# Patient Record
Sex: FEMALE | Race: WHITE | Employment: OTHER | URBAN - METROPOLITAN AREA
[De-identification: names, ages, dates, MRNs, and addresses within clinical notes are randomized per-mention and may not be internally consistent; named-entity substitution may affect disease eponyms.]

---

## 2017-01-06 ENCOUNTER — APPOINTMENT (OUTPATIENT)
Dept: CT IMAGING | Facility: CLINIC | Age: 67
DRG: 907 | End: 2017-01-06
Attending: INTERNAL MEDICINE
Payer: MEDICARE

## 2017-01-06 ENCOUNTER — ANESTHESIA (OUTPATIENT)
Dept: SURGERY | Facility: CLINIC | Age: 67
DRG: 907 | End: 2017-01-06
Payer: MEDICARE

## 2017-01-06 ENCOUNTER — ANESTHESIA EVENT (OUTPATIENT)
Dept: SURGERY | Facility: CLINIC | Age: 67
DRG: 907 | End: 2017-01-06
Payer: MEDICARE

## 2017-01-06 PROBLEM — K63.1 PERFORATED SIGMOID COLON (H): Status: ACTIVE | Noted: 2017-01-06

## 2017-01-06 PROCEDURE — 25000125 ZZHC RX 250: Performed by: ANESTHESIOLOGY

## 2017-01-06 PROCEDURE — 25000125 ZZHC RX 250: Performed by: NURSE ANESTHETIST, CERTIFIED REGISTERED

## 2017-01-06 PROCEDURE — 25800025 ZZH RX 258: Performed by: ANESTHESIOLOGY

## 2017-01-06 PROCEDURE — 74177 CT ABD & PELVIS W/CONTRAST: CPT

## 2017-01-06 RX ORDER — FENTANYL CITRATE 50 UG/ML
INJECTION, SOLUTION INTRAMUSCULAR; INTRAVENOUS PRN
Status: DISCONTINUED | OUTPATIENT
Start: 2017-01-06 | End: 2017-01-06

## 2017-01-06 RX ORDER — GLYCOPYRROLATE 0.2 MG/ML
INJECTION, SOLUTION INTRAMUSCULAR; INTRAVENOUS PRN
Status: DISCONTINUED | OUTPATIENT
Start: 2017-01-06 | End: 2017-01-06

## 2017-01-06 RX ORDER — PROPOFOL 10 MG/ML
INJECTION, EMULSION INTRAVENOUS PRN
Status: DISCONTINUED | OUTPATIENT
Start: 2017-01-06 | End: 2017-01-06

## 2017-01-06 RX ORDER — DEXAMETHASONE SODIUM PHOSPHATE 4 MG/ML
INJECTION, SOLUTION INTRA-ARTICULAR; INTRALESIONAL; INTRAMUSCULAR; INTRAVENOUS; SOFT TISSUE PRN
Status: DISCONTINUED | OUTPATIENT
Start: 2017-01-06 | End: 2017-01-06

## 2017-01-06 RX ORDER — NEOSTIGMINE METHYLSULFATE 1 MG/ML
VIAL (ML) INJECTION PRN
Status: DISCONTINUED | OUTPATIENT
Start: 2017-01-06 | End: 2017-01-06

## 2017-01-06 RX ORDER — PROPOFOL 10 MG/ML
INJECTION, EMULSION INTRAVENOUS CONTINUOUS PRN
Status: DISCONTINUED | OUTPATIENT
Start: 2017-01-06 | End: 2017-01-06

## 2017-01-06 RX ORDER — ONDANSETRON 2 MG/ML
INJECTION INTRAMUSCULAR; INTRAVENOUS PRN
Status: DISCONTINUED | OUTPATIENT
Start: 2017-01-06 | End: 2017-01-06

## 2017-01-06 RX ADMIN — SODIUM CHLORIDE, POTASSIUM CHLORIDE, SODIUM LACTATE AND CALCIUM CHLORIDE: 600; 310; 30; 20 INJECTION, SOLUTION INTRAVENOUS at 15:25

## 2017-01-06 RX ADMIN — GLYCOPYRROLATE 0.1 MG: 0.2 INJECTION, SOLUTION INTRAMUSCULAR; INTRAVENOUS at 13:25

## 2017-01-06 RX ADMIN — ROCURONIUM BROMIDE 10 MG: 10 INJECTION INTRAVENOUS at 16:01

## 2017-01-06 RX ADMIN — SODIUM CHLORIDE, POTASSIUM CHLORIDE, SODIUM LACTATE AND CALCIUM CHLORIDE: 600; 310; 30; 20 INJECTION, SOLUTION INTRAVENOUS at 13:20

## 2017-01-06 RX ADMIN — PHENYLEPHRINE HYDROCHLORIDE 100 MCG: 10 INJECTION, SOLUTION INTRAMUSCULAR; INTRAVENOUS; SUBCUTANEOUS at 16:38

## 2017-01-06 RX ADMIN — FENTANYL CITRATE 100 MCG: 50 INJECTION, SOLUTION INTRAMUSCULAR; INTRAVENOUS at 13:55

## 2017-01-06 RX ADMIN — MIDAZOLAM HYDROCHLORIDE 2 MG: 1 INJECTION, SOLUTION INTRAMUSCULAR; INTRAVENOUS at 13:20

## 2017-01-06 RX ADMIN — Medication 30 MG: at 13:25

## 2017-01-06 RX ADMIN — FENTANYL CITRATE 100 MCG: 50 INJECTION, SOLUTION INTRAMUSCULAR; INTRAVENOUS at 13:25

## 2017-01-06 RX ADMIN — ROCURONIUM BROMIDE 50 MG: 10 INJECTION INTRAVENOUS at 13:25

## 2017-01-06 RX ADMIN — PROPOFOL 50 MCG/KG/MIN: 10 INJECTION, EMULSION INTRAVENOUS at 13:38

## 2017-01-06 RX ADMIN — ONDANSETRON 4 MG: 2 INJECTION INTRAMUSCULAR; INTRAVENOUS at 16:00

## 2017-01-06 RX ADMIN — PHENYLEPHRINE HYDROCHLORIDE 100 MCG: 10 INJECTION, SOLUTION INTRAMUSCULAR; INTRAVENOUS; SUBCUTANEOUS at 13:34

## 2017-01-06 RX ADMIN — HYDROMORPHONE HYDROCHLORIDE 0.5 MG: 1 INJECTION, SOLUTION INTRAMUSCULAR; INTRAVENOUS; SUBCUTANEOUS at 16:02

## 2017-01-06 RX ADMIN — FENTANYL CITRATE 50 MCG: 50 INJECTION, SOLUTION INTRAMUSCULAR; INTRAVENOUS at 14:01

## 2017-01-06 RX ADMIN — SODIUM CHLORIDE, POTASSIUM CHLORIDE, SODIUM LACTATE AND CALCIUM CHLORIDE: 600; 310; 30; 20 INJECTION, SOLUTION INTRAVENOUS at 14:20

## 2017-01-06 RX ADMIN — GLYCOPYRROLATE 0.3 MG: 0.2 INJECTION, SOLUTION INTRAMUSCULAR; INTRAVENOUS at 16:30

## 2017-01-06 RX ADMIN — ROCURONIUM BROMIDE 15 MG: 10 INJECTION INTRAVENOUS at 15:21

## 2017-01-06 RX ADMIN — PROPOFOL 160 MG: 10 INJECTION, EMULSION INTRAVENOUS at 13:25

## 2017-01-06 RX ADMIN — HYDROMORPHONE HYDROCHLORIDE 0.5 MG: 1 INJECTION, SOLUTION INTRAMUSCULAR; INTRAVENOUS; SUBCUTANEOUS at 14:49

## 2017-01-06 RX ADMIN — HYDROMORPHONE HYDROCHLORIDE 0.5 MG: 1 INJECTION, SOLUTION INTRAMUSCULAR; INTRAVENOUS; SUBCUTANEOUS at 14:12

## 2017-01-06 RX ADMIN — ROCURONIUM BROMIDE 15 MG: 10 INJECTION INTRAVENOUS at 14:49

## 2017-01-06 RX ADMIN — DEXAMETHASONE SODIUM PHOSPHATE 4 MG: 4 INJECTION, SOLUTION INTRAMUSCULAR; INTRAVENOUS at 13:25

## 2017-01-06 RX ADMIN — Medication 3 MG: at 16:30

## 2017-01-06 NOTE — ANESTHESIA CARE TRANSFER NOTE
Patient: Aaliyah Hayes    LAPAROSCOPIC ASSISTED SIGMOID COLECTOMY (N/A Abdomen)  Additional InformationProcedure(s):  LAPAROSCOPIC ASSISTED SIGMOID COLECTOMY - Wound Class: II-Clean Contaminated    Diagnosis: perfed bowel  Diagnosis Additional Information: No value filed.    Anesthesia Type:   General, RSI, ETT     Note:  Airway :Face Mask  Patient transferred to:PACU  Comments: To PACU, adequate gas exchange, report to RN.      Vitals: (Last set prior to Anesthesia Care Transfer)              Electronically Signed By: PANKAJ Birmingham CRNA  January 6, 2017  4:55 PM

## 2017-01-06 NOTE — ANESTHESIA POSTPROCEDURE EVALUATION
Patient: Aaliyah Hayes    LAPAROSCOPIC ASSISTED SIGMOID COLECTOMY (N/A Abdomen)  Additional InformationProcedure(s):  LAPAROSCOPIC ASSISTED SIGMOID COLECTOMY - Wound Class: II-Clean Contaminated    Diagnosis:perfed bowel  Diagnosis Additional Information: Pre-operative diagnosis:  perfed bowel   Post-operative diagnosis  Perforated distal sigmoid colon   Procedure:  Procedure(s):  LAPAROSCOPIC ASSISTED SIGMOID COLECTOMY - Wound Class: II-Clean Contaminated             Anesthesia Type:  General, RSI, ETT    Note:  Anesthesia Post Evaluation    Patient location during evaluation: PACU  Patient participation: Able to fully participate in evaluation  Level of consciousness: awake  Pain management: adequate  Airway patency: patent  Cardiovascular status: acceptable  Respiratory status: acceptable  Hydration status: euvolemic  PONV: controlled     Anesthetic complications: None          Last vitals:  Filed Vitals:    01/06/17 1700 01/06/17 1705 01/06/17 1710   BP: 98/66 103/57 97/57   Pulse:      Temp:      Resp: 14 27 15   SpO2: 95% 95% 98%       Electronically Signed By: Felipe Santoro MD  January 6, 2017  5:15 PM

## 2017-01-06 NOTE — ANESTHESIA PREPROCEDURE EVALUATION
Anesthesia Evaluation     . Pt has had prior anesthetic. Type: General    No history of anesthetic complications     ROS/MED HX    ENT/Pulmonary:  - neg pulmonary ROS     Neurologic:  - neg neurologic ROS     Cardiovascular:     (+) hypertension----. : . . . :. .       METS/Exercise Tolerance:     Hematologic: Comments: Lab Test        11/11/16     02/05/16     11/24/15                       1039          1143          1110          WBC          10.3         7.0          14.0*         HGB          13.5         13.4         13.4          MCV          99           99           97            PLT          255          210          225            Lab Test        11/11/16 03/04/16 02/05/16                       1039          1324          1143          NA           136          138          136           POTASSIUM    4.2          4.1          3.5           CHLORIDE     99           101          100           CO2          27           30           26            BUN          21           15           18            CR           1.02         0.72         0.92          ANIONGAP     10           7            10            DARIUS          9.2          9.5          9.0           GLC          89           82           110*                    Musculoskeletal:  - neg musculoskeletal ROS       GI/Hepatic:     (+) Other GI/Hepatic ruptured sigmoid during endoscopy      Renal/Genitourinary:  - ROS Renal section negative       Endo:  - neg endo ROS       Psychiatric:  - neg psychiatric ROS       Infectious Disease:  - neg infectious disease ROS       Malignancy:         Other:    - neg other ROS           Physical Exam  Normal systems: cardiovascular, pulmonary and dental    Airway   Mallampati: II  TM distance: >3 FB  Neck ROM: full    Dental     Cardiovascular       Pulmonary                     Anesthesia Plan      History & Physical Review  History and physical reviewed and following examination; no interval change.    ASA  Status:  2 emergent.    NPO Status:  Waived due to emergency    Plan for General, RSI and ETT with Intravenous induction. Maintenance will be Balanced.    PONV prophylaxis:  Ondansetron (or other 5HT-3) and Dexamethasone or Solumedrol       Postoperative Care  Postoperative pain management:  IV analgesics and Oral pain medications.      Consents  Anesthetic plan, risks, benefits and alternatives discussed with:  Patient..                          .

## 2017-01-16 ENCOUNTER — TELEPHONE (OUTPATIENT)
Dept: PEDIATRICS | Facility: CLINIC | Age: 67
End: 2017-01-16

## 2017-01-16 NOTE — TELEPHONE ENCOUNTER
Patient was hospitalized for a perforated colon due to colonoscopy.  Patient is concerned she may have a bladder infection.  Has noted low-grade fever, and urinary discomfort.  Denies temp >101  Denies flank pain or abdominal pain.  Denies nausea or vomiting.  Advised that appointment tomorrow is fine.  If patient develops flank pain, fever >101, nausea or vomiting, will call back for an appointment today or will be seen in Urgent care.  No further questions.  Will call back if any other questions or concerns..  ZACHARY Alvares RN

## 2017-01-17 ENCOUNTER — OFFICE VISIT (OUTPATIENT)
Dept: PEDIATRICS | Facility: CLINIC | Age: 67
End: 2017-01-17
Payer: MEDICARE

## 2017-01-17 VITALS
HEIGHT: 62 IN | SYSTOLIC BLOOD PRESSURE: 98 MMHG | TEMPERATURE: 98.8 F | DIASTOLIC BLOOD PRESSURE: 52 MMHG | HEART RATE: 66 BPM | BODY MASS INDEX: 25.07 KG/M2 | OXYGEN SATURATION: 99 % | WEIGHT: 136.25 LBS

## 2017-01-17 DIAGNOSIS — M25.552 HIP PAIN, LEFT: ICD-10-CM

## 2017-01-17 DIAGNOSIS — R31.9 HEMATURIA: ICD-10-CM

## 2017-01-17 DIAGNOSIS — K63.1 PERFORATED SIGMOID COLON (H): ICD-10-CM

## 2017-01-17 DIAGNOSIS — N30.01 ACUTE CYSTITIS WITH HEMATURIA: Primary | ICD-10-CM

## 2017-01-17 DIAGNOSIS — R30.0 DYSURIA: ICD-10-CM

## 2017-01-17 LAB
ALBUMIN UR-MCNC: NEGATIVE MG/DL
APPEARANCE UR: ABNORMAL
BACTERIA #/AREA URNS HPF: ABNORMAL /HPF
BILIRUB UR QL STRIP: NEGATIVE
COLOR UR AUTO: YELLOW
GLUCOSE UR STRIP-MCNC: NEGATIVE MG/DL
HGB UR QL STRIP: ABNORMAL
KETONES UR STRIP-MCNC: NEGATIVE MG/DL
LEUKOCYTE ESTERASE UR QL STRIP: ABNORMAL
NITRATE UR QL: NEGATIVE
NON-SQ EPI CELLS #/AREA URNS LPF: ABNORMAL /LPF
PH UR STRIP: 5.5 PH (ref 5–7)
RBC #/AREA URNS AUTO: ABNORMAL /HPF (ref 0–2)
SP GR UR STRIP: 1.01 (ref 1–1.03)
URN SPEC COLLECT METH UR: ABNORMAL
UROBILINOGEN UR STRIP-ACNC: 0.2 EU/DL (ref 0.2–1)
WBC #/AREA URNS AUTO: ABNORMAL /HPF (ref 0–2)

## 2017-01-17 PROCEDURE — 81001 URINALYSIS AUTO W/SCOPE: CPT | Performed by: INTERNAL MEDICINE

## 2017-01-17 PROCEDURE — 87086 URINE CULTURE/COLONY COUNT: CPT | Performed by: INTERNAL MEDICINE

## 2017-01-17 PROCEDURE — 99214 OFFICE O/P EST MOD 30 MIN: CPT | Performed by: INTERNAL MEDICINE

## 2017-01-17 RX ORDER — CIPROFLOXACIN 500 MG/1
500 TABLET, FILM COATED ORAL 2 TIMES DAILY
Qty: 14 TABLET | Refills: 0 | Status: SHIPPED | OUTPATIENT
Start: 2017-01-17 | End: 2017-06-09

## 2017-01-17 RX ORDER — TRAMADOL HYDROCHLORIDE 50 MG/1
50 TABLET ORAL EVERY 8 HOURS PRN
Qty: 28 TABLET | Refills: 5 | Status: SHIPPED | OUTPATIENT
Start: 2017-01-17 | End: 2017-06-09

## 2017-01-17 NOTE — MR AVS SNAPSHOT
After Visit Summary   1/17/2017    Aaliyah Hayes    MRN: [id number]           Patient Information     Date Of Birth          1950        Visit Information        Provider Department      1/17/2017 10:20 AM Jessie High MD [facility] [place]        Today's Diagnoses     Acute cystitis with hematuria    -  1     Dysuria         Perforated sigmoid colon (H)         Hip pain, left         Hematuria            Follow-ups after your visit        Future tests that were ordered for you today     Open Future Orders        Priority Expected Expires Ordered    *UA reflex to Microscopic and Culture ([facility], [place] and [facility] (except [place] and [place]) Routine  2/17/2017 1/17/2017            Who to contact     If you have questions or need follow up information about today's clinic visit or your schedule please contact [facility] directly at [phone].  Normal or non-critical lab and imaging results will be communicated to you by [employer], letter or phone within 4 business days after the clinic has received the results. If you do not hear from us within 7 days, please contact the clinic through [employer] or phone. If you have a critical or abnormal lab result, we will notify you by phone as soon as possible.  Submit refill requests through [employer] or call your pharmacy and they will forward the refill request to us. Please allow 3 business days for your refill to be completed.          Additional Information About Your Visit        MyChart Information     [employer] gives you secure access to your electronic health record. If you see a primary care provider, you can also send messages to your care team and make appointments. If you have questions, please call your primary care clinic.  If you do not have a primary care provider, please call [phone] and they will assist you.        Care EveryWhere ID     This is your Care EveryWhere ID. This could be used by  "other organizations to access your [facility] medical records  [id number]        Your Vitals Were     Pulse Temperature Height BMI (Body Mass Index) Pulse Oximetry       66 98.8  F (37.1  C) (Tympanic) 5' 2\" (1.575 m) 24.91 kg/m2 99%        Blood Pressure from Last 3 Encounters:   01/17/17 98/52   01/09/17 120/68   11/11/16 128/80    Weight from Last 3 Encounters:   01/17/17 136 lb 4 oz (61.803 kg)   01/06/17 134 lb (60.782 kg)   11/11/16 138 lb 8 oz (62.823 kg)              We Performed the Following     *UA reflex to Microscopic and Culture ([facility] (except [place] and [place])     Urine Culture Aerobic Bacterial     Urine Microscopic          Today's Medication Changes          These changes are accurate as of: 1/17/17 10:57 AM.  If you have any questions, ask your nurse or doctor.               Start taking these medicines.        Dose/Directions    ciprofloxacin 500 MG tablet   Commonly known as:  CIPRO   Used for:  Acute cystitis with hematuria   Started by:  Jessie High MD        Dose:  500 mg   Take 1 tablet (500 mg) by mouth 2 times daily   Quantity:  14 tablet   Refills:  0            Where to get your medicines      These medications were sent to [facility] [address]  [address] [address]     Phone:  [phone]    - ciprofloxacin 500 MG tablet      Some of these will need a paper prescription and others can be bought over the counter.  Ask your nurse if you have questions.     Bring a paper prescription for each of these medications    - traMADol 50 MG tablet             Primary Care Provider Office Phone # Fax #    Jessie High -[phone]       [facility] [address] [place] [address]        Thank you!     Thank you for choosing [facility]  for your care. Our goal is always to provide you with excellent care. Hearing back from our patients is " one way we can continue to improve our services. Please take a few minutes to complete the written survey that you may receive in the mail after your visit with us. Thank you!             Your Updated Medication List - Protect others around you: Learn how to safely use, store and throw away your medicines at www.disposemymeds.org.          This list is accurate as of: 1/17/17 10:57 AM.  Always use your most recent med list.                   Brand Name Dispense Instructions for use    aspirin 81 MG tablet      Take 81 mg by mouth daily       atenolol-chlorthalidone 100-25 MG per tablet    TENORETIC 100    90 tablet    Take 1 tablet by mouth daily       ciprofloxacin 500 MG tablet    CIPRO    14 tablet    Take 1 tablet (500 mg) by mouth 2 times daily       ibuprofen 200 MG tablet    ADVIL/MOTRIN    120 tablet    Take 3 tablets (600 mg) by mouth every 6 hours as needed for pain       lisinopril 10 MG tablet    PRINIVIL/ZESTRIL    90 tablet    Take 1 tablet (10 mg) by mouth daily       magnesium 250 MG tablet     30 tablet    Take 2 tablets by mouth At Bedtime       Multi-vitamin Tabs tablet     100 tablet    Take 1 tablet by mouth every morning       Potassium Gluconate 2 MEQ Tabs     120 tablet    Take 2 tablets by mouth every morning       simvastatin 20 MG tablet    ZOCOR     Take 20 mg by mouth At Bedtime       traMADol 50 MG tablet    ULTRAM    28 tablet    Take 1 tablet (50 mg) by mouth every 8 hours as needed       TYLENOL 500 MG tablet   Generic drug:  acetaminophen      Take 1-2 tablets (500-1,000 mg) by mouth every 6 hours as needed for mild pain       vitamin B complex with vitamin C Tabs tablet      Take 1 tablet by mouth daily

## 2017-01-17 NOTE — NURSING NOTE
"Chief Complaint   Patient presents with     UTI     Surgical Followup       Initial BP 98/52 mmHg  Pulse 66  Temp(Src) 98.8  F (37.1  C) (Tympanic)  Ht 5' 2\" (1.575 m)  Wt 136 lb 4 oz (61.803 kg)  BMI 24.91 kg/m2  SpO2 99% Estimated body mass index is 24.91 kg/(m^2) as calculated from the following:    Height as of this encounter: 5' 2\" (1.575 m).    Weight as of this encounter: 136 lb 4 oz (61.803 kg).  BP completed using cuff size: regular  Francinedamien Kirk CMA      "

## 2017-01-17 NOTE — PROGRESS NOTES
"  SUBJECTIVE:                                                    Aaliyah Hayes is a 66 year old female who presents to clinic today for the following health issues:      URINARY TRACT SYMPTOMS      Duration: after surgery pt feels like she is still having infection feeling    Description  dysuria, frequency and dark stools    Intensity:  mild, moderate    Accompanying signs and symptoms:  Fever/chills: YES  Flank pain YES  Nausea and vomiting: YES, nausea  Vaginal symptoms: none  Abdominal/Pelvic Pain: YES    History  History of frequent UTI's: no   History of kidney stones: no   Sexually Active: no   Possibility of pregnancy: No    Precipitating or alleviating factors: None    Therapies tried and outcome: ibuprofen and Tylenol   Outcome: some relief     Notes fatigue, body/muscle aches.  She feels that she is not getting better like she thinks she should.  Notes that abdomen is \"tumbly\".  No fevers.  BM's are normal, no blood in stools.  Had to cancel hip replacement due to bowel perforation and sigmoid resection.  Wondering about getting a script for more tramadol.      Has some burning with urination and after urination.  Having hip pain, and maybe some low back pain.        Problem list and histories reviewed & adjusted, as indicated.  Additional history: as documented    Problem list, Medication list, Allergies, and Medical/Social/Surgical histories reviewed in [facility] and updated as appropriate.    ROS:  Constitutional, HEENT, cardiovascular, pulmonary, gi and gu systems are negative, except as otherwise noted.    OBJECTIVE:                                                    BP 98/52 mmHg  Pulse 66  Temp(Src) 98.8  F (37.1  C) (Tympanic)  Ht 5' 2\" (1.575 m)  Wt 136 lb 4 oz (61.803 kg)  BMI 24.91 kg/m2  SpO2 99%  Body mass index is 24.91 kg/(m^2).  GENERAL: healthy, alert and no distress  RESP: lungs clear to auscultation - no rales, rhonchi or wheezes  CV: regular rate and rhythm, normal S1 S2, no S3 " or S4, no murmur, click or rub, no peripheral edema and peripheral pulses strong  ABDOMEN: soft, no hepatosplenomegaly, no masses and bowel sounds normal.  Incisions c/d/i, no erythema.  Expected tenderness around incisions.   MS: no gross musculoskeletal defects noted, no edema  Back:  No CVA tenderness    Diagnostic Test Results:  Results for orders placed or performed in visit on 01/17/17 (from the past 24 hour(s))   *UA reflex to Microscopic and Culture ([facility] (except [place] and [place])   Result Value Ref Range    Color Urine Yellow     Appearance Urine Slightly Cloudy     Glucose Urine Negative NEG mg/dL    Bilirubin Urine Negative NEG    Ketones Urine Negative NEG mg/dL    Specific Gravity Urine 1.015 1.003 - 1.035    Blood Urine Large (A) NEG    pH Urine 5.5 5.0 - 7.0 pH    Protein Albumin Urine Negative NEG mg/dL    Urobilinogen Urine 0.2 0.2 - 1.0 EU/dL    Nitrite Urine Negative NEG    Leukocyte Esterase Urine Large (A) NEG    Source Midstream Urine    Urine Microscopic   Result Value Ref Range    WBC Urine 10-25 (A) 0 - 2 /HPF    RBC Urine  (A) 0 - 2 /HPF    Squamous Epithelial /LPF Urine Few FEW /LPF    Bacteria Urine Few (A) NEG /HPF        ASSESSMENT/PLAN:                                                    (N30.01) Acute cystitis with hematuria  (primary encounter diagnosis)  -will treat for 7 days due to having a catheter while hospitalized  -call if fevers, back pain, no improvement in 48 hrs  -recheck urine in 2 weeks to be sure hematuria has resolved  Plan: ciprofloxacin (CIPRO) 500 MG tablet, *UA reflex        to Microscopic and Culture ([facility] (except [place] and         [place])         (K63.1) Perforated sigmoid colon (H)  -doing well post op  -mobility is good, normal bm's  -call if blood in stools, black stools or any new symptoms    (M25.552) Hip pain, left  -ok to use tramadol for pain   Plan: traMADol  (ULTRAM) 50 MG tablet          (R31.9) Hematuria  -recheck urine in 2 weeks to be sure hematuria has resolved    (R30.0) Dysuria  Plan: *UA reflex [facility] Microscopic and Culture         [facility]except         [place] and [place]), Urine Microscopic,         Urine Culture Aerobic Bacterial         FUTURE APPOINTMENTS:       - Follow-up visit in 2 weeks for ua only        - Follow-up for annual visit or as needed    Jessie High MD  [facility]

## 2017-01-18 ENCOUNTER — TRANSFERRED RECORDS (OUTPATIENT)
Dept: HEALTH INFORMATION MANAGEMENT | Facility: CLINIC | Age: 67
End: 2017-01-18

## 2017-01-18 LAB
BACTERIA SPEC CULT: NORMAL
MICRO REPORT STATUS: NORMAL
SPECIMEN SOURCE: NORMAL

## 2017-01-20 ENCOUNTER — TELEPHONE (OUTPATIENT)
Dept: PEDIATRICS | Facility: CLINIC | Age: 67
End: 2017-01-20

## 2017-01-20 NOTE — TELEPHONE ENCOUNTER
Pt calls and leaves a message on the triage line.  She was given cipro.  She thinks she may be having a reaction and is requesting a call back.  Called the pt back.      She has facial swelling, tingling on her whole upper lip.  Her throat is slightly closed.  She had a little bit of a racing heart.  She said it is not life threatening.  She has body and muscle aches.  The last dose was last night at 8 p.m.  She generally does not feel good with it.  She did take some benadryl, 1 yesterday afternoon and 1 this morning about 2:30.  She said it did not seem to help.      Advised not to take cipro again.      Her face is still swollen, her throat still feels a little closed, mucous congestion, her nasal passages are a little swollen.      She is not really having any bladder symptoms.  Within 2 pills she felt much better with the bladder.      She is going to go to her [facility] house.      Pt can be reached at [phone].    Spoke to Dr. High about above.  She advised to stop the cipro.  Put the cipro on allergy list.  If worse, go to the ER.  She can take benadryl per directions also.  Called the pt to advise.

## 2017-01-23 ENCOUNTER — TRANSFERRED RECORDS (OUTPATIENT)
Dept: HEALTH INFORMATION MANAGEMENT | Facility: CLINIC | Age: 67
End: 2017-01-23

## 2017-02-10 DIAGNOSIS — N30.01 ACUTE CYSTITIS WITH HEMATURIA: ICD-10-CM

## 2017-02-10 LAB
ALBUMIN UR-MCNC: ABNORMAL MG/DL
APPEARANCE UR: CLEAR
BILIRUB UR QL STRIP: NEGATIVE
COLOR UR AUTO: YELLOW
GLUCOSE UR STRIP-MCNC: NEGATIVE MG/DL
HGB UR QL STRIP: NEGATIVE
KETONES UR STRIP-MCNC: NEGATIVE MG/DL
LEUKOCYTE ESTERASE UR QL STRIP: NEGATIVE
NITRATE UR QL: NEGATIVE
PH UR STRIP: 6.5 PH (ref 5–7)
RBC #/AREA URNS AUTO: NORMAL /HPF (ref 0–2)
SP GR UR STRIP: 1.02 (ref 1–1.03)
URN SPEC COLLECT METH UR: ABNORMAL
UROBILINOGEN UR STRIP-ACNC: 0.2 EU/DL (ref 0.2–1)
WBC #/AREA URNS AUTO: NORMAL /HPF (ref 0–2)

## 2017-02-10 PROCEDURE — 81001 URINALYSIS AUTO W/SCOPE: CPT | Performed by: INTERNAL MEDICINE

## 2017-03-02 DIAGNOSIS — I10 BENIGN ESSENTIAL HYPERTENSION: ICD-10-CM

## 2017-03-02 RX ORDER — LISINOPRIL 10 MG/1
10 TABLET ORAL DAILY
Qty: 90 TABLET | Refills: 3 | Status: SHIPPED | OUTPATIENT
Start: 2017-03-02 | End: 2017-09-25

## 2017-03-02 NOTE — TELEPHONE ENCOUNTER
LISINOPRIL 10MG      Last Written Prescription Date: 3/4/2016  Last Fill Quantity: 90, # refills: 3  Last Office Visit with G, [facility] or [facility] prescribing provider: 1/17/2017       Potassium   Date Value Ref Range Status   01/07/2017 3.4 3.4 - 5.3 mmol/L Final     Creatinine   Date Value Ref Range Status   01/07/2017 0.72 0.52 - 1.04 mg/dL Final     BP Readings from Last 3 Encounters:   01/17/17 98/52   01/09/17 120/68   11/11/16 128/80

## 2017-06-06 ASSESSMENT — PATIENT HEALTH QUESTIONNAIRE - PHQ9
SUM OF ALL RESPONSES TO PHQ QUESTIONS 1-9: 12
SUM OF ALL RESPONSES TO PHQ QUESTIONS 1-9: 12
10. IF YOU CHECKED OFF ANY PROBLEMS, HOW DIFFICULT HAVE THESE PROBLEMS MADE IT FOR YOU TO DO YOUR WORK, TAKE CARE OF THINGS AT HOME, OR GET ALONG WITH OTHER PEOPLE: SOMEWHAT DIFFICULT

## 2017-06-07 ASSESSMENT — PATIENT HEALTH QUESTIONNAIRE - PHQ9: SUM OF ALL RESPONSES TO PHQ QUESTIONS 1-9: 12

## 2017-06-07 NOTE — PROGRESS NOTES
SUBJECTIVE:                                                            Aaliyah Hayes is a 67 year old female who presents for Preventive Visit.  Are you in the first 12 months of your Medicare coverage?  Yes,  Visual Acuity:  Right Eye: 10/12   Left Eye: 10/12  Both Eyes: 10/12    Physical   Annual:     Getting at least 3 servings of Calcium per day::  Yes    Bi-annual eye exam::  NO    Dental care twice a year::  Yes    Sleep apnea or symptoms of sleep apnea::  Daytime drowsiness    Diet::  Regular (no restrictions) and Other    Frequency of exercise::  None    Taking medications regularly::  Yes    Additional concerns today::  YES (discuss chronic pain management, sleep apnea, need referral forx ray guided cortisone injection)      1) was referred in march for steroid injection, would like to do another.    2) worried about ibuprofen, wondering if celebrex would be helpful.  Is taking tramadol at night for pain.  Having hip replacement in December.      3) she wonders if she has sleep apnea.  Is drowsy during the day.  Has had some nightmares.  She does snore.  Goes to bed at 9:30 pm- 5 am.  Doesn't sleep well due to pain.  Does not wake feeling rested.  Sister has sleep apnea.  No hypersomnolence during the day.    4) has herpetic ritesh on finger that comes and goes a few times a year.  Uses abbreva      COGNITIVE SCREEN  1) Repeat 3 items (Banana, Sunrise, Chair)    2) Clock draw: NORMAL  3) 3 item recall: Recalls 3 objects  Results: 3 items recalled: COGNITIVE IMPAIRMENT LESS LIKELY    Mini-CogTM Copyright SHARLA Lizarraga. Licensed by the author for use in [facility]; reprinted with permission (buck@.[facility]). All rights reserved.        Reviewed and updated as needed this visit by clinical staff  Tobacco  Allergies  Meds  Med Hx  Surg Hx  Fam Hx  Soc Hx        Reviewed and updated as needed this visit by Provider        Social History   Substance Use Topics     Smoking status: Never Smoker      Smokeless tobacco: Never Used     Alcohol use 0.0 oz/week     0 Standard drinks or equivalent per week      Comment: 1 glass of wine per night       The patient does not drink >3 drinks per day nor >7 drinks per week.        Hyperlipidemia Follow-Up      Rate your low fat/cholesterol diet?: good    Taking statin?  Yes, no muscle aches from statin    Other lipid medications/supplements?:  none     Hypertension Follow-up      Outpatient blood pressures are being checked at home.  Results are 120/70s.    Low Salt Diet: no added salt       Today's PHQ-2 Score:   PHQ-2 ( 1999 Pfizer) 6/6/2017   Q1: Little interest or pleasure in doing things Nearly every day   Q2: Feeling down, depressed or hopeless Not at all   PHQ-2 Score 3       Do you feel safe in your environment - YES    Do you have a Health Care Directive?: No: Advance care planning was reviewed with patient; patient declined at this time.    Current providers sharing in care for this patient include:   Patient Care Team:  Jessie High MD as PCP - General (Pediatrics)      Hearing impairment: Yes, Feel that people are mumbling or not speaking clearly.    Need to ask people to speak up or repeat themselves.    Ability to successfully perform activities of daily living: Yes, no assistance needed     Fall risk:       Home safety:  none identified      The following health maintenance items are reviewed in Epic and correct as of today:  Health Maintenance   Topic Date Due     HEPATITIS C SCREENING  01/28/1970     DEXA Q3 YR  01/28/1982     FALL RISK ASSESSMENT  01/28/2017     PNEUMOCOCCAL (1 of 2 - PCV13) 01/28/2017     PAP Q3 YR  02/14/2017     LIPID MONITORING Q1 YEAR  03/04/2017     INFLUENZA VACCINE (SYSTEM ASSIGNED)  09/01/2017     CMP Q1 YR  11/11/2017     KRISTAN QUESTIONNAIRE 1 YEAR  11/11/2017     MAMMO SCREEN Q2 YR (SYSTEM ASSIGNED)  03/04/2018     PHQ-9 Q1YR  06/09/2018     ADVANCE DIRECTIVE PLANNING Q5 YRS  02/14/2019     TETANUS IMMUNIZATION  "(SYSTEM ASSIGNED)  06/14/2023     COLON CANCER SCREEN (SYSTEM ASSIGNED)  01/06/2027         Pneumonia Vaccine:Adults age 65+ who have not received previous Pneumovax (PPSV23) or PCV13 as an adult: Should first be given PCV13 AND then should be given PPSV23 6-12 months after PCV13  Mammogram Screening: Patient over age 50, mutual decision to screen reflected in health maintenance.     ROS:  Constitutional, HEENT, cardiovascular, pulmonary, gi and gu systems are negative, except as otherwise noted.    Problem list, Medication list, Allergies, and Medical/Social/Surgical histories reviewed in [facility] and updated as appropriate.  OBJECTIVE:                                                            /62 (BP Location: Right arm, Patient Position: Chair, Cuff Size: Adult Regular)  Pulse 69  Temp 98.8  F (37.1  C) (Tympanic)  Ht 5' 2\" (1.575 m)  Wt 132 lb 2 oz (59.9 kg)  SpO2 99%  BMI 24.17 kg/m2 Estimated body mass index is 24.17 kg/(m^2) as calculated from the following:    Height as of this encounter: 5' 2\" (1.575 m).    Weight as of this encounter: 132 lb 2 oz (59.9 kg).  EXAM:   GENERAL: healthy, alert and no distress  EYES: Eyes grossly normal to inspection, PERRL and conjunctivae and sclerae normal  HENT: ear canals and TM's normal, nose and mouth without ulcers or lesions  NECK: no adenopathy, no asymmetry, masses, or scars and thyroid normal to palpation  RESP: lungs clear to auscultation - no rales, rhonchi or wheezes  BREAST: normal without masses, tenderness or nipple discharge and no palpable axillary masses or adenopathy  CV: regular rate and rhythm, normal S1 S2, no S3 or S4, no murmur, click or rub, no peripheral edema and peripheral pulses strong  ABDOMEN: soft, nontender, no hepatosplenomegaly, no masses and bowel sounds normal   (female): normal female external genitalia, normal urethral meatus, vaginal mucosa pink, moist, well rugated, and normal cervix/adnexa/uterus without masses or " discharge  MS: no gross musculoskeletal defects noted, no edema  SKIN: no suspicious lesions or rashes  NEURO: Normal strength and tone, mentation intact and speech normal  PSYCH: mentation appears normal, affect normal/bright    ASSESSMENT / PLAN:                                                            (Z01.419) Encounter for gynecological examination without abnormal finding  (primary encounter diagnosis)  -pap today  -due for dexa  -prevnar today, otherwise utd  -colon utd  Plan: Pap imaged thin layer screen with HPV -         recommended age 30 - 65 years (select HPV order        below), HPV High Risk Types DNA Cervical           (I10) Benign essential hypertension  -bp at goal on meds  -due for labs   Plan: atenolol-chlorthalidone (TENORETIC 100) 100-25         MG per tablet, Comprehensive metabolic panel            (E78.5) Hyperlipidemia with target LDL less than 130  -due for labs, tolerating meds  Plan: Lipid Profile with reflex to direct LDL,         Comprehensive metabolic panel         (K21.9) Gastroesophageal reflux disease without esophagitis  Plan: omeprazole (PRILOSEC) 20 MG CR capsule            (M25.552) Hip pain, left  -will try switching from ibuprofen to celebrex to decrease the number of pills she needs to take  -also developing more reflux, GI started her on prilosec, celebrex likely better choice than ibuprofen   Plan: traMADol (ULTRAM) 50 MG tablet, celecoxib         (CELEBREX) 200 MG capsule          (B00.89) Herpetic ritesh  -discussed topical treatment vs pills  -pt would like to try pills, script sent, reviewed how to take meds  Plan: valACYclovir (VALTREX) 1000 mg tablet         (Z11.59) Need for hepatitis C screening test  Plan: **Hepatitis C Screen Reflex to RNA FUTURE         anytime          (Z13.820) Screening for osteoporosis    (Z23) Need for pneumococcal vaccination  Plan: Pneumococcal vaccine 13 valent PCV13 IM         (Prevnar) [60636], ADMIN: Vaccine, Initial          "(88927)           (Z78.0) Asymptomatic menopausal state   Plan: DX Hip/Pelvis/Spine        End of Life Planning:  Patient currently has an advanced directive: No.  I have verified the patient's ablity to prepare an advanced directive/make health care decisions.  Literature was provided to assist patient in preparing an advanced directive.    COUNSELING:  Reviewed preventive health counseling, as reflected in patient instructions       Regular exercise       Healthy diet/nutrition       Osteoporosis Prevention/Bone Health        Estimated body mass index is 24.17 kg/(m^2) as calculated from the following:    Height as of this encounter: 5' 2\" (1.575 m).    Weight as of this encounter: 132 lb 2 oz (59.9 kg).     reports that she has never smoked. She has never used smokeless tobacco.      Appropriate preventive services were discussed with this patient, including applicable screening as appropriate for cardiovascular disease, diabetes, osteopenia/osteoporosis, and glaucoma.  As appropriate for age/gender, discussed screening for colorectal cancer, prostate cancer, breast cancer, and cervical cancer. Checklist reviewing preventive services available has been given to the patient.    Reviewed patients plan of care and provided an AVS. The Intermediate Care Plan ( asthma action plan, low back pain action plan, and migraine action plan) for Aaliyah meets the Care Plan requirement. This Care Plan has been established and reviewed with the Patient.    Counseling Resources:  ATP IV Guidelines  Pooled Cohorts Equation Calculator  Breast Cancer Risk Calculator  FRAX Risk Assessment  ICSI Preventive Guidelines  Dietary Guidelines for Americans, 2010  USDA's MyPlate  ASA Prophylaxis  Lung CA Screening    Jessie High MD  [facility] [place]  Answers for HPI/ROS submitted by the patient on 6/6/2017   PHQ-2 Score: 3  If you checked off any problems, how difficult have these problems made it for you to do your work, " take care of things at home, or get along with other people?: Somewhat difficult  PHQ9 TOTAL SCORE: 12

## 2017-06-09 ENCOUNTER — OFFICE VISIT (OUTPATIENT)
Dept: PEDIATRICS | Facility: CLINIC | Age: 67
End: 2017-06-09
Payer: MEDICARE

## 2017-06-09 VITALS
HEART RATE: 69 BPM | BODY MASS INDEX: 24.31 KG/M2 | OXYGEN SATURATION: 99 % | DIASTOLIC BLOOD PRESSURE: 62 MMHG | SYSTOLIC BLOOD PRESSURE: 122 MMHG | HEIGHT: 62 IN | WEIGHT: 132.13 LBS | TEMPERATURE: 98.8 F

## 2017-06-09 DIAGNOSIS — B00.89 HERPETIC WHITLOW: ICD-10-CM

## 2017-06-09 DIAGNOSIS — E78.5 HYPERLIPIDEMIA WITH TARGET LDL LESS THAN 130: ICD-10-CM

## 2017-06-09 DIAGNOSIS — R53.83 OTHER FATIGUE: ICD-10-CM

## 2017-06-09 DIAGNOSIS — I10 BENIGN ESSENTIAL HYPERTENSION: ICD-10-CM

## 2017-06-09 DIAGNOSIS — Z13.820 SCREENING FOR OSTEOPOROSIS: ICD-10-CM

## 2017-06-09 DIAGNOSIS — Z23 NEED FOR PNEUMOCOCCAL VACCINATION: ICD-10-CM

## 2017-06-09 DIAGNOSIS — Z11.59 NEED FOR HEPATITIS C SCREENING TEST: ICD-10-CM

## 2017-06-09 DIAGNOSIS — Z78.0 ASYMPTOMATIC MENOPAUSAL STATE: ICD-10-CM

## 2017-06-09 DIAGNOSIS — Z01.419 ENCOUNTER FOR GYNECOLOGICAL EXAMINATION WITHOUT ABNORMAL FINDING: Primary | ICD-10-CM

## 2017-06-09 DIAGNOSIS — M25.552 HIP PAIN, LEFT: ICD-10-CM

## 2017-06-09 DIAGNOSIS — K21.9 GASTROESOPHAGEAL REFLUX DISEASE WITHOUT ESOPHAGITIS: ICD-10-CM

## 2017-06-09 LAB
ALBUMIN SERPL-MCNC: 4.2 G/DL (ref 3.4–5)
ALP SERPL-CCNC: 71 U/L (ref 40–150)
ALT SERPL W P-5'-P-CCNC: 17 U/L (ref 0–50)
ANION GAP SERPL CALCULATED.3IONS-SCNC: 8 MMOL/L (ref 3–14)
AST SERPL W P-5'-P-CCNC: 20 U/L (ref 0–45)
BILIRUB SERPL-MCNC: 0.5 MG/DL (ref 0.2–1.3)
BUN SERPL-MCNC: 22 MG/DL (ref 7–30)
CALCIUM SERPL-MCNC: 9.6 MG/DL (ref 8.5–10.1)
CHLORIDE SERPL-SCNC: 97 MMOL/L (ref 94–109)
CHOLEST SERPL-MCNC: 196 MG/DL
CO2 SERPL-SCNC: 28 MMOL/L (ref 20–32)
CREAT SERPL-MCNC: 0.82 MG/DL (ref 0.52–1.04)
GFR SERPL CREATININE-BSD FRML MDRD: 70 ML/MIN/1.7M2
GLUCOSE SERPL-MCNC: 94 MG/DL (ref 70–99)
HCV AB SERPL QL IA: NORMAL
HDLC SERPL-MCNC: 59 MG/DL
LDLC SERPL CALC-MCNC: 85 MG/DL
NONHDLC SERPL-MCNC: 137 MG/DL
POTASSIUM SERPL-SCNC: 4.6 MMOL/L (ref 3.4–5.3)
PROT SERPL-MCNC: 7.4 G/DL (ref 6.8–8.8)
SODIUM SERPL-SCNC: 133 MMOL/L (ref 133–144)
TRIGL SERPL-MCNC: 260 MG/DL

## 2017-06-09 PROCEDURE — 80061 LIPID PANEL: CPT | Performed by: INTERNAL MEDICINE

## 2017-06-09 PROCEDURE — 90670 PCV13 VACCINE IM: CPT | Performed by: INTERNAL MEDICINE

## 2017-06-09 PROCEDURE — G0009 ADMIN PNEUMOCOCCAL VACCINE: HCPCS | Performed by: INTERNAL MEDICINE

## 2017-06-09 PROCEDURE — G0472 HEP C SCREEN HIGH RISK/OTHER: HCPCS | Performed by: INTERNAL MEDICINE

## 2017-06-09 PROCEDURE — 36415 COLL VENOUS BLD VENIPUNCTURE: CPT | Performed by: INTERNAL MEDICINE

## 2017-06-09 PROCEDURE — 86803 HEPATITIS C AB TEST: CPT | Performed by: INTERNAL MEDICINE

## 2017-06-09 PROCEDURE — 87624 HPV HI-RISK TYP POOLED RSLT: CPT | Performed by: INTERNAL MEDICINE

## 2017-06-09 PROCEDURE — G0145 SCR C/V CYTO,THINLAYER,RESCR: HCPCS | Performed by: INTERNAL MEDICINE

## 2017-06-09 PROCEDURE — G0476 HPV COMBO ASSAY CA SCREEN: HCPCS | Performed by: INTERNAL MEDICINE

## 2017-06-09 PROCEDURE — G0402 INITIAL PREVENTIVE EXAM: HCPCS | Performed by: INTERNAL MEDICINE

## 2017-06-09 PROCEDURE — 99213 OFFICE O/P EST LOW 20 MIN: CPT | Mod: 25 | Performed by: INTERNAL MEDICINE

## 2017-06-09 PROCEDURE — 80053 COMPREHEN METABOLIC PANEL: CPT | Performed by: INTERNAL MEDICINE

## 2017-06-09 RX ORDER — CHOLECALCIFEROL (VITAMIN D3) 125 MCG
CAPSULE ORAL DAILY
COMMUNITY
End: 2017-09-25

## 2017-06-09 RX ORDER — TRAMADOL HYDROCHLORIDE 50 MG/1
50 TABLET ORAL EVERY 8 HOURS PRN
Qty: 28 TABLET | Refills: 5 | Status: SHIPPED | OUTPATIENT
Start: 2017-06-09 | End: 2017-08-31

## 2017-06-09 RX ORDER — ATENOLOL AND CHLORTHALIDONE TABLET 100; 25 MG/1; MG/1
1 TABLET ORAL DAILY
Qty: 90 TABLET | Refills: 3 | Status: SHIPPED | OUTPATIENT
Start: 2017-06-09 | End: 2017-09-25

## 2017-06-09 RX ORDER — VALACYCLOVIR HYDROCHLORIDE 1 G/1
2000 TABLET, FILM COATED ORAL 2 TIMES DAILY
Qty: 4 TABLET | Refills: 11 | Status: SHIPPED | OUTPATIENT
Start: 2017-06-09 | End: 2017-09-25

## 2017-06-09 RX ORDER — CELECOXIB 200 MG/1
200 CAPSULE ORAL 2 TIMES DAILY PRN
Qty: 60 CAPSULE | Refills: 1 | Status: SHIPPED | OUTPATIENT
Start: 2017-06-09 | End: 2017-07-06

## 2017-06-09 NOTE — MR AVS SNAPSHOT
After Visit Summary   6/9/2017    Aaliyah Hayes    MRN: [id number]           Patient Information     Date Of Birth          1950        Visit Information        Provider Department      6/9/2017 8:20 AM Jessie High MD [facility]        Today's Diagnoses     Encounter for gynecological examination without abnormal finding    -  1    Benign essential hypertension        Hyperlipidemia with target LDL less than 130        Gastroesophageal reflux disease without esophagitis        Hip pain, left        Herpetic ritesh        Need for hepatitis C screening test        Screening for osteoporosis        Need for pneumococcal vaccination        Asymptomatic menopausal state           Care Instructions      Preventive Health Recommendations    Female Ages 65 +    Yearly exam:     See your health care provider every year in order to  o Review health changes.   o Discuss preventive care.    o Review your medicines if your doctor has prescribed any.      You no longer need a yearly Pap test unless you've had an abnormal Pap test in the past 10 years. If you have vaginal symptoms, such as bleeding or discharge, be sure to talk with your provider about a Pap test.      Every 1 to 2 years, have a mammogram.  If you are over 69, talk with your health care provider about whether or not you want to continue having screening mammograms.      Every 10 years, have a colonoscopy. Or, have a yearly FIT test (stool test). These exams will check for colon cancer.       Have a cholesterol test every 5 years, or more often if your doctor advises it.       Have a diabetes test (fasting glucose) every three years. If you are at risk for diabetes, you should have this test more often.       At age 65, have a bone density scan (DEXA) to check for osteoporosis (brittle bone disease).    Shots:    Get a flu shot each year.    Get a tetanus shot every 10 years.    Talk to your doctor about your  pneumonia vaccines. There are now two you should receive - Pneumovax (PPSV 23) and Prevnar (PCV 13).    Talk to your doctor about the shingles vaccine.    Talk to your doctor about the hepatitis B vaccine.    Nutrition:     Eat at least 5 servings of fruits and vegetables each day.      Eat whole-grain bread, whole-wheat pasta and brown rice instead of white grains and rice.      Talk to your provider about Calcium and Vitamin D.     Lifestyle    Exercise at least 150 minutes a week (30 minutes a day, 5 days a week). This will help you control your weight and prevent disease.      Limit alcohol to one drink per day.      No smoking.       Wear sunscreen to prevent skin cancer.       See your dentist twice a year for an exam and cleaning.      See your eye doctor every 1 to 2 years to screen for conditions such as glaucoma, macular degeneration and cataracts.          Follow-ups after your visit        Future tests that were ordered for you today     Open Future Orders        Priority Expected Expires Ordered    DX Hip/Pelvis/Spine Routine  6/9/2018 6/9/2017            Who to contact     If you have questions or need follow up information about today's clinic visit or your schedule please contact [facility] directly at [phone].  Normal or non-critical lab and imaging results will be communicated to you by MyChart, letter or phone within 4 business days after the clinic has received the results. If you do not hear from us within 7 days, please contact the clinic through [facility] or phone. If you have a critical or abnormal lab result, we will notify you by phone as soon as possible.  Submit refill requests through [employer] or call your pharmacy and they will forward the refill request to us. Please allow 3 business days for your refill to be completed.          Additional Information About Your Visit        [facility] Information     [employer] gives you secure access to your electronic health record. If you  "see a primary care provider, you can also send messages to your care team and make appointments. If you have questions, please call your primary care clinic.  If you do not have a primary care provider, please call [phone] and they will assist you.        Care EveryWhere ID     This is your Care EveryWhere ID. This could be used by other organizations to access your [place] medical records  [id number]        Your Vitals Were     Pulse Temperature Height Pulse Oximetry BMI (Body Mass Index)       69 98.8  F (37.1  C) (Tympanic) 5' 2\" (1.575 m) 99% 24.17 kg/m2        Blood Pressure from Last 3 Encounters:   06/07/17 122/62   01/17/17 98/52   01/09/17 120/68    Weight from Last 3 Encounters:   06/07/17 132 lb 2 oz (59.9 kg)   01/17/17 136 lb 4 oz (61.8 kg)   01/06/17 134 lb (60.8 kg)              We Performed the Following     ADMIN: Vaccine, Initial (40506)     Comprehensive metabolic panel     HPV High Risk Types DNA Cervical     Lipid Profile with reflex to direct LDL     Pap imaged thin layer screen with HPV - recommended age 30 - 65 years (select HPV order below)     Pneumococcal vaccine 13 valent PCV13 IM (Prevnar) [66584]          Today's Medication Changes          These changes are accurate as of: 6/9/17  8:59 AM.  If you have any questions, ask your nurse or doctor.               Start taking these medicines.        Dose/Directions    celecoxib 200 MG capsule   Commonly known as:  celeBREX   Used for:  Hip pain, left   Started by:  Jessie High MD        Dose:  200 mg   Take 1 capsule (200 mg) by mouth 2 times daily as needed for moderate pain   Quantity:  60 capsule   Refills:  1       valACYclovir 1000 mg tablet   Commonly known as:  VALTREX   Used for:  Herpetic ritesh   Started by:  Jessie High MD        Dose:  2000 mg   Take 2 tablets (2,000 mg) by mouth 2 times daily   Quantity:  4 tablet   Refills:  11            Where to get your medicines      These medications " were sent to [id number] - [place] - [address]  [address], [place] [address]     Phone:  [phone]     atenolol-chlorthalidone 100-25 MG per tablet    celecoxib 200 MG capsule    valACYclovir 1000 mg tablet         Some of these will need a paper prescription and others can be bought over the counter.  Ask your nurse if you have questions.     Bring a paper prescription for each of these medications     traMADol 50 MG tablet                Primary Care Provider Office Phone # Fax #    Jessie High [phone]       [facility] [address] [place] [address]        Thank you!     Thank you for choosing [facility]  for your care. Our goal is always to provide you with excellent care. Hearing back from our patients is one way we can continue to improve our services. Please take a few minutes to complete the written survey that you may receive in the mail after your visit with us. Thank you!             Your Updated Medication List - Protect others around you: Learn how to safely use, store and throw away your medicines at www.disposemymeds.org.          This list is accurate as of: 6/9/17  8:59 AM.  Always use your most recent med list.                   Brand Name Dispense Instructions for use    atenolol-chlorthalidone 100-25 MG per tablet    TENORETIC 100    90 tablet    Take 1 tablet by mouth daily       celecoxib 200 MG capsule    celeBREX    60 capsule    Take 1 capsule (200 mg) by mouth 2 times daily as needed for moderate pain       ibuprofen 200 MG tablet    ADVIL/MOTRIN    120 tablet    Take 3 tablets (600 mg) by mouth every 6 hours as needed for pain       lisinopril 10 MG tablet    PRINIVIL/ZESTRIL    90 tablet    Take 1 tablet (10 mg) by mouth daily       magnesium 250 MG tablet     30 tablet    Take 2 tablets by mouth At Bedtime       Multi-vitamin Tabs tablet     100 tablet    Take 1 tablet by mouth  every morning       OMEGA 3 500 500 MG Caps      Take 500 mg by mouth daily       omeprazole 20 MG CR capsule    priLOSEC    90 capsule    Take 1 capsule (20 mg) by mouth daily       Potassium Gluconate 2 MEQ Tabs     120 tablet    Take 2 tablets by mouth every morning       PROBIOTIC ACIDOPHILUS Caps      VSL 60       simvastatin 20 MG tablet    ZOCOR     Take 20 mg by mouth At Bedtime       traMADol 50 MG tablet    ULTRAM    28 tablet    Take 1 tablet (50 mg) by mouth every 8 hours as needed       TYLENOL 500 MG tablet   Generic drug:  acetaminophen      Take 1-2 tablets (500-1,000 mg) by mouth every 6 hours as needed for mild pain       valACYclovir 1000 mg tablet    VALTREX    4 tablet    Take 2 tablets (2,000 mg) by mouth 2 times daily       vitamin B complex with vitamin C Tabs tablet      Take 1 tablet by mouth daily

## 2017-06-09 NOTE — NURSING NOTE
"Chief Complaint   Patient presents with     Medicare Visit     welcome to medicare physical       Initial /62 (BP Location: Right arm, Patient Position: Chair, Cuff Size: Adult Regular)  Pulse 69  Temp 98.8  F (37.1  C) (Tympanic)  Ht 5' 2\" (1.575 m)  Wt 132 lb 2 oz (59.9 kg)  SpO2 99%  BMI 24.17 kg/m2 Estimated body mass index is 24.17 kg/(m^2) as calculated from the following:    Height as of this encounter: 5' 2\" (1.575 m).    Weight as of this encounter: 132 lb 2 oz (59.9 kg).  Medication Reconciliation: complete   Francine Kirk CMA  Prior to injection verified patient identity using patient's name and date of birth.    "

## 2017-06-12 ENCOUNTER — MYC MEDICAL ADVICE (OUTPATIENT)
Dept: PEDIATRICS | Facility: CLINIC | Age: 67
End: 2017-06-12

## 2017-06-13 ENCOUNTER — TELEPHONE (OUTPATIENT)
Dept: NURSING | Facility: CLINIC | Age: 67
End: 2017-06-13

## 2017-06-13 DIAGNOSIS — M19.90 OSTEOARTHRITIS, UNSPECIFIED OSTEOARTHRITIS TYPE, UNSPECIFIED SITE: Primary | ICD-10-CM

## 2017-06-13 NOTE — TELEPHONE ENCOUNTER
Received a PA request or Rx change from pt's pharmacy for the following medication:  Valacyclovir 1 gm tablet, insurance will cover only 1 tablet daily, please advise if we should change script or do PA?  celebrex 200 mg capsules is not covered, alternative med: meloxicam, naproxen, diclofenac potassium, nabumetone, please advise.    Insurance [phone] ID# [id number]  Francine Kirk CMA

## 2017-06-13 NOTE — TELEPHONE ENCOUNTER
Its not a daily med.  It is for outbreaks, 2 pills 2x daily for one day only.  They should cover this at this dose as it is standard treatment dosing for cold sores.      Please let her know that celebrex is not covered and see if she wants to try meloxicam.  It is still fewer doses than the ibuprofen she is taking.     Jessie High MD

## 2017-06-14 LAB
COPATH REPORT: ABNORMAL
PAP: ABNORMAL

## 2017-06-15 LAB
FINAL DIAGNOSIS: NORMAL
HPV HR 12 DNA CVX QL NAA+PROBE: NEGATIVE
HPV16 DNA SPEC QL NAA+PROBE: NEGATIVE
HPV18 DNA SPEC QL NAA+PROBE: NEGATIVE
SPECIMEN DESCRIPTION: NORMAL

## 2017-06-15 NOTE — TELEPHONE ENCOUNTER
Called [facility] [phone] ID# [id number], PA not needed for valacyclovir 1 gm tablet, it was a refill too soon. Pt picked up Rx today.  Called pt and LMTCB. Please ask pt if she is ok with trying meloxicam.  Francine Kirk, CMA

## 2017-06-16 PROBLEM — M19.90 OSTEOARTHRITIS, UNSPECIFIED OSTEOARTHRITIS TYPE, UNSPECIFIED SITE: Status: ACTIVE | Noted: 2017-06-16

## 2017-06-16 RX ORDER — MELOXICAM 15 MG/1
15 TABLET ORAL DAILY
Qty: 30 TABLET | Refills: 1 | Status: SHIPPED | OUTPATIENT
Start: 2017-06-16 | End: 2017-07-06

## 2017-06-21 ENCOUNTER — RESULT FOLLOW UP (OUTPATIENT)
Dept: PEDIATRICS | Facility: CLINIC | Age: 67
End: 2017-06-21

## 2017-06-21 DIAGNOSIS — R87.611 ATYPICAL SQUAMOUS CELLS CANNOT EXCLUDE HIGH GRADE SQUAMOUS INTRAEPITHELIAL LESION ON CYTOLOGIC SMEAR OF CERVIX (ASC-H): ICD-10-CM

## 2017-06-21 NOTE — PROGRESS NOTES
06/09/17 ASC-H, Neg HPV, Endometrial cells present. Plan for colp and possible EMB by 9/9/17 08/18/17 [employer]= SELENE 1. EMB= Benign. Plan 1 yr co-test  07/12/18: NIL pap, Neg HR HPV result. Plan 1 year cotest per provider.Pt was advised.(sk)  08/27/19: NIL Pap, Neg HR HPV result. Plan cotest in 3 years per provider. Results and recommendations released to the pt via [employer]. (sk)

## 2017-06-27 ENCOUNTER — TELEPHONE (OUTPATIENT)
Dept: OBGYN | Facility: CLINIC | Age: 67
End: 2017-06-27

## 2017-06-27 DIAGNOSIS — R87.618 UNEXPLAINED ENDOMETRIAL CELLS ON CERVICAL PAP SMEAR: Primary | ICD-10-CM

## 2017-06-28 NOTE — TELEPHONE ENCOUNTER
Spoke with patient.  Ultrasound order placed and scheduled.  Patient also scheduled for EMB/Flora.  Shanta Potts RN

## 2017-06-30 ENCOUNTER — TELEPHONE (OUTPATIENT)
Dept: OBGYN | Facility: CLINIC | Age: 67
End: 2017-06-30

## 2017-06-30 NOTE — TELEPHONE ENCOUNTER
Patient request Laura WALTER to call her ASAP , when she returns to office at [phone]. Patient had to reschedule her colposcopy due to she will be out of the office.     Thank you,    Milagro Loyola

## 2017-07-05 ENCOUNTER — OFFICE VISIT (OUTPATIENT)
Dept: URGENT CARE | Facility: URGENT CARE | Age: 67
End: 2017-07-05
Payer: MEDICARE

## 2017-07-05 VITALS
HEART RATE: 77 BPM | TEMPERATURE: 98.2 F | SYSTOLIC BLOOD PRESSURE: 120 MMHG | OXYGEN SATURATION: 99 % | WEIGHT: 131 LBS | DIASTOLIC BLOOD PRESSURE: 80 MMHG | BODY MASS INDEX: 23.96 KG/M2 | RESPIRATION RATE: 16 BRPM

## 2017-07-05 DIAGNOSIS — L03.211 FACIAL CELLULITIS: Primary | ICD-10-CM

## 2017-07-05 LAB
BASOPHILS # BLD AUTO: 0 10E9/L (ref 0–0.2)
BASOPHILS NFR BLD AUTO: 0.3 %
DEPRECATED S PYO AG THROAT QL EIA: NORMAL
DIFFERENTIAL METHOD BLD: ABNORMAL
EOSINOPHIL # BLD AUTO: 0.1 10E9/L (ref 0–0.7)
EOSINOPHIL NFR BLD AUTO: 1.2 %
ERYTHROCYTE [DISTWIDTH] IN BLOOD BY AUTOMATED COUNT: 11.1 % (ref 10–15)
HCT VFR BLD AUTO: 32.2 % (ref 35–47)
HGB BLD-MCNC: 11.3 G/DL (ref 11.7–15.7)
LYMPHOCYTES # BLD AUTO: 1 10E9/L (ref 0.8–5.3)
LYMPHOCYTES NFR BLD AUTO: 13.3 %
MCH RBC QN AUTO: 34.6 PG (ref 26.5–33)
MCHC RBC AUTO-ENTMCNC: 35.1 G/DL (ref 31.5–36.5)
MCV RBC AUTO: 99 FL (ref 78–100)
MICRO REPORT STATUS: NORMAL
MONOCYTES # BLD AUTO: 0.9 10E9/L (ref 0–1.3)
MONOCYTES NFR BLD AUTO: 11.8 %
NEUTROPHILS # BLD AUTO: 5.4 10E9/L (ref 1.6–8.3)
NEUTROPHILS NFR BLD AUTO: 73.4 %
PLATELET # BLD AUTO: 288 10E9/L (ref 150–450)
RBC # BLD AUTO: 3.27 10E12/L (ref 3.8–5.2)
SPECIMEN SOURCE: NORMAL
WBC # BLD AUTO: 7.3 10E9/L (ref 4–11)

## 2017-07-05 PROCEDURE — 36415 COLL VENOUS BLD VENIPUNCTURE: CPT | Performed by: PHYSICIAN ASSISTANT

## 2017-07-05 PROCEDURE — 87081 CULTURE SCREEN ONLY: CPT | Performed by: PHYSICIAN ASSISTANT

## 2017-07-05 PROCEDURE — 99214 OFFICE O/P EST MOD 30 MIN: CPT | Mod: 25 | Performed by: PHYSICIAN ASSISTANT

## 2017-07-05 PROCEDURE — 87880 STREP A ASSAY W/OPTIC: CPT | Performed by: PHYSICIAN ASSISTANT

## 2017-07-05 PROCEDURE — 85025 COMPLETE CBC W/AUTO DIFF WBC: CPT | Performed by: PHYSICIAN ASSISTANT

## 2017-07-05 PROCEDURE — 87040 BLOOD CULTURE FOR BACTERIA: CPT | Performed by: PHYSICIAN ASSISTANT

## 2017-07-05 PROCEDURE — 96372 THER/PROPH/DIAG INJ SC/IM: CPT | Performed by: PHYSICIAN ASSISTANT

## 2017-07-05 RX ORDER — CEFTRIAXONE 1 G/1
1000 INJECTION, POWDER, FOR SOLUTION INTRAMUSCULAR; INTRAVENOUS ONCE
Qty: 10 ML | Refills: 0 | OUTPATIENT
Start: 2017-07-05 | End: 2017-07-05

## 2017-07-05 RX ORDER — CLINDAMYCIN HCL 300 MG
300 CAPSULE ORAL 4 TIMES DAILY
Qty: 40 CAPSULE | Refills: 0 | Status: SHIPPED | OUTPATIENT
Start: 2017-07-05 | End: 2017-08-18

## 2017-07-05 NOTE — PROGRESS NOTES
"      SUBJECTIVE:    Aaliyah Hayes is a 67 y.o. Woman, who presents to  today with concerns about possible early facial cellulitis.  Patient states she woke with sensation of pain, redness and swelling of right upper lip. Throughout the day today, she notes sxs have spread to include left upper lip.  Patient has a past hx of severe facial cellulitis that she states, \"Started just like this but then I developed sores in my nose and a sore on my face before it got really bad.\" She is hoping to get started on abx tx now, \"before it gets worse.\" Admits to possible waxing and waning low-grade fever sxs (hasn't measured temp). Admits to mild malaise (but has been able to work and do all ADL's).  Admits to mild waxing and waning HA.     Pt is a dental hygienist by way of occupation. She did have x-ray evaluation to make sure no dental abscess today (with negative findings by her report).  She had a routine dental cleaning 2-3 weeks ago. Denies any other dental procedures for several years.        Past hx of Sepsis due to facial cellulitis 11/22/15. Review of [facility] discharge note states she was given Ancef and Vancomycin in ED. After admission, she was treated with IV Clindamycin. ID added Rocephin. Ultimately she was discharged home on oral Clindamycin and oral Keflex.     She has not had any serious skin infections since treatment in 2015.     ROS:     HEENT: Denies any nasal congestion, nasal sores, sinus pain or visual changes.  Denies any an-orbital pain, redness or swelling. Denies any mouth, throat or tongue swelling.   RESP: Denies any cough, wheezing or SOB   GI: Denies any N/V/D. No abdominal pain. Normal BM's  SKIN: Erythema and swelling upper lip as per above. No heat. No vesicular lesions or other hx of open skin wounds on face, lip, or mouth. No rash.   NEURO:  Positive mild, waxing and waning generalized HA (at worst 2/10 on pain scale) for past several days. Denies any severe headaches, neck " stiffness, photophobia, rash, mental status changes or lethargy.   RHEUM: Denies any red, warm, swollen joints. Denies any myalgias       Past Medical History:   Diagnosis Date     Atypical squamous cells cannot exclude high grade squamous intraepithelial lesion on cytologic smear of cervix (ASC-H) 06/09/2017 06/09/17 ASC-H, Neg HPV, Endometrial cells present     Facial cellulitis 11/22/2015     HTN (hypertension)      Perforated sigmoid colon (H) 1/6/2017       Current Outpatient Prescriptions:      meloxicam (MOBIC) 15 MG tablet, Take 1 tablet (15 mg) by mouth daily, Disp: 30 tablet, Rfl: 1     Omega-3 Fatty Acids (OMEGA 3 500) 500 MG CAPS, Take 500 mg by mouth daily, Disp: , Rfl:      Lactobacillus (PROBIOTIC ACIDOPHILUS) CAPS, VSL 60, Disp: , Rfl:      atenolol-chlorthalidone (TENORETIC 100) 100-25 MG per tablet, Take 1 tablet by mouth daily, Disp: 90 tablet, Rfl: 3     traMADol (ULTRAM) 50 MG tablet, Take 1 tablet (50 mg) by mouth every 8 hours as needed, Disp: 28 tablet, Rfl: 5     celecoxib (CELEBREX) 200 MG capsule, Take 1 capsule (200 mg) by mouth 2 times daily as needed for moderate pain, Disp: 60 capsule, Rfl: 1     valACYclovir (VALTREX) 1000 mg tablet, Take 2 tablets (2,000 mg) by mouth 2 times daily, Disp: 4 tablet, Rfl: 11     omeprazole (PRILOSEC) 20 MG CR capsule, Take 1 capsule (20 mg) by mouth daily, Disp: 90 capsule, Rfl: 1     lisinopril (PRINIVIL/ZESTRIL) 10 MG tablet, Take 1 tablet (10 mg) by mouth daily, Disp: 90 tablet, Rfl: 3     simvastatin (ZOCOR) 20 MG tablet, Take 20 mg by mouth At Bedtime, Disp: , Rfl:      acetaminophen (TYLENOL) 500 MG tablet, Take 1-2 tablets (500-1,000 mg) by mouth every 6 hours as needed for mild pain, Disp: , Rfl:      vitamin  B complex with vitamin C (VITAMIN  B COMPLEX) TABS, Take 1 tablet by mouth daily, Disp: , Rfl: 0     ibuprofen (ADVIL,MOTRIN) 200 MG tablet, Take 3 tablets (600 mg) by mouth every 6 hours as needed for pain, Disp: 120 tablet, Rfl:       magnesium 250 MG tablet, Take 2 tablets by mouth At Bedtime, Disp: 30 tablet, Rfl:      multivitamin, therapeutic with minerals (MULTI-VITAMIN) TABS, Take 1 tablet by mouth every morning , Disp: 100 tablet, Rfl: 3     Potassium Gluconate 2 MEQ TABS, Take 2 tablets by mouth every morning , Disp: 120 tablet, Rfl:     Allergies   Allergen Reactions     Ciprofloxacin      Facial swelling, tingling on her upper lip, throat slightly closed, racing heart     Sulfa Drugs      Eye reaction to sulfa-based eye drop     Component      Latest Ref Rng & Units 7/5/2017   Specimen Description       Throat   Rapid Strep A Screen       NEGATIVE: No Group A streptococcal antigen detected by immunoassay, await . . .   Micro Report Status       FINAL 07/05/2017     Component      Latest Ref Rng & Units 7/5/2017   WBC      4.0 - 11.0 10e9/L 7.3   RBC Count      3.8 - 5.2 10e12/L 3.27 (L)   Hemoglobin      11.7 - 15.7 g/dL 11.3 (L)   Hematocrit      35.0 - 47.0 % 32.2 (L)   MCV      78 - 100 fl 99   MCH      26.5 - 33.0 pg 34.6 (H)   MCHC      31.5 - 36.5 g/dL 35.1   RDW      10.0 - 15.0 % 11.1   Platelet Count      150 - 450 10e9/L 288   Diff Method       Automated Method   % Neutrophils      % 73.4   % Lymphocytes      % 13.3   % Monocytes      % 11.8   % Eosinophils      % 1.2   % Basophils      % 0.3   Absolute Neutrophil      1.6 - 8.3 10e9/L 5.4   Absolute Lymphocytes      0.8 - 5.3 10e9/L 1.0   Absolute Monocytes      0.0 - 1.3 10e9/L 0.9   Absolute Eosinophils      0.0 - 0.7 10e9/L 0.1   Absolute Basophils      0.0 - 0.2 10e9/L 0.0     Blood Culture: Pending.  Of note, after patient left clinic,  informed me he had no anaerobic tube (all out in lab) so only aerobic blood culture pending.       OBJECTIVE:  /80 (BP Location: Right arm, Cuff Size: Adult Regular)  Pulse 77  Temp 98.2  F (36.8  C) (Oral)  Resp 16  Wt 131 lb (59.4 kg)  SpO2 99%  BMI 23.96 kg/m2      General appearance: alert and no apparent  distress  SKIN: Positive mild, poorly demarcated, erythema just above upper lip (above [place] border). Positive uniform swelling of upper lip. No heat. This erythema and swelling  does NOT extend to nares, nasolabial fold, cheeks, lower lip, chin or any other portion of face. No lesions on face. color is pink and without rash.  HEENT:   Conjunctiva not injected.  Sclera clear.  Left TM is normal: no effusions, no erythema, and normal landmarks.  Right TM is normal: no effusions, no erythema, and normal landmarks.  Nasal mucosa is normal.  Specifically no intra nasal lesions.   Oropharyngeal exam is normal: no lesions, erythema, adenopathy or exudate. No intra-oral swelling or erythema. No lesions, erythema, pain or swelling on buccal mucosa or floor of mouth.   Neck is supple.  Aaliyah has FROM neck without any pain today. No adenopathy. Specifically no lateral neck swelling.   CARDIAC:NORMAL - regular rate and rhythm without murmur.  RESP: Normal - CTA without rales, rhonchi, or wheezing.  NEURO: Alert and oriented.  Normal speech and mentation.  CN II/XII grossly intact.  Gait within normal limits.        ASSESSMENT/PLAN:    (L03.211) Facial cellulitis  (primary encounter diagnosis)  Comment: Early facial cellulitis in woman with pmhx of of severe facial cellulitis as per above.   Plan: CBC with platelets differential, cefTRIAXone         (ROCEPHIN) 1 GM vial, Blood culture, Rapid         strep screen, Beta strep group A culture,         clindamycin (CLEOCIN) 300 MG capsule,         CEFTRIAXONE NA INJ /250MG, INJECTION         INTRAMUSCULAR OR SUB-Q    1.  Blood cultures obtained prior to Rocephin   2. Rocephin (1 gram IM) given here   3. Started on PO Clindamycin   4. MA assisted in getting follow-up apt tomorrow morning with PCP for re-evaluation.   5.  Patient directed to go directly to ER if any increased redness, swelling, pain, increase in mild HA, fever, neck stiffness, unusual drowsiness or confusion.   "  6.  In addition to the above, \"red flag\" signs and sxs are reviewed with pt both verbally and by way of printed educational material for home review.  Pt verbalizes understanding of and agrees to the above plan.           "

## 2017-07-05 NOTE — PATIENT INSTRUCTIONS
Facial Cellulitis  Cellulitis is an infection of the deep layers of skin. A break in the skin, such as a cut or scratch, can let bacteria under the skin. It may also occur from an infected oil gland (pimple) or hair follicle. If the bacteria get to deep layers of the skin, it can be serious. If not treated, cellulitis can get into the bloodstream and lymph nodes. The infection can then spread throughout the body. This causes serious illness.  Cellulitis causes the affected skin to become red, swollen, warm, and sore. The reddened areas have a visible border. You may have a fever, chills, and pain.  Cellulitis is treated with antibiotics taken for 7 to 10 days. Symptoms should get better 1 to 2 days after treatment is started. Make sure to take all the antibiotics for the full number of days until they are gone. Keep taking the medication even if your symptoms go away.  Home care  Follow these tips:    Take all of the antibiotic medicine exactly as directed until it is gone. Don t miss any doses, especially during the first 7 days. Don t stop taking it when your symptoms get better.    Use a cool compress (face cloth soaked in cool water) on your face to help reduce swelling and pain.    You may use acetaminophen or ibuprofen to reduce pain. Don t use these if you have chronic liver or kidney disease, or ever had a stomach ulcer or gastrointestinal bleeding. Talk with your healthcare provider first.  Follow-up care  Follow up with your healthcare provider, or as advised. If your infection does not go away on the first antibiotic, your healthcare provider will prescribe a different one.  When to seek medical advice  Call your healthcare provider right away if any of these occur:    Fever higher of 100.4  F (38.0  C) or higher after 2 days on antibiotics    Red areas that spread    Swelling or pain that gets worse    Fluid leaking from the skin (pus)    An eyelid that swells shut or leaks fluid (pus)    Headache or  neck pain that gets worse    Unusual drowsiness or confusion    Convulsions (seizure)    Change in eyesight     Date Last Reviewed: 9/1/2016 2000-2017 [employer]. [address] [place] [address], [place] [address]. All rights reserved. This information is not intended as a substitute for professional medical care. Always follow your healthcare professional's instructions.

## 2017-07-05 NOTE — NURSING NOTE
"Aaliyah Hayes is a 67 year old female.      Chief Complaint   Patient presents with     Urgent Care     Derm Problem     pt is here for a possible infection on her face - it started this am and is getting worse - in 2015 she has a hx of having facial erysipelas - it is asting somewhat like that but there's no sore ??       Initial /80 (BP Location: Right arm, Cuff Size: Adult Regular)  Pulse 77  Temp 98.2  F (36.8  C) (Oral)  Resp 16  Wt 59.4 kg (131 lb)  SpO2 99%  BMI 23.96 kg/m2 Estimated body mass index is 23.96 kg/(m^2) as calculated from the following:    Height as of 6/7/17: 1.575 m (5' 2\").    Weight as of this encounter: 59.4 kg (131 lb).  Medication Reconciliation: complete      Questioned patient about current smoking habits.  Pt. has never smoked.      Valentina Reyna CMA      "

## 2017-07-05 NOTE — MR AVS SNAPSHOT
After Visit Summary   7/5/2017    Aaliyah Hayes    MRN: [id number]           Patient Information     Date Of Birth          1950        Visit Information        Provider Department      7/5/2017 4:45 PM Richi Livingston PA-C [facility]        Today's Diagnoses     Facial cellulitis    -  1      Care Instructions      Facial Cellulitis  Cellulitis is an infection of the deep layers of skin. A break in the skin, such as a cut or scratch, can let bacteria under the skin. It may also occur from an infected oil gland (pimple) or hair follicle. If the bacteria get to deep layers of the skin, it can be serious. If not treated, cellulitis can get into the bloodstream and lymph nodes. The infection can then spread throughout the body. This causes serious illness.  Cellulitis causes the affected skin to become red, swollen, warm, and sore. The reddened areas have a visible border. You may have a fever, chills, and pain.  Cellulitis is treated with antibiotics taken for 7 to 10 days. Symptoms should get better 1 to 2 days after treatment is started. Make sure to take all the antibiotics for the full number of days until they are gone. Keep taking the medication even if your symptoms go away.  Home care  Follow these tips:    Take all of the antibiotic medicine exactly as directed until it is gone. Don t miss any doses, especially during the first 7 days. Don t stop taking it when your symptoms get better.    Use a cool compress (face cloth soaked in cool water) on your face to help reduce swelling and pain.    You may use acetaminophen or ibuprofen to reduce pain. Don t use these if you have chronic liver or kidney disease, or ever had a stomach ulcer or gastrointestinal bleeding. Talk with your healthcare provider first.  Follow-up care  Follow up with your healthcare provider, or as advised. If your infection does not go away on the first antibiotic, your healthcare  provider will prescribe a different one.  When to seek medical advice  Call your healthcare provider right away if any of these occur:    Fever higher of 100.4  F (38.0  C) or higher after 2 days on antibiotics    Red areas that spread    Swelling or pain that gets worse    Fluid leaking from the skin (pus)    An eyelid that swells shut or leaks fluid (pus)    Headache or neck pain that gets worse    Unusual drowsiness or confusion    Convulsions (seizure)    Change in eyesight     Date Last Reviewed: 9/1/2016 2000-2017 [employer]. [address] [place] [address]. All rights reserved. This information is not intended as a substitute for professional medical care. Always follow your healthcare professional's instructions.                Follow-ups after your visit        Your next 10 appointments already scheduled     Jul 06, 2017  9:30 AM CDT   Office Visit with Jeniffer Glasgow MD   [facility]    [address] [place] [address]  [address]  [facility] [address] [phone]           Bring a current list of meds and any records pertaining to this visit.  For Physicals, please bring immunization records and any forms needing to be filled out.  Please arrive 10 minutes early to complete paperwork.            Jul 14, 2017  9:45 AM CDT   Ul[facility]ound with OXUS1   [facility]    [address] [id number] [phone]            Aug 18, 2017  2:00 PM CDT   Office Visit with Lien Daniel MD   [facility]    [address] [place] [address]  [address]  [facility] [address] [phone]           Bring a current list of meds and any records pertaining to this visit.  For Physicals, please bring immunization records and any forms needing to be filled out.  Please arrive 10 minutes early to complete paperwork.              Who to  contact     If you have questions or need follow up information about today's clinic visit or your schedule please contact [facility] directly at [phone].  Normal or non-critical lab and imaging results will be communicated to you by [email or web address], letter or phone within 4 business days after the clinic has received the results. If you do not hear from us within 7 days, please contact the clinic through [email or web address] or phone. If you have a critical or abnormal lab result, we will notify you by phone as soon as possible.  Submit refill requests through Photofy or call your pharmacy and they will forward the refill request to us. Please allow 3 business days for your refill to be completed.          Additional Information About Your Visit        Photofy Information     [employer] gives you secure access to your electronic health record. If you see a primary care provider, you can also send messages to your care team and make appointments. If you have questions, please call your primary care clinic.  If you do not have a primary care provider, please call [phone] and they will assist you.        Care EveryWhere ID     This is your Care EveryWhere ID. This could be used by other organizations to access your [place] medical records  [id number]        Your Vitals Were     Pulse Temperature Respirations Pulse Oximetry BMI (Body Mass Index)       77 98.2  F (36.8  C) (Oral) 16 99% 23.96 kg/m2        Blood Pressure from Last 3 Encounters:   07/05/17 120/80   06/07/17 122/62   01/17/17 98/52    Weight from Last 3 Encounters:   07/05/17 131 lb (59.4 kg)   06/07/17 132 lb 2 oz (59.9 kg)   01/17/17 136 lb 4 oz (61.8 kg)              We Performed the Following     Beta strep group A culture     Blood culture     CBC with platelets differential     Rapid strep screen          Today's Medication Changes          These changes are accurate as of: 7/5/17  6:37 PM.  If you have any questions, ask your nurse or doctor.                Start taking these medicines.        Dose/Directions    cefTRIAXone 1 GM vial   Commonly known as:  ROCEPHIN   Used for:  Facial cellulitis   Started by:  Richi Livingston PA-C        Dose:  1000 mg   Inject 1 g (1,000 mg) into the muscle once for 1 dose   Quantity:  10 mL   Refills:  0       clindamycin 300 MG capsule   Commonly known as:  CLEOCIN   Used for:  Facial cellulitis   Started by:  Richi Livingston PA-C        Dose:  300 mg   Take 1 capsule (300 mg) by mouth 4 times daily   Quantity:  40 capsule   Refills:  0            Where to get your medicines      These medications were sent to [id number]  [place], [place] - [address]  [address] [facility] [address]     Phone:  [phone]     clindamycin 300 MG capsule         Some of these will need a paper prescription and others can be bought over the counter.  Ask your nurse if you have questions.     You don't need a prescription for these medications     cefTRIAXone 1 GM vial                Primary Care Provider Office Phone # Fax #    Jessie High [phone]       [facility] [address] [place] [address] [address] [place] [address]        Equal Access to Services     [facility]: Hadii darrius wadeo Sojosi, waaxda luqadaha, qaybta kaalmada adeegyada, shaq traore. So [facility] [phone].    ATENCIÓN: Si habla español, tiene a coates disposición servicios gratuitos de asistencia lingüística. Llame al [phone].    We comply with applicable federal civil rights laws and [place] laws. We do not discriminate on the basis of race, color, national origin, age, disability sex, sexual orientation or gender identity.            Thank you!     Thank you for choosing [facility]  for your care. Our goal is always to provide you with excellent care. Hearing back from our patients is one way we can continue to improve our  services. Please take a few minutes to complete the written survey that you may receive in the mail after your visit with us. Thank you!             Your Updated Medication List - Protect others around you: Learn how to safely use, store and throw away your medicines at www.disposemymeds.org.          This list is accurate as of: 7/5/17  6:37 PM.  Always use your most recent med list.                   Brand Name Dispense Instructions for use Diagnosis    atenolol-chlorthalidone 100-25 MG per tablet    TENORETIC 100    90 tablet    Take 1 tablet by mouth daily    Benign essential hypertension       cefTRIAXone 1 GM vial    ROCEPHIN    10 mL    Inject 1 g (1,000 mg) into the muscle once for 1 dose    Facial cellulitis       celecoxib 200 MG capsule    celeBREX    60 capsule    Take 1 capsule (200 mg) by mouth 2 times daily as needed for moderate pain    Hip pain, left       clindamycin 300 MG capsule    CLEOCIN    40 capsule    Take 1 capsule (300 mg) by mouth 4 times daily    Facial cellulitis       ibuprofen 200 MG tablet    ADVIL/MOTRIN    120 tablet    Take 3 tablets (600 mg) by mouth every 6 hours as needed for pain        lisinopril 10 MG tablet    PRINIVIL/ZESTRIL    90 tablet    Take 1 tablet (10 mg) by mouth daily    Benign essential hypertension       magnesium 250 MG tablet     30 tablet    Take 2 tablets by mouth At Bedtime        meloxicam 15 MG tablet    MOBIC    30 tablet    Take 1 tablet (15 mg) by mouth daily    Osteoarthritis, unspecified osteoarthritis type, unspecified site       Multi-vitamin Tabs tablet     100 tablet    Take 1 tablet by mouth every morning        OMEGA 3 500 500 MG Caps      Take 500 mg by mouth daily        omeprazole 20 MG CR capsule    priLOSEC    90 capsule    Take 1 capsule (20 mg) by mouth daily    Gastroesophageal reflux disease without esophagitis       Potassium Gluconate 2 MEQ Tabs     120 tablet    Take 2 tablets by mouth every morning        PROBIOTIC ACIDOPHILUS  Caps      VSL 60        simvastatin 20 MG tablet    ZOCOR     Take 20 mg by mouth At Bedtime        traMADol 50 MG tablet    ULTRAM    28 tablet    Take 1 tablet (50 mg) by mouth every 8 hours as needed    Hip pain, left       TYLENOL 500 MG tablet   Generic drug:  acetaminophen      Take 1-2 tablets (500-1,000 mg) by mouth every 6 hours as needed for mild pain        valACYclovir 1000 mg tablet    VALTREX    4 tablet    Take 2 tablets (2,000 mg) by mouth 2 times daily    Herpetic ritesh       vitamin B complex with vitamin C Tabs tablet      Take 1 tablet by mouth daily

## 2017-07-06 ENCOUNTER — OFFICE VISIT (OUTPATIENT)
Dept: PEDIATRICS | Facility: CLINIC | Age: 67
End: 2017-07-06
Payer: MEDICARE

## 2017-07-06 VITALS
BODY MASS INDEX: 23.96 KG/M2 | WEIGHT: 131 LBS | HEART RATE: 64 BPM | TEMPERATURE: 98 F | SYSTOLIC BLOOD PRESSURE: 110 MMHG | DIASTOLIC BLOOD PRESSURE: 70 MMHG

## 2017-07-06 DIAGNOSIS — L03.211 CELLULITIS OF FACE: Primary | ICD-10-CM

## 2017-07-06 PROCEDURE — 99213 OFFICE O/P EST LOW 20 MIN: CPT | Mod: GE | Performed by: STUDENT IN AN ORGANIZED HEALTH CARE EDUCATION/TRAINING PROGRAM

## 2017-07-06 NOTE — MR AVS SNAPSHOT
After Visit Summary   7/6/2017    Aaliyah Hayes    MRN: [id number]           Patient Information     Date Of Birth          1950        Visit Information        Provider Department      7/6/2017 9:30 AM Jeniffer Glasgow MD [facility] [place]    1. Continue prescribed clindamycin for a total of 7 days - I do not think you need a 10 day course as you improved so rapidly.  2. Clindamycin can cause diarrhea. If you develop several episodes of diarrhea, stop taking the clindamycin and call your primary care doctor. To help prevent this, you can take probiotics daily for 1-2 weeks even after you complete the clindamycin.  3. If your facial swelling worsens, you develop a fever (greater than 100.4F) go to the Emergency Room.      Facial Cellulitis  Cellulitis is an infection of the deep layers of skin. A break in the skin, such as a cut or scratch, can let bacteria under the skin. It may also occur from an infected oil gland (pimple) or hair follicle. If the bacteria get to deep layers of the skin, it can be serious. If not treated, cellulitis can get into the bloodstream and lymph nodes. The infection can then spread throughout the body. This causes serious illness.  Cellulitis causes the affected skin to become red, swollen, warm, and sore. The reddened areas have a visible border. You may have a fever, chills, and pain.  Cellulitis is treated with antibiotics taken for 7 to 10 days. Symptoms should get better 1 to 2 days after treatment is started. Make sure to take all the antibiotics for the full number of days until they are gone. Keep taking the medication even if your symptoms go away.  Home care  Follow these tips:    Take all of the antibiotic medicine exactly as directed until it is gone. Don t miss any doses, especially during the first 7 days. Don t stop taking it when your symptoms get better.    Use a cool compress (face cloth soaked in cool water) on your  face to help reduce swelling and pain.    You may use acetaminophen or ibuprofen to reduce pain. Don t use these if you have chronic liver or kidney disease, or ever had a stomach ulcer or gastrointestinal bleeding. Talk with your healthcare provider first.  Follow-up care  Follow up with your healthcare provider, or as advised. If your infection does not go away on the first antibiotic, your healthcare provider will prescribe a different one.  When to seek medical advice  Call your healthcare provider right away if any of these occur:    Fever higher of 100.4  F (38.0  C) or higher after 2 days on antibiotics    Red areas that spread    Swelling or pain that gets worse    Fluid leaking from the skin (pus)    An eyelid that swells shut or leaks fluid (pus)    Headache or neck pain that gets worse    Unusual drowsiness or confusion    Convulsions (seizure)    Change in eyesight     Date Last Reviewed: 9/1/2016 2000-2017 [employer]. [address] [place] [address]. All rights reserved. This information is not intended as a substitute for professional medical care. Always follow your healthcare professional's instructions.                Follow-ups after your visit        Your next 10 appointments already scheduled     Jul 14, 2017  9:45 AM CDT   Ultrasound with OXUS1   [facility]    [address] [address] [phone]            Aug 18, 2017  2:00 PM CDT   Office Visit with Lien Daniel MD   [facility]    [address]  [facility] [id number] [phone]           Bring a current list of meds and any records pertaining to this visit.  For Physicals, please bring immunization records and any forms needing to be filled out.  Please arrive 10 minutes early to complete paperwork.              Who to contact     If you have questions  or need follow up information about today's clinic visit or your schedule please contact [facility] directly at [phone].  Normal or non-critical lab and imaging results will be communicated to you by [employer], letter or phone within 4 business days after the clinic has received the results. If you do not hear from us within 7 days, please contact the clinic through [employer] or phone. If you have a critical or abnormal lab result, we will notify you by phone as soon as possible.  Submit refill requests through [employer] or call your pharmacy and they will forward the refill request to us. Please allow 3 business days for your refill to be completed.          Additional Information About Your Visit        [employer] Information     [employer] gives you secure access to your electronic health record. If you see a primary care provider, you can also send messages to your care team and make appointments. If you have questions, please call your primary care clinic.  If you do not have a primary care provider, please call [phone] and they will assist you.        Care EveryWhere ID     This is your Care EveryWhere ID. This could be used by other organizations to access your [facility] medical records  [id number]        Your Vitals Were     Pulse Temperature BMI (Body Mass Index)             64 98  F (36.7  C) (Oral) 23.96 kg/m2          Blood Pressure from Last 3 Encounters:   07/06/17 110/70   07/05/17 120/80   06/07/17 122/62    Weight from Last 3 Encounters:   07/06/17 131 lb (59.4 kg)   07/05/17 131 lb (59.4 kg)   06/07/17 132 lb 2 oz (59.9 kg)              Today, you had the following     No orders found for display         Today's Medication Changes          These changes are accurate as of: 7/6/17 10:28 AM.  If you have any questions, ask your nurse or doctor.               Stop taking these medicines if you haven't already. Please contact your care team if you have questions.     celecoxib 200 MG capsule    Commonly known as:  celeBREX   Stopped by:  Jeniffer Glasgow MD           meloxicam 15 MG tablet   Commonly known as:  MOBIC   Stopped by:  Jeniffer Glasgow MD                    Primary Care Provider Office Phone # Fax #    Jessie High [phone]       [facility] [address] [place] [address]        Equal Access to Services     [facility] AH: Hadii aad ku hadasho Soomaali, waaxda luqadaha, qaybta kaalmada adeegyada, waxay idiin hayaan adeeg kharash la'aan ah. So [facility] [phone].    ATENCIÓN: Si melinda ibarra, tiene a coates disposición servicios gratuitos de asistencia lingüística. [facility] [phone].    We comply with applicable federal civil rights laws and [place] laws. We do not discriminate on the basis of race, color, national origin, age, disability sex, sexual orientation or gender identity.            Thank you!     Thank you for choosing [facility]  for your care. Our goal is always to provide you with excellent care. Hearing back from our patients is one way we can continue to improve our services. Please take a few minutes to complete the written survey that you may receive in the mail after your visit with us. Thank you!             Your Updated Medication List - Protect others around you: Learn how to safely use, store and throw away your medicines at www.disposemymeds.org.          This list is accurate as of: 7/6/17 10:28 AM.  Always use your most recent med list.                   Brand Name Dispense Instructions for use Diagnosis    atenolol-chlorthalidone 100-25 MG per tablet    TENORETIC 100    90 tablet    Take 1 tablet by mouth daily    Benign essential hypertension       clindamycin 300 MG capsule    CLEOCIN    40 capsule    Take 1 capsule (300 mg) by mouth 4 times daily    Facial cellulitis       ibuprofen 200 MG tablet    ADVIL/MOTRIN    120 tablet    Take 3 tablets (600 mg) by mouth every 6 hours as needed for pain         lisinopril 10 MG tablet    PRINIVIL/ZESTRIL    90 tablet    Take 1 tablet (10 mg) by mouth daily    Benign essential hypertension       magnesium 250 MG tablet     30 tablet    Take 2 tablets by mouth At Bedtime        Multi-vitamin Tabs tablet     100 tablet    Take 1 tablet by mouth every morning        OMEGA 3 500 500 MG Caps      Take 500 mg by mouth daily        omeprazole 20 MG CR capsule    priLOSEC    90 capsule    Take 1 capsule (20 mg) by mouth daily    Gastroesophageal reflux disease without esophagitis       Potassium Gluconate 2 MEQ Tabs     120 tablet    Take 2 tablets by mouth every morning        PROBIOTIC ACIDOPHILUS Caps      VSL 60        simvastatin 20 MG tablet    ZOCOR     Take 20 mg by mouth At Bedtime        traMADol 50 MG tablet    ULTRAM    28 tablet    Take 1 tablet (50 mg) by mouth every 8 hours as needed    Hip pain, left       TYLENOL 500 MG tablet   Generic drug:  acetaminophen      Take 1-2 tablets (500-1,000 mg) by mouth every 6 hours as needed for mild pain        valACYclovir 1000 mg tablet    VALTREX    4 tablet    Take 2 tablets (2,000 mg) by mouth 2 times daily    Herpetic ritesh       vitamin B complex with vitamin C Tabs tablet      Take 1 tablet by mouth daily

## 2017-07-06 NOTE — PATIENT INSTRUCTIONS
1. Continue prescribed clindamycin for a total of 7 days - I do not think you need a 10 day course as you improved so rapidly.  2. Clindamycin can cause diarrhea. If you develop several episodes of diarrhea, stop taking the clindamycin and call your primary care doctor. To help prevent this, you can take probiotics daily for 1-2 weeks even after you complete the clindamycin.  3. If your facial swelling worsens, you develop a fever (greater than 100.4F) go to the Emergency Room.      Facial Cellulitis  Cellulitis is an infection of the deep layers of skin. A break in the skin, such as a cut or scratch, can let bacteria under the skin. It may also occur from an infected oil gland (pimple) or hair follicle. If the bacteria get to deep layers of the skin, it can be serious. If not treated, cellulitis can get into the bloodstream and lymph nodes. The infection can then spread throughout the body. This causes serious illness.  Cellulitis causes the affected skin to become red, swollen, warm, and sore. The reddened areas have a visible border. You may have a fever, chills, and pain.  Cellulitis is treated with antibiotics taken for 7 to 10 days. Symptoms should get better 1 to 2 days after treatment is started. Make sure to take all the antibiotics for the full number of days until they are gone. Keep taking the medication even if your symptoms go away.  Home care  Follow these tips:    Take all of the antibiotic medicine exactly as directed until it is gone. Don t miss any doses, especially during the first 7 days. Don t stop taking it when your symptoms get better.    Use a cool compress (face cloth soaked in cool water) on your face to help reduce swelling and pain.    You may use acetaminophen or ibuprofen to reduce pain. Don t use these if you have chronic liver or kidney disease, or ever had a stomach ulcer or gastrointestinal bleeding. Talk with your healthcare provider first.  Follow-up care  Follow up with your  healthcare provider, or as advised. If your infection does not go away on the first antibiotic, your healthcare provider will prescribe a different one.  When to seek medical advice  Call your healthcare provider right away if any of these occur:    Fever higher of 100.4  F (38.0  C) or higher after 2 days on antibiotics    Red areas that spread    Swelling or pain that gets worse    Fluid leaking from the skin (pus)    An eyelid that swells shut or leaks fluid (pus)    Headache or neck pain that gets worse    Unusual drowsiness or confusion    Convulsions (seizure)    Change in eyesight     Date Last Reviewed: 9/1/2016 2000-2017 [employer]. [address] [place] [address], [place] [address]. All rights reserved. This information is not intended as a substitute for professional medical care. Always follow your healthcare professional's instructions.

## 2017-07-06 NOTE — PROGRESS NOTES
"  SUBJECTIVE:                                                    Aaliyah Hayes is a 67 year old female who presents to clinic today for the following health issues:      ED/UC Followup:    Facility:  [facility]  Date of visit: 7/5/17  Reason for visit: facial cellulitis  Current Status: feeling better     Patient is a 66 yo female with PMH of HTN and OA who presents to clinic today for UC follow up. 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 Patient reports some tingling as well but no pain, no skin lesions, no warmth; she has otherwise been well with no fevers, chills, or URI symptoms. Thought it was dental source so did an XR in the office which was negative for any dental process. Dentist prescribed amoxicillin 1000 mg of which she took 1 dose. Throughout the day, her symptoms progressed; by the end of the day, swelling had spread from right upper lip to left lip and left side of face. She states that her sxs felt \"just like when I had cellulitis last time.\" Patient has a history of sepsis due to facial cellulitis in Nov 2015; she was admitted from 11/22 to 11/26, treated with IV abx, and ultimately d/c home on PO clindamycin and cephalexin. This was thought to be likely strep erysipelas. At that time, she had also had fevers, chills, leukocytosis, facial swelling with erythema, and sores in her nares.     Patient went to UC for evaluation yesterday 7/5.  Noted erythema and swelling of upper lip. CBC showed mild anemia but no leukocytosis. Blood culture (aerobic bottle drawn only) negative for 12 hours. Rapid strep A throat swab negative, culture pending. She was given IM ceftriaxone 1 gram in UC and sent home on PO Clindamycin 300 mg QID for 10 days for presumed early facial cellulitis.    Today, patient feels much improved. Swelling has decreased. Tingling sensation is gone. Continues " to deny fevers or chills. No nausea, vomiting, vision changes, headache, dental pain. Has taken 2 doses of the clindamycin with no GI upset or diarrhea. Does take probiotic BID normally and continues to take this now.    Problem list and histories reviewed & adjusted, as indicated.  Additional history: as documented    Patient Active Problem List   Diagnosis     Benign essential hypertension     Hyperlipidemia with target LDL less than 130     Insomnia     Allergic rhinitis due to other allergen     Advanced directives, counseling/discussion     Atypical squamous cells cannot exclude high grade squamous intraepithelial lesion on cytologic smear of cervix (ASC-H)     Perforated sigmoid colon (H)     Osteoarthritis, unspecified osteoarthritis type, unspecified site     Past Surgical History:   Procedure Laterality Date     APPENDECTOMY  2012     COLECTOMY LEFT N/A 1/6/2017    Procedure: COLECTOMY LEFT;  Surgeon: David Gaitan MD;  Location:  OR     COLONOSCOPY N/A 1/6/2017    Procedure: COLONOSCOPY;  Surgeon: Regis Mckoy MD;  Location:  GI     LAPAROSCOPIC ASSISTED SIGMOID COLECTOMY N/A 1/6/2017    Procedure: LAPAROSCOPIC ASSISTED SIGMOID COLECTOMY;  Surgeon: David Gaitan MD;  Location:  OR     ORTHOPEDIC SURGERY       SURGICAL HISTORY OF -   09/2010    Rotator cuff repair-right shoulder     SURGICAL HISTORY OF -   12/2011    Right hip replacement     SURGICAL HISTORY OF -   2005    ACL and MCL left knee       Social History   Substance Use Topics     Smoking status: Never Smoker     Smokeless tobacco: Never Used     Alcohol use 0.0 oz/week     0 Standard drinks or equivalent per week      Comment: 1 glass of wine per night     Family History   Problem Relation Age of Onset     DIABETES Mother 84     Type 2     Hypertension Mother      Alcohol/Drug Father      C.A.D. Paternal Grandmother      Colon Cancer No family hx of          Current Outpatient Prescriptions   Medication Sig Dispense  Refill     clindamycin (CLEOCIN) 300 MG capsule Take 1 capsule (300 mg) by mouth 4 times daily 40 capsule 0     Omega-3 Fatty Acids (OMEGA 3 500) 500 MG CAPS Take 500 mg by mouth daily       Lactobacillus (PROBIOTIC ACIDOPHILUS) CAPS VSL 60       atenolol-chlorthalidone (TENORETIC 100) 100-25 MG per tablet Take 1 tablet by mouth daily 90 tablet 3     traMADol (ULTRAM) 50 MG tablet Take 1 tablet (50 mg) by mouth every 8 hours as needed 28 tablet 5     valACYclovir (VALTREX) 1000 mg tablet Take 2 tablets (2,000 mg) by mouth 2 times daily 4 tablet 11     omeprazole (PRILOSEC) 20 MG CR capsule Take 1 capsule (20 mg) by mouth daily 90 capsule 1     lisinopril (PRINIVIL/ZESTRIL) 10 MG tablet Take 1 tablet (10 mg) by mouth daily 90 tablet 3     simvastatin (ZOCOR) 20 MG tablet Take 20 mg by mouth At Bedtime       acetaminophen (TYLENOL) 500 MG tablet Take 1-2 tablets (500-1,000 mg) by mouth every 6 hours as needed for mild pain       vitamin  B complex with vitamin C (VITAMIN  B COMPLEX) TABS Take 1 tablet by mouth daily  0     ibuprofen (ADVIL,MOTRIN) 200 MG tablet Take 3 tablets (600 mg) by mouth every 6 hours as needed for pain 120 tablet      magnesium 250 MG tablet Take 2 tablets by mouth At Bedtime 30 tablet      multivitamin, therapeutic with minerals (MULTI-VITAMIN) TABS Take 1 tablet by mouth every morning  100 tablet 3     Potassium Gluconate 2 MEQ TABS Take 2 tablets by mouth every morning  120 tablet      Allergies   Allergen Reactions     Ciprofloxacin      Facial swelling, tingling on her upper lip, throat slightly closed, racing heart     Sulfa Drugs      Eye reaction to sulfa-based eye drop     BP Readings from Last 3 Encounters:   07/06/17 110/70   07/05/17 120/80   06/07/17 122/62    Wt Readings from Last 3 Encounters:   07/06/17 131 lb (59.4 kg)   07/05/17 131 lb (59.4 kg)   06/07/17 132 lb 2 oz (59.9 kg)            Reviewed and updated as needed this visit by Provider: none         ROS:  C: NEGATIVE for  fever, chills  I: NEGATIVE for worrisome rashes, moles or lesions  E: NEGATIVE for vision changes   E/M: POSITIVE for lip swelling  R: NEGATIVE for cough or SOB, difficulty swallowing or speaking  CV: NEGATIVE for chest pain or peripheral edema  GI: NEGATIVE for nausea, abdominal pain, or change in bowel habits  : NEGATIVE for dysuria  M: NEGATIVE for significant arthralgias or myalgia  N: NEGATIVE for weakness, dizziness or paresthesias  P: NEGATIVE for changes in mood or affect    OBJECTIVE:     /70 (Cuff Size: Adult Regular)  Pulse 64  Temp 98  F (36.7  C) (Oral)  Wt 131 lb (59.4 kg)  BMI 23.96 kg/m2  Body mass index is 23.96 kg/(m^2).  GENERAL: healthy, alert and no distress  EYES: Eyes grossly normal to inspection, PERRL and conjunctivae and sclerae normal  HENT: ears grossly normal, nose and mouth without ulcers or lesions. Lips appear symmetrical. No erythema, no swelling, no warmth, no tenderness to palpation. Oral mucosa normal. Teeth normal.   NECK: no adenopathy, no asymmetry  RESP: lungs clear to auscultation - no rales, rhonchi or wheezes  CV: regular rate and rhythm, normal S1 S2, no murmur, click or rub, no peripheral edema  ABDOMEN: soft, nontender, bowel sounds normal  MS: no gross musculoskeletal defects noted, no edema  SKIN: no suspicious lesions or rashes  NEURO: Normal strength and tone, mentation intact and speech normal  PSYCH: mentation appears normal, affect normal/bright    Diagnostic Test Results:  none     ASSESSMENT/PLAN:     Hypertension; controlled   Associated with the following complications:    None   Plan:  No changes in the patient's current treatment plan    1. Cellulitis of face  On clindamycin from UC. Symptoms significantly improved according to patient s/p IM ceftriaxone and PO clinda. No red flag symptoms. Does not appear toxic. So far tolerating abx well.  - Continue clindamycin 300mg QID for total 7 days for cellulitis  - Continue to take probiotics   - Come  back to clinic or go to the ED if having fevers, worsening erythema, swelling, or pain, skin lesions, etc. Patient voiced understanding.  - Will call patient if BCx return positive, NGTD    FUTURE APPOINTMENTS:       - Follow-up for annual visit or as needed    Stacie Glasgow MD  PGY - 2  Internal Medicine/Pediatrics  [facility]    ===========  STAFF NOTE:  Patient discussed with resident physician today.  We discussed carney portions of the visit and I participated in the evaluation and management of the patient today.     Kevin Samuel MD

## 2017-07-07 LAB
BACTERIA SPEC CULT: NORMAL
MICRO REPORT STATUS: NORMAL
SPECIMEN SOURCE: NORMAL

## 2017-07-11 LAB
BACTERIA SPEC CULT: NO GROWTH
MICRO REPORT STATUS: NORMAL
SPECIMEN SOURCE: NORMAL

## 2017-07-12 ENCOUNTER — TELEPHONE (OUTPATIENT)
Dept: PEDIATRICS | Facility: CLINIC | Age: 67
End: 2017-07-12

## 2017-07-12 DIAGNOSIS — I10 BENIGN ESSENTIAL HYPERTENSION: Primary | ICD-10-CM

## 2017-07-12 RX ORDER — CHLORTHALIDONE 25 MG/1
25 TABLET ORAL DAILY
Qty: 90 TABLET | Refills: 3 | Status: SHIPPED | OUTPATIENT
Start: 2017-07-12 | End: 2017-09-25

## 2017-07-12 RX ORDER — ATENOLOL 100 MG/1
100 TABLET ORAL DAILY
Qty: 90 TABLET | Refills: 3 | Status: SHIPPED | OUTPATIENT
Start: 2017-07-12 | End: 2018-06-26

## 2017-07-12 NOTE — TELEPHONE ENCOUNTER
Received a fax from the pharmacy that Tenoretic is on back order.  They are requesting two separate RXs for Atenolol and Chlorthalidone.  Discussed with Dr. High, and approval given to order two drugs separately.  Order surinder Alvares RN

## 2017-08-07 ENCOUNTER — TRANSFERRED RECORDS (OUTPATIENT)
Dept: HEALTH INFORMATION MANAGEMENT | Facility: CLINIC | Age: 67
End: 2017-08-07

## 2017-08-18 ENCOUNTER — OFFICE VISIT (OUTPATIENT)
Dept: OBGYN | Facility: CLINIC | Age: 67
End: 2017-08-18
Payer: MEDICARE

## 2017-08-18 VITALS
SYSTOLIC BLOOD PRESSURE: 124 MMHG | DIASTOLIC BLOOD PRESSURE: 66 MMHG | WEIGHT: 127 LBS | BODY MASS INDEX: 23.23 KG/M2 | HEART RATE: 76 BPM

## 2017-08-18 DIAGNOSIS — R87.619 ENDOMETRIAL CELLS ON CERVICAL PAP SMEAR INCONSISTENT WITH LAST MENSTRUAL PERIOD: ICD-10-CM

## 2017-08-18 DIAGNOSIS — R87.611 ATYPICAL SQUAMOUS CELLS CANNOT EXCLUDE HIGH GRADE SQUAMOUS INTRAEPITHELIAL LESION ON CYTOLOGIC SMEAR OF CERVIX (ASC-H): Primary | ICD-10-CM

## 2017-08-18 PROCEDURE — 58110 BX DONE W/COLPOSCOPY ADD-ON: CPT | Performed by: OBSTETRICS & GYNECOLOGY

## 2017-08-18 PROCEDURE — 57454 BX/CURETT OF CERVIX W/SCOPE: CPT | Performed by: OBSTETRICS & GYNECOLOGY

## 2017-08-18 PROCEDURE — 88305 TISSUE EXAM BY PATHOLOGIST: CPT | Performed by: OBSTETRICS & GYNECOLOGY

## 2017-08-18 NOTE — PATIENT INSTRUCTIONS
Post-colposcopy instructions:    1. You may have light bleeding for several days following the colposcopy and biopsy(ies).   Please notify the clinic if you have any heavy bleeding like a period, due to the colposcopy.    2.  You may have a solution applied to the biopsy sites to help minimize bleeding.   YOu may note a discharge that may be brownish to black in color, and my be stringy or clumping in consistency.    This is normal and will clear over a period of 1-2 weeks.    3.  We recommend to avoid intercourse and tampons for 2 weeks following the procedure.    4.   Showers are recommended for he first week, rather than tub baths.    5.  Any abdominal cramping should decrease as the day goes on.    Please notify the clinic if you have any painful cramping or prolonged cramping that does not respond to pain relievers, such a tylenol or ibuprofen.       6. Biopsy results should be available within 2 weeks via [employer], or by phone/letter.

## 2017-08-18 NOTE — MR AVS SNAPSHOT
After Visit Summary   8/18/2017    Aaliyah Hayes    MRN: [id number]           Patient Information     Date Of Birth          1950        Visit Information        Provider Department      8/18/2017 2:00 PM Lien Daniel MD [facility]        Today's Diagnoses     Atypical squamous cells cannot exclude high grade squamous intraepithelial lesion on cytologic smear of cervix (ASC-H)    -  1    Endometrial cells on cervical Pap smear inconsistent with last menstrual period          Care Instructions    Post-colposcopy instructions:    1. You may have light bleeding for several days following the colposcopy and biopsy(ies).   Please notify the clinic if you have any heavy bleeding like a period, due to the colposcopy.    2.  You may have a solution applied to the biopsy sites to help minimize bleeding.   YOu may note a discharge that may be brownish to black in color, and my be stringy or clumping in consistency.    This is normal and will clear over a period of 1-2 weeks.    3.  We recommend to avoid intercourse and tampons for 2 weeks following the procedure.    4.   Showers are recommended for he first week, rather than tub baths.    5.  Any abdominal cramping should decrease as the day goes on.    Please notify the clinic if you have any painful cramping or prolonged cramping that does not respond to pain relievers, such a tylenol or ibuprofen.       6. Biopsy results should be available within 2 weeks via mychart, or by phone/letter.            Follow-ups after your visit        Who to contact     If you have questions or need follow up information about today's clinic visit or your schedule please contact [facility] directly at [phone].  Normal or non-critical lab and imaging results will be communicated to you by MyChart, letter or phone within 4 business days after the clinic has received the results. If you do not hear from us within 7 days, please  contact the clinic through [employer] or phone. If you have a critical or abnormal lab result, we will notify you by phone as soon as possible.  Submit refill requests through [employer] or call your pharmacy and they will forward the refill request to us. Please allow 3 business days for your refill to be completed.          Additional Information About Your Visit        [employer] Information     [employer] gives you secure access to your electronic health record. If you see a primary care provider, you can also send messages to your care team and make appointments. If you have questions, please call your primary care clinic.  If you do not have a primary care provider, please call [phone] and they will assist you.        Care EveryWhere ID     This is your Care EveryWhere ID. This could be used by other organizations to access your [place] medical records  [id number]        Your Vitals Were     Pulse BMI (Body Mass Index)                76 23.23 kg/m2           Blood Pressure from Last 3 Encounters:   08/31/17 138/60   08/18/17 124/66   07/06/17 110/70    Weight from Last 3 Encounters:   08/31/17 127 lb (57.6 kg)   08/18/17 127 lb (57.6 kg)   07/06/17 131 lb (59.4 kg)              We Performed the Following     COLP CERVIX/UPPER VAGINA W BX CERVIX/ENDOCERV CURETT     ENDOMETRIAL BIOPSY W/O CERVICAL DILATION     Surgical pathology exam        Primary Care Provider Office Phone # Fax #    Jessie High [phone] [address] [place] [address]        Equal Access to Services     [facility]: Hadii aad ku hadasho Soomaali, waaxda luqadaha, qaybta kaalmada adeegyada, shaq mcguire . So [facility] [phone].    ATENCIÓN: Si habla español, tiene a coates disposición servicios gratuitos de asistencia lingüística. Llame al [phone].    We comply with applicable federal civil rights laws and [place] laws. We do not discriminate on the basis of race,  color, national origin, age, disability sex, sexual orientation or gender identity.            Thank you!     Thank you for choosing [facility]  for your care. Our goal is always to provide you with excellent care. Hearing back from our patients is one way we can continue to improve our services. Please take a few minutes to complete the written survey that you may receive in the mail after your visit with us. Thank you!             Your Updated Medication List - Protect others around you: Learn how to safely use, store and throw away your medicines at www.disposemymeds.org.          This list is accurate as of: 8/18/17 11:59 PM.  Always use your most recent med list.                   Brand Name Dispense Instructions for use Diagnosis    atenolol 100 MG tablet    TENORMIN    90 tablet    Take 1 tablet (100 mg) by mouth daily    Benign essential hypertension       atenolol-chlorthalidone 100-25 MG per tablet    TENORETIC 100    90 tablet    Take 1 tablet by mouth daily    Benign essential hypertension       chlorthalidone 25 MG tablet    HYGROTON    90 tablet    Take 1 tablet (25 mg) by mouth daily    Benign essential hypertension       ibuprofen 200 MG tablet    ADVIL/MOTRIN    120 tablet    Take 3 tablets (600 mg) by mouth every 6 hours as needed for pain        lisinopril 10 MG tablet    PRINIVIL/ZESTRIL    90 tablet    Take 1 tablet (10 mg) by mouth daily    Benign essential hypertension       magnesium 250 MG tablet     30 tablet    Take 2 tablets by mouth At Bedtime        Multi-vitamin Tabs tablet     100 tablet    Take 1 tablet by mouth every morning        OMEGA 3 500 500 MG Caps      Take 500 mg by mouth daily        omeprazole 20 MG CR capsule    priLOSEC    90 capsule    Take 1 capsule (20 mg) by mouth daily    Gastroesophageal reflux disease without esophagitis       Potassium Gluconate 2 MEQ Tabs     120 tablet    Take 2 tablets by mouth every morning        PROBIOTIC ACIDOPHILUS Caps       Take by mouth daily VSL 60        TYLENOL 500 MG tablet   Generic drug:  acetaminophen      Take 1-2 tablets (500-1,000 mg) by mouth every 6 hours as needed for mild pain        valACYclovir 1000 mg tablet    VALTREX    4 tablet    Take 2 tablets (2,000 mg) by mouth 2 times daily    Herpetic ritesh       vitamin B complex with vitamin C Tabs tablet      Take 1 tablet by mouth daily

## 2017-08-18 NOTE — NURSING NOTE
"Chief Complaint   Patient presents with     Colposcopy       Initial /66 (BP Location: Right arm, Patient Position: Sitting, Cuff Size: Adult Regular)  Pulse 76  Wt 127 lb (57.6 kg)  BMI 23.23 kg/m2 Estimated body mass index is 23.23 kg/(m^2) as calculated from the following:    Height as of 6/7/17: 5' 2\" (1.575 m).    Weight as of this encounter: 127 lb (57.6 kg).  BP completed using cuff size: regular    No obstetric history on file.    The following HM Due: NONE      The following patient reported/Care Every where data was sent to:  P ABSTRACT QUALITY INITIATIVES [[id number]]  NA     n/a             "

## 2017-08-18 NOTE — PROGRESS NOTES
67 year old female  presents for colposcopy and endometrial biopsy.   Pap smear 6/09/17: ASC-H, - HPV, endometrial cells present.  Pap smear 2/14/14: ASC-US, - HPV.    Patient notes limited range of motion in her hips.     Patient's last menstrual period was 09/19/2010.  Allergies: Pcn and Sulfa drugs      This document serves as a record of the services and decisions personally performed and made by Lien Daniel MD. It was created on her behalf by Mellissa Farris, a trained medical scribe. The creation of this document is based the provider's statements to the medical scribe.  Mellissa Farris August 18, 2017 2:18 PM       Procedure for colposcopy and biopsy has been explained to the   patient and consent obtained.    PROCEDURE:  Speculum placed in vagina and excellent visualization of cervix   achieved, cervix swabbed x 3 with acetic acid solution.    FINDINGS:  Cervix: Endocervical speculum placed, SCJ visualized 360 degrees without lesions, endocervical curettage performed, endometrial biopsy performed, specimen labelled and sent to pathology and hemostasis achieved with Monsel's solution.     Patient consented to endometrial biopsy; procedures, risks discussed and all questions answered.  Uterus sounded to 8 cm, endometrial biopsy performed without difficulty.   Patient tolerated procedure well.    Vaginal inspection: vaginal colposcopy not performed.    Vulvar colposcopy: vulvar colposcopy not performed.    Procedure Summary: Patient tolerated procedure well and colposcopy adequate.    ASSESSMENT: SELENE 1 or less.    PLAN: Specimens labelled and sent to pathology, will base further treatment on pathology findings. Treatment options discussed with patient, post biopsy instructions given to patient. Will call to discuss pathology results.      The information in this document, created by the medical scribe for me, accurately reflects the services I personally performed and the decisions made by me. I have reviewed  and approved this document for accuracy prior to leaving the patient care area.  8/18/2017 2:18 PM     Lien Daniel M.D.

## 2017-08-21 LAB — COPATH REPORT: NORMAL

## 2017-08-31 ENCOUNTER — OFFICE VISIT (OUTPATIENT)
Dept: PEDIATRICS | Facility: CLINIC | Age: 67
End: 2017-08-31
Payer: MEDICARE

## 2017-08-31 VITALS
OXYGEN SATURATION: 98 % | HEART RATE: 64 BPM | WEIGHT: 127 LBS | DIASTOLIC BLOOD PRESSURE: 60 MMHG | HEIGHT: 62 IN | SYSTOLIC BLOOD PRESSURE: 138 MMHG | BODY MASS INDEX: 23.37 KG/M2 | TEMPERATURE: 98 F

## 2017-08-31 DIAGNOSIS — M25.552 HIP PAIN, LEFT: ICD-10-CM

## 2017-08-31 DIAGNOSIS — M79.89 SWELLING OF LOWER LIMB: ICD-10-CM

## 2017-08-31 DIAGNOSIS — Z01.818 PREOP GENERAL PHYSICAL EXAM: Primary | ICD-10-CM

## 2017-08-31 DIAGNOSIS — D64.9 ANEMIA, UNSPECIFIED TYPE: ICD-10-CM

## 2017-08-31 DIAGNOSIS — I10 BENIGN ESSENTIAL HYPERTENSION: ICD-10-CM

## 2017-08-31 LAB — HGB BLD-MCNC: 10.8 G/DL (ref 11.7–15.7)

## 2017-08-31 PROCEDURE — 82728 ASSAY OF FERRITIN: CPT | Performed by: INTERNAL MEDICINE

## 2017-08-31 PROCEDURE — 36415 COLL VENOUS BLD VENIPUNCTURE: CPT | Performed by: INTERNAL MEDICINE

## 2017-08-31 PROCEDURE — 83550 IRON BINDING TEST: CPT | Performed by: INTERNAL MEDICINE

## 2017-08-31 PROCEDURE — 80048 BASIC METABOLIC PNL TOTAL CA: CPT | Performed by: INTERNAL MEDICINE

## 2017-08-31 PROCEDURE — 99215 OFFICE O/P EST HI 40 MIN: CPT | Performed by: INTERNAL MEDICINE

## 2017-08-31 PROCEDURE — 83540 ASSAY OF IRON: CPT | Performed by: INTERNAL MEDICINE

## 2017-08-31 PROCEDURE — 85018 HEMOGLOBIN: CPT | Performed by: INTERNAL MEDICINE

## 2017-08-31 RX ORDER — TRAMADOL HYDROCHLORIDE 50 MG/1
50 TABLET ORAL EVERY 8 HOURS PRN
Qty: 28 TABLET | Refills: 5 | Status: SHIPPED | OUTPATIENT
Start: 2017-08-31 | End: 2017-08-31

## 2017-08-31 RX ORDER — TRAMADOL HYDROCHLORIDE 50 MG/1
50 TABLET ORAL EVERY 8 HOURS PRN
Qty: 28 TABLET | Refills: 5 | Status: SHIPPED | OUTPATIENT
Start: 2017-08-31 | End: 2017-09-25

## 2017-08-31 NOTE — NURSING NOTE
"Chief Complaint   Patient presents with     Pre-Op Exam       Initial /60 (Cuff Size: Adult Regular)  Pulse 64  Temp 98  F (36.7  C) (Oral)  Ht 5' 2\" (1.575 m)  Wt 127 lb (57.6 kg)  SpO2 98%  BMI 23.23 kg/m2 Estimated body mass index is 23.23 kg/(m^2) as calculated from the following:    Height as of this encounter: 5' 2\" (1.575 m).    Weight as of this encounter: 127 lb (57.6 kg).  Medication Reconciliation: complete   Calli Diaz LPN    "

## 2017-08-31 NOTE — PATIENT INSTRUCTIONS
HOLD Lisinopril  the day of surgery; hold all vitamins and supplements the day of surgery as well  NO tramadol ibuprofen, motrin, aleve, naprosyn or aspirin the week before surgery  OK to take all prescription medications the day of surgery with a small sip of water      Before Your Surgery      Call your surgeon if there is any change in your health. This includes signs of a cold or flu (such as a sore throat, runny nose, cough, rash or fever).    Do not smoke, drink alcohol or take over the counter medicine (unless your surgeon or primary care doctor tells you to) for the 24 hours before and after surgery.    If you take prescribed drugs: Follow your doctor s orders about which medicines to take and which to stop until after surgery.    Eating and drinking prior to surgery: follow the instructions from your surgeon    Take a shower or bath the night before surgery. Use the soap your surgeon gave you to gently clean your skin. If you do not have soap from your surgeon, use your regular soap. Do not shave or scrub the surgery site.  Wear clean pajamas and have clean sheets on your bed.

## 2017-08-31 NOTE — PROGRESS NOTES
[facility]  [address]  [address]  [place] [address]  [phone]  Dept: [phone]    PRE-OP EVALUATION:  Today's date: 2017    Aaliyah Hayes (: 1950) presents for pre-operative evaluation assessment as requested by Dr. Yahir Ponce.  She requires evaluation and anesthesia risk assessment prior to undergoing surgery/procedure for treatment of left hip .  Proposed procedure: Total left hip replacement     Date of Surgery/ Procedure: 17  Time of Surgery/ Procedure: 1030  Hospital/Surgical Facility: [facility]   Fax number for surgical facility: [phone]  Primary Physician: Jessie High  Type of Anesthesia Anticipated: to be determined    Patient has a Health Care Directive or Living Will:  NO    Preop Questions 2017   1.  Do you have a history of heart attack, stroke, stent, bypass or surgery on an artery in the head, neck, heart or legs? No   2.  Do you ever have any pain or discomfort in your chest? No   3.  Do you have a history of  Heart Failure? No   4.   Are you troubled by shortness of breath when:  walking on a level surface, or up a slight hill, or at night? No   5.  Do you currently have a cold, bronchitis or other respiratory infection? No   6.  Do you have a cough, shortness of breath, or wheezing? No   7.  Do you sometimes get pains in the calves of your legs when you walk? No   8. Do you or anyone in your family have previous history of blood clots? No   9.  Do you or does anyone in your family have a serious bleeding problem such as prolonged bleeding following surgeries or cuts? No   10. Have you ever had problems with anemia or been told to take iron pills? No   11. Have you had any abnormal blood loss such as black, tarry or bloody stools, or abnormal vaginal bleeding? No   12. Have you ever had a blood transfusion? No   13. Have you or any of your relatives ever had problems with anesthesia? No   14. Do you have  sleep apnea, excessive snoring or daytime drowsiness? No   15. Do you have any prosthetic heart valves? No   16. Do you have prosthetic joints? YES - right hip   17. Is there any chance that you may be pregnant? No           HPI:                                                      Brief HPI related to upcoming procedure: chronic left hip pain.  Pt has been off work since august due to pain.         HYPERTENSION - Patient has longstanding history of mod-severe HTN , currently denies any symptoms referable to elevated blood pressure. Specifically denies chest pain, palpitations, dyspnea, orthopnea, PND or peripheral edema. Blood pressure readings have been in normal range. Current medication regimen is as listed below. Patient denies any side effects of medication.                                                                                                                                                                                          .  HYPERLIPIDEMIA - Patient has a long history of significant Hyperlipidemia requiring medication for treatment with recent good control. Patient reports no problems or side effects with the medication.              Does note swelling in left lower extremity for about the past week.    No change in meds.  Does feel tightness in back of both calves just today.                                                                                   MEDICAL HISTORY:                                                    Patient Active Problem List    Diagnosis Date Noted     Osteoarthritis, unspecified osteoarthritis type, unspecified site 06/16/2017     Priority: Medium     Perforated sigmoid colon (H) 01/06/2017     Priority: Medium     Benign essential hypertension 02/14/2014     Priority: Medium     Lisinopril made patient feel fidgety.       Hyperlipidemia with target LDL less than 130 02/14/2014     Priority: Medium     Diagnosis updated by automated process. Provider to review and  confirm.       Allergic rhinitis due to other allergen 02/14/2014     Priority: Medium     Advanced directives, counseling/discussion 02/14/2014     Priority: Medium     Atypical squamous cells cannot exclude high grade squamous intraepithelial lesion on cytologic smear of cervix (ASC-H) 02/14/2014     Priority: Medium     02/14/14 ASCUS, Neg HPV. Repeat co-testing in 3 yrs per ASCCP guidelines.   06/09/17 ASC-H, Neg HPV, Presence of endometrial cells. Plan for colp and possible EMB       Insomnia 02/14/2014     Priority: Low      Past Medical History:   Diagnosis Date     Atypical squamous cells cannot exclude high grade squamous intraepithelial lesion on cytologic smear of cervix (ASC-H) 06/09/2017 06/09/17 ASC-H, Neg HPV, Endometrial cells present     Facial cellulitis 11/22/2015     HTN (hypertension)      Perforated sigmoid colon (H) 1/6/2017     Past Surgical History:   Procedure Laterality Date     APPENDECTOMY  2012     COLECTOMY LEFT N/A 1/6/2017    Procedure: COLECTOMY LEFT;  Surgeon: David Gaitan MD;  Location:  OR     COLONOSCOPY N/A 1/6/2017    Procedure: COLONOSCOPY;  Surgeon: Regis Mckoy MD;  Location:  GI     LAPAROSCOPIC ASSISTED SIGMOID COLECTOMY N/A 1/6/2017    Procedure: LAPAROSCOPIC ASSISTED SIGMOID COLECTOMY;  Surgeon: David Gaitan MD;  Location:  OR     ORTHOPEDIC SURGERY       SURGICAL HISTORY OF -   09/2010    Rotator cuff repair-right shoulder     SURGICAL HISTORY OF -   12/2011    Right hip replacement     SURGICAL HISTORY OF -   2005    ACL and MCL left knee     Current Outpatient Prescriptions   Medication Sig Dispense Refill     atenolol (TENORMIN) 100 MG tablet Take 1 tablet (100 mg) by mouth daily 90 tablet 3     chlorthalidone (HYGROTON) 25 MG tablet Take 1 tablet (25 mg) by mouth daily 90 tablet 3     Omega-3 Fatty Acids (OMEGA 3 500) 500 MG CAPS Take 500 mg by mouth daily       Lactobacillus (PROBIOTIC ACIDOPHILUS) CAPS VSL 60        atenolol-chlorthalidone (TENORETIC 100) 100-25 MG per tablet Take 1 tablet by mouth daily (Patient not taking: Reported on 8/18/2017) 90 tablet 3     traMADol (ULTRAM) 50 MG tablet Take 1 tablet (50 mg) by mouth every 8 hours as needed 28 tablet 5     valACYclovir (VALTREX) 1000 mg tablet Take 2 tablets (2,000 mg) by mouth 2 times daily 4 tablet 11     omeprazole (PRILOSEC) 20 MG CR capsule Take 1 capsule (20 mg) by mouth daily 90 capsule 1     lisinopril (PRINIVIL/ZESTRIL) 10 MG tablet Take 1 tablet (10 mg) by mouth daily 90 tablet 3     simvastatin (ZOCOR) 20 MG tablet Take 20 mg by mouth At Bedtime       acetaminophen (TYLENOL) 500 MG tablet Take 1-2 tablets (500-1,000 mg) by mouth every 6 hours as needed for mild pain       vitamin  B complex with vitamin C (VITAMIN  B COMPLEX) TABS Take 1 tablet by mouth daily  0     ibuprofen (ADVIL,MOTRIN) 200 MG tablet Take 3 tablets (600 mg) by mouth every 6 hours as needed for pain 120 tablet      magnesium 250 MG tablet Take 2 tablets by mouth At Bedtime 30 tablet      multivitamin, therapeutic with minerals (MULTI-VITAMIN) TABS Take 1 tablet by mouth every morning  100 tablet 3     Potassium Gluconate 2 MEQ TABS Take 2 tablets by mouth every morning  120 tablet      OTC products: None, except as noted above    Allergies   Allergen Reactions     Ciprofloxacin      Facial swelling, tingling on her upper lip, throat slightly closed, racing heart     Sulfa Drugs      Eye reaction to sulfa-based eye drop      Latex Allergy: NO    Social History   Substance Use Topics     Smoking status: Never Smoker     Smokeless tobacco: Never Used     Alcohol use 0.0 oz/week     0 Standard drinks or equivalent per week      Comment: 1 glass of wine per night     History   Drug Use No       REVIEW OF SYSTEMS:                                                    C: NEGATIVE for fever, chills, change in weight  I: NEGATIVE for worrisome rashes, moles or lesions  E: NEGATIVE for vision changes or  "irritation  E/M: NEGATIVE for ear, mouth and throat problems  R: NEGATIVE for significant cough or SOB  B: NEGATIVE for masses, tenderness or discharge  CV: NEGATIVE for chest pain, palpitations or peripheral edema  GI: NEGATIVE for nausea, abdominal pain, heartburn, or change in bowel habits  : NEGATIVE for frequency, dysuria, or hematuria  M: NEGATIVE for significant arthralgias or myalgia  N: NEGATIVE for weakness, dizziness or paresthesias  E: NEGATIVE for temperature intolerance, skin/hair changes  H: NEGATIVE for bleeding problems  P: NEGATIVE for changes in mood or affect    EXAM:                                                    /60 (Cuff Size: Adult Regular)  Pulse 64  Temp 98  F (36.7  C) (Oral)  Ht 5' 2\" (1.575 m)  Wt 127 lb (57.6 kg)  SpO2 98%  BMI 23.23 kg/m2    GENERAL APPEARANCE:  alert and no distress     EYES: EOMI, PERRL     HENT: ear canals and TM's normal and nose and mouth without ulcers or lesions     NECK: no adenopathy, no asymmetry, masses, or scars and thyroid normal to palpation     RESP: lungs clear to auscultation - no rales, rhonchi or wheezes     CV: regular rates and rhythm, normal S1 S2, no S3 or S4 and no murmur, click or rub     ABDOMEN:  soft, nontender, no HSM or masses and bowel sounds normal     MS: 1+ edema L foot and ankle.  No edema RLE.  Walks with crutches, significant difficulty getting on and off exam table.       SKIN: no suspicious lesions or rashes     NEURO: Normal strength and tone, sensory exam grossly normal, mentation intact and speech normal     PSYCH: mentation appears normal. and affect normal/bright     LYMPHATICS: No axillary, cervical, or supraclavicular nodes    DIAGNOSTICS:                                                    EKG: Not indicated due to non-vascular surgery and last ekg on 1/6/17 (within 30 days for CAD history or last year for cardiac risk factors)    Recent Labs   Lab Test  07/05/17   1808  06/09/17   0919  01/09/17   0608  " 01/08/17   0643  01/07/17   0658   02/24/15   0952   HGB  11.3*   --    --   9.9*  9.9*   < >   --    PLT  288   --   253  216  233   < >   --    NA   --   133   --    --   135   < >  136   POTASSIUM   --   4.6   --    --   3.4   < >  3.9   CR   --   0.82   --    --   0.72   < >  0.70   A1C   --    --    --    --    --    --   5.5    < > = values in this interval not displayed.        IMPRESSION:                                                    Reason for surgery/procedure: chronic left hip arthritis and pain   Diagnosis/reason for consult:  Pre op     The proposed surgical procedure is considered INTERMEDIATE risk.    REVISED CARDIAC RISK INDEX  The patient has the following serious cardiovascular risks for perioperative complications such as (MI, PE, VFib and 3  AV Block):  No serious cardiac risks  INTERPRETATION: 0 risks: Class I (very low risk - 0.4% complication rate)    The patient has the following additional risks for perioperative complications:  No identified additional risks      ICD-10-CM    1. Preop general physical exam Z01.818    2. Hip pain, left M25.552 traMADol (ULTRAM) 50 MG tablet   3. Benign essential hypertension I10        RECOMMENDATIONS:                                                      Pt with new swelling in left lower extremity and limited mobility.  Will do US to r/o DVT, although exam not suggestive of DVT.  If pt has DVT may need to delay surgery.      --Consult hospital rounder / IM to assist post-op medical management  -check hgb today due to risk of anemia from high blood loss surgery; also check bmp today due to risk of potassium and renal function abnormalities due to htn and diuretic use     --Patient is to take all scheduled medications on the day of surgery EXCEPT for modifications listed below.    ACE Inhibitor or Angiotensin Receptor Blocker (ARB) Use  Ace inhibitor or Angiotensin Receptor Blocker (ARB) and should HOLD this medication for the 24 hours prior to  surgery.        APPROVAL GIVEN to proceed with proposed procedure, without further diagnostic evaluation       Signed Electronically by: Jessie High MD    Copy of this evaluation report is provided to requesting physician.    [facility] Preop Guidelines

## 2017-08-31 NOTE — MR AVS SNAPSHOT
After Visit Summary   8/31/2017    Aaliyah Hayes    MRN: [id number]           Patient Information     Date Of Birth          1950        Visit Information        Provider Department      8/31/2017 9:40 AM Jessie High MD [facility]        Today's Diagnoses     Preop general physical exam    -  1    Hip pain, left        Benign essential hypertension        Swelling of lower limb          Care Instructions    HOLD Lisinopril  the day of surgery; hold all vitamins and supplements the day of surgery as well  NO tramadol ibuprofen, motrin, aleve, naprosyn or aspirin the week before surgery  OK to take all prescription medications the day of surgery with a small sip of water      Before Your Surgery      Call your surgeon if there is any change in your health. This includes signs of a cold or flu (such as a sore throat, runny nose, cough, rash or fever).    Do not smoke, drink alcohol or take over the counter medicine (unless your surgeon or primary care doctor tells you to) for the 24 hours before and after surgery.    If you take prescribed drugs: Follow your doctor s orders about which medicines to take and which to stop until after surgery.    Eating and drinking prior to surgery: follow the instructions from your surgeon    Take a shower or bath the night before surgery. Use the soap your surgeon gave you to gently clean your skin. If you do not have soap from your surgeon, use your regular soap. Do not shave or scrub the surgery site.  Wear clean pajamas and have clean sheets on your bed.           Follow-ups after your visit        Future tests that were ordered for you today     Open Future Orders        Priority Expected Expires Ordered    US Lower Extremity Venous Duplex Left Routine  8/31/2018 8/31/2017            Who to contact     If you have questions or need follow up information about today's clinic visit or your schedule please contact [facility]  "ALEX directly at [phone].  Normal or non-critical lab and imaging results will be communicated to you by MyChart, letter or phone within 4 business days after the clinic has received the results. If you do not hear from us within 7 days, please contact the clinic through [employer] or phone. If you have a critical or abnormal lab result, we will notify you by phone as soon as possible.  Submit refill requests through [employer] or call your pharmacy and they will forward the refill request to us. Please allow 3 business days for your refill to be completed.          Additional Information About Your Visit        [employer] Information     [employer] gives you secure access to your electronic health record. If you see a primary care provider, you can also send messages to your care team and make appointments. If you have questions, please call your primary care clinic.  If you do not have a primary care provider, please call [phone] and they will assist you.        Care EveryWhere ID     This is your Care EveryWhere ID. This could be used by other organizations to access your [facility] medical records  [id number]        Your Vitals Were     Pulse Temperature Height Pulse Oximetry BMI (Body Mass Index)       64 98  F (36.7  C) (Oral) 5' 2\" (1.575 m) 98% 23.23 kg/m2        Blood Pressure from Last 3 Encounters:   08/31/17 138/60   08/18/17 124/66   07/06/17 110/70    Weight from Last 3 Encounters:   08/31/17 127 lb (57.6 kg)   08/18/17 127 lb (57.6 kg)   07/06/17 131 lb (59.4 kg)              We Performed the Following     Basic metabolic panel     Hemoglobin          Where to get your medicines      Some of these will need a paper prescription and others can be bought over the counter.  Ask your nurse if you have questions.     Bring a paper prescription for each of these medications     traMADol 50 MG tablet          Primary Care Provider Office Phone # Fax #    Jessie High [phone] "       [address] [place] [address]        Equal Access to Services     [facility] : Hadii aad ku hadjaquangabriella Shaunajosi, waseamusda tyler, qayanta marlenethaosweta jack, shaq mayenalexyjon traore. So [facility] [phone].    ATENCIÓN: Si habla español, tiene a coates disposición servicios gratuitos de asistencia lingüística. [facility] [phone].    We comply with applicable federal civil rights laws and [place] laws. We do not discriminate on the basis of race, color, national origin, age, disability sex, sexual orientation or gender identity.            Thank you!     [facility] you for choosing [facility]  for your care. Our goal is always to provide you with excellent care. Hearing back from our patients is one way we can continue to improve our services. Please take a few minutes to complete the written survey that you may receive in the mail after your visit with us. Thank you!             Your Updated Medication List - Protect others around you: Learn how to safely use, store and throw away your medicines at www.disposemymeds.org.          This list is accurate as of: 8/31/17 10:41 AM.  Always use your most recent med list.                   Brand Name Dispense Instructions for use Diagnosis    atenolol 100 MG tablet    TENORMIN    90 tablet    Take 1 tablet (100 mg) by mouth daily    Benign essential hypertension       atenolol-chlorthalidone 100-25 MG per tablet    TENORETIC 100    90 tablet    Take 1 tablet by mouth daily    Benign essential hypertension       chlorthalidone 25 MG tablet    HYGROTON    90 tablet    Take 1 tablet (25 mg) by mouth daily    Benign essential hypertension       ibuprofen 200 MG tablet    ADVIL/MOTRIN    120 tablet    Take 3 tablets (600 mg) by mouth every 6 hours as needed for pain        lisinopril 10 MG tablet    PRINIVIL/ZESTRIL    90 tablet    Take 1 tablet (10 mg) by mouth daily    Benign essential hypertension       magnesium 250 MG tablet     30  tablet    Take 2 tablets by mouth At Bedtime        Multi-vitamin Tabs tablet     100 tablet    Take 1 tablet by mouth every morning        OMEGA 3 500 500 MG Caps      Take 500 mg by mouth daily        omeprazole 20 MG CR capsule    priLOSEC    90 capsule    Take 1 capsule (20 mg) by mouth daily    Gastroesophageal reflux disease without esophagitis       Potassium Gluconate 2 MEQ Tabs     120 tablet    Take 2 tablets by mouth every morning        PROBIOTIC ACIDOPHILUS Caps      Take by mouth daily VSL 60        simvastatin 20 MG tablet    ZOCOR     Take 20 mg by mouth At Bedtime        traMADol 50 MG tablet    ULTRAM    28 tablet    Take 1 tablet (50 mg) by mouth every 8 hours as needed    Hip pain, left       TYLENOL 500 MG tablet   Generic drug:  acetaminophen      Take 1-2 tablets (500-1,000 mg) by mouth every 6 hours as needed for mild pain        valACYclovir 1000 mg tablet    VALTREX    4 tablet    Take 2 tablets (2,000 mg) by mouth 2 times daily    Herpetic ritesh       vitamin B complex with vitamin C Tabs tablet      Take 1 tablet by mouth daily

## 2017-09-01 DIAGNOSIS — D64.9 ANEMIA, UNSPECIFIED TYPE: ICD-10-CM

## 2017-09-01 LAB
ANION GAP SERPL CALCULATED.3IONS-SCNC: 8 MMOL/L (ref 3–14)
BUN SERPL-MCNC: 16 MG/DL (ref 7–30)
CALCIUM SERPL-MCNC: 9.4 MG/DL (ref 8.5–10.1)
CHLORIDE SERPL-SCNC: 92 MMOL/L (ref 94–109)
CO2 SERPL-SCNC: 29 MMOL/L (ref 20–32)
CREAT SERPL-MCNC: 0.86 MG/DL (ref 0.52–1.04)
FERRITIN SERPL-MCNC: 92 NG/ML (ref 8–252)
GFR SERPL CREATININE-BSD FRML MDRD: 66 ML/MIN/1.7M2
GLUCOSE SERPL-MCNC: 83 MG/DL (ref 70–99)
IRON SATN MFR SERPL: 23 % (ref 15–46)
IRON SERPL-MCNC: 72 UG/DL (ref 35–180)
POTASSIUM SERPL-SCNC: 4.6 MMOL/L (ref 3.4–5.3)
SODIUM SERPL-SCNC: 129 MMOL/L (ref 133–144)
TIBC SERPL-MCNC: 313 UG/DL (ref 240–430)

## 2017-09-01 PROCEDURE — 82274 ASSAY TEST FOR BLOOD FECAL: CPT | Performed by: INTERNAL MEDICINE

## 2017-09-03 LAB — HEMOCCULT STL QL IA: POSITIVE

## 2017-09-06 ENCOUNTER — HOSPITAL ENCOUNTER (OUTPATIENT)
Dept: ULTRASOUND IMAGING | Facility: CLINIC | Age: 67
Discharge: HOME | End: 2017-09-06
Attending: INTERNAL MEDICINE | Admitting: INTERNAL MEDICINE
Payer: MEDICARE

## 2017-09-06 DIAGNOSIS — M79.89 SWELLING OF LOWER LIMB: ICD-10-CM

## 2017-09-06 PROCEDURE — 93971 EXTREMITY STUDY: CPT | Mod: LT

## 2017-09-07 DIAGNOSIS — E78.5 HYPERLIPIDEMIA WITH TARGET LDL LESS THAN 130: Primary | ICD-10-CM

## 2017-09-07 NOTE — TELEPHONE ENCOUNTER
simvastatin (ZOCOR) 20 MG tablet  Last Written Prescription Date:  ?  Last Fill Quantity: ?,   # refills: ?  Last Office Visit with [facility], [facility] or [facility] prescribing provider: 8/31/17  Future Office visit:       Routing refill request to provider for review/approval because:  Medication is reported/historical

## 2017-09-11 RX ORDER — SIMVASTATIN 20 MG
20 TABLET ORAL AT BEDTIME
Qty: 30 TABLET | Refills: 8 | Status: SHIPPED | OUTPATIENT
Start: 2017-09-11 | End: 2018-06-26

## 2017-09-11 NOTE — TELEPHONE ENCOUNTER
It appears this medication was DC'd at the time of a surgical procedure- had been previously prescribed by Dr. High. Has since reported as taking.     LDL current and at goal. Prescription approved per [facility] Refill Protocol.

## 2017-09-21 ENCOUNTER — TRANSFERRED RECORDS (OUTPATIENT)
Dept: HEALTH INFORMATION MANAGEMENT | Facility: CLINIC | Age: 67
End: 2017-09-21

## 2017-09-25 ENCOUNTER — NURSING HOME VISIT (OUTPATIENT)
Dept: GERIATRICS | Facility: CLINIC | Age: 67
End: 2017-09-25
Payer: MEDICARE

## 2017-09-25 VITALS
SYSTOLIC BLOOD PRESSURE: 112 MMHG | DIASTOLIC BLOOD PRESSURE: 66 MMHG | RESPIRATION RATE: 16 BRPM | WEIGHT: 129.8 LBS | TEMPERATURE: 97 F | BODY MASS INDEX: 23.89 KG/M2 | HEIGHT: 62 IN | HEART RATE: 76 BPM | OXYGEN SATURATION: 100 %

## 2017-09-25 VITALS
BODY MASS INDEX: 23.89 KG/M2 | HEART RATE: 81 BPM | RESPIRATION RATE: 20 BRPM | HEIGHT: 62 IN | TEMPERATURE: 98.2 F | WEIGHT: 129.8 LBS | OXYGEN SATURATION: 98 % | SYSTOLIC BLOOD PRESSURE: 112 MMHG | DIASTOLIC BLOOD PRESSURE: 67 MMHG

## 2017-09-25 DIAGNOSIS — D62 ACUTE POSTHEMORRHAGIC ANEMIA: ICD-10-CM

## 2017-09-25 DIAGNOSIS — M15.0 PRIMARY OSTEOARTHRITIS INVOLVING MULTIPLE JOINTS: ICD-10-CM

## 2017-09-25 DIAGNOSIS — I10 BENIGN ESSENTIAL HYPERTENSION: ICD-10-CM

## 2017-09-25 DIAGNOSIS — Z96.642 HISTORY OF TOTAL LEFT HIP ARTHROPLASTY: Primary | ICD-10-CM

## 2017-09-25 PROBLEM — M15.9 OSTEOARTHRITIS OF MULTIPLE JOINTS: Status: ACTIVE | Noted: 2017-06-16

## 2017-09-25 PROCEDURE — 99310 SBSQ NF CARE HIGH MDM 45: CPT | Performed by: NURSE PRACTITIONER

## 2017-09-25 RX ORDER — HYDROCODONE BITARTRATE AND ACETAMINOPHEN 5; 325 MG/1; MG/1
1-2 TABLET ORAL EVERY 6 HOURS PRN
COMMUNITY
End: 2017-10-11

## 2017-09-25 RX ORDER — AMOXICILLIN 250 MG
2 CAPSULE ORAL 2 TIMES DAILY
COMMUNITY
End: 2017-10-11

## 2017-09-25 RX ORDER — IBUPROFEN 200 MG
1250 CAPSULE ORAL DAILY
COMMUNITY

## 2017-09-25 NOTE — PROGRESS NOTES
[facility]  PRIMARY CARE PROVIDER AND CLINIC:  Jessie High [address]  / [place] [address]  Chief Complaint   Patient presents with     Hospital F/U       HPI:    Aaliyah Hayes is a 67 year old  (1950),admitted to the [facility] TCU from [facility] .  Hospital stay 09/21/2017 through 09/24/2017.  Admitted to this facility for  rehab, medical management and nursing care.  HPI information obtained from: facility chart records, facility staff, patient report, [facility] chart review and Care Everywhere [facility] chart review.  Current issues are:      1. Status post total hip replacement, left - planned replacement, no complications, pain contol adequate, tolerating rehab   2. Orthostatic hypotension- had episode today after showering and beginning rehab. Resolved with resting, no CP or SOB    3. Primary osteoarthritis involving multiple joints - 2nd joint replacement, other joints involved also,    4. Benign essential hypertension - tx, controlled   5. Advanced directives, counseling/discussion - full code, tx at this time. No AD on file. DaughterVerónica emergency contact       CODE STATUS/ADVANCE DIRECTIVES DISCUSSION:   CPR/Full code   Patient's living condition: lives alone    ALLERGIES:Ciprofloxacin; Oxycodone; and Sulfa drugs  PAST MEDICAL HISTORY:  has a past medical history of Atypical squamous cells cannot exclude high grade squamous intraepithelial lesion on cytologic smear of cervix (ASC-H) (06/09/2017); Facial cellulitis (11/22/2015); HTN (hypertension); and Perforated sigmoid colon (H) (1/6/2017).  PAST SURGICAL HISTORY:  has a past surgical history that includes surgical history of - (09/2010); surgical history of - (12/2011); surgical history of - (2005); appendectomy (2012); orthopedic surgery; Colonoscopy (N/A, 1/6/2017); Laparoscopic Assisted Sigmoid Colectomy (N/A, 1/6/2017); and Colectomy left (N/A,  1/6/2017).  FAMILY HISTORY: family history includes Alcohol/Drug in her father; C.A.D. in her paternal grandmother; DIABETES (age of onset: 84) in her mother; Hypertension in her mother. There is no history of Colon Cancer.  SOCIAL HISTORY:  reports that she has never smoked. She has never used smokeless tobacco. She reports that she drinks alcohol. She reports that she does not use illicit drugs.    Post Discharge Medication Reconciliation Status: discharge medications reconciled, continue medications without change.  Current Outpatient Prescriptions   Medication Sig Dispense Refill     ASPIRIN EC PO Take 325 mg by mouth 2 times daily (with meals)       calcium carbonate (OSCAL 500) 1250 (500 CA) MG TABS tablet Take 1,250 mg by mouth daily       DIPHENHYDRAMINE HCL PO Take 25 mg by mouth nightly as needed       HYDROcodone-acetaminophen (NORCO) 5-325 MG per tablet Take 1-2 tablets by mouth every 6 hours as needed       LISINOPRIL PO Take 5 mg by mouth daily       potassium gluconate 2.5 MEQ TABS Take 5 mEq by mouth daily       senna-docusate (SENOKOT-S;PERICOLACE) 8.6-50 MG per tablet Take 2 tablets by mouth 2 times daily       simvastatin (ZOCOR) 20 MG tablet Take 1 tablet (20 mg) by mouth At Bedtime 30 tablet 8     atenolol (TENORMIN) 100 MG tablet Take 1 tablet (100 mg) by mouth daily 90 tablet 3     Omega-3 Fatty Acids (OMEGA 3 500) 500 MG CAPS Take 500 mg by mouth daily       omeprazole (PRILOSEC) 20 MG CR capsule Take 1 capsule (20 mg) by mouth daily 90 capsule 1     vitamin  B complex with vitamin C (VITAMIN  B COMPLEX) TABS Take 1 tablet by mouth daily  0     magnesium 250 MG tablet Take 2 tablets by mouth At Bedtime 30 tablet      multivitamin, therapeutic with minerals (MULTI-VITAMIN) TABS Take 1 tablet by mouth every morning  100 tablet 3     valACYclovir (VALTREX) 1000 mg tablet Take 2 tablets (2,000 mg) by mouth 2 times daily As needed for herpetic ritesh 4 tablet 0       ROS:  10 point ROS of systems  "including Constitutional, Eyes, Respiratory, Cardiovascular, Gastroenterology, Genitourinary, Integumentary, Muscularskeletal, Psychiatric were all negative except for pertinent positives noted in my HPI.    Exam:  /66  Pulse 76  Temp 97  F (36.1  C)  Resp 16  Ht 5' 2\" (1.575 m)  Wt 129 lb 12.8 oz (58.9 kg)  SpO2 100%  BMI 23.74 kg/m2  GENERAL APPEARANCE:  Alert, in no distress, cooperative  ENT:  Mouth and posterior oropharynx normal, moist mucous membranes  EYES:  EOM, conjunctivae, lids, pupils and irises normal  NECK:  No adenopathy,masses or thyromegaly  RESP:  respiratory effort and palpation of chest normal, lungs clear to auscultation , no respiratory distress  CV:  Palpation and auscultation of heart done , regular rate and rhythm, no murmur, rub, or gallop, no edema  ABDOMEN:  normal bowel sounds, soft, nontender, no hepatosplenomegaly or other masses  M/S:   Gait and station abnormal walker use due to Lt KRISTA  SKIN:  wound healing well, no signs of infection Lt hip  NEURO:   no focal neuro deficits  PSYCH:  oriented X 3, normal insight, judgement and memory    Lab/Diagnostic data:  CBC RESULTS:   Recent Labs   Lab Test  08/31/17   1047  07/05/17   1808  01/09/17   0608  01/08/17   0643   WBC   --   7.3   --   11.9*   RBC   --   3.27*   --   2.83*   HGB  10.8*  11.3*   --   9.9*   HCT   --   32.2*   --   28.2*   MCV   --   99   --   100   MCH   --   34.6*   --   35.0*   MCHC   --   35.1   --   35.1   RDW   --   11.1   --   13.0   PLT   --   288  253  216       Last Basic Metabolic Panel:  Recent Labs   Lab Test  08/31/17   1047  06/09/17   0919   NA  129*  133   POTASSIUM  4.6  4.6   CHLORIDE  92*  97   DARIUS  9.4  9.6   CO2  29  28   BUN  16  22   CR  0.86  0.82   GLC  83  94       Liver Function Studies -   Recent Labs   Lab Test  06/09/17   0919  11/11/16   1039   PROTTOTAL  7.4  7.6   ALBUMIN  4.2  4.3   BILITOTAL  0.5  0.4   ALKPHOS  71  87   AST  20  40   ALT  17  42 "       ASSESSMENT/PLAN:  1. Status post total hip replacement, left - planned replacement, no complications, pain contol adequate, tolerating rehab- continue, discharge planning of d/c by end of week   2. Orthostatic hypotension- had episode today after showering and beginning rehab. Resolved with resting, no CP or SOB , monitor, push fluids   3. Primary osteoarthritis involving multiple joints - 2nd joint replacement, other joints involved also,    4. Benign essential hypertension - tx, controlled   5. Advanced directives, counseling/discussion - full code, tx at this time. No AD on file. DaughterVerónica emergency contact               Electronically signed by:  Gorge Dumas MD

## 2017-09-25 NOTE — PROGRESS NOTES
[facility]  PRIMARY CARE PROVIDER AND CLINIC:  [facility] [address]  / [place] [address]  Chief Complaint   Patient presents with     Hospital F/U       HPI:    Aaliyah Hayes is a 67 year old  (1950),admitted to the [facility] TCU from [facility] .  Hospital stay 09/21/2017 through 09/24/2017.  Admitted to this facility for  rehab, medical management and nursing care.  HPI information obtained from: facility chart records, facility staff, patient report, [facility] chart review and Care Everywhere [facility] chart review.  Current issues are:      History of total left hip arthroplasty  Primary osteoarthritis involving multiple joints  Patient transferred to TCU following hospitalization due to left KRISTA with tenotomy on 9/21. Patient has severe DJD. There were no intraoperative or post operative complications except for low BP. She reports less pain now than prior to surgery. She is up and walking. She is taking ASA 325mg BID for DVT prophylaxis and has norco available for pain. She lives alone in two story home and plans to return there. She also plans to return to work in 4 weeks.     Benign essential hypertension  BP's: 112/67, 113/67, 126/58  PTA meds restarted   Acute posthemorrhagic anemia  hgb dropped from 11 pre operatively to 8.7 post operatively. She is not on iron supp due to recent perforated colon. She plans to follow up with colon rectal surgery upon recovery from KRISTA.          HR: 77-90    CODE STATUS/ADVANCE DIRECTIVES DISCUSSION:   CPR/Full code   Patient's living condition: lives alone    ALLERGIES:Ciprofloxacin and Sulfa drugs  PAST MEDICAL HISTORY:  has a past medical history of Atypical squamous cells cannot exclude high grade squamous intraepithelial lesion on cytologic smear of cervix (ASC-H) (06/09/2017); Facial cellulitis (11/22/2015); HTN (hypertension); and Perforated sigmoid colon (H) (1/6/2017).  PAST  SURGICAL HISTORY:  has a past surgical history that includes surgical history of - (09/2010); surgical history of - (12/2011); surgical history of - (2005); appendectomy (2012); orthopedic surgery; Colonoscopy (N/A, 1/6/2017); Laparoscopic Assisted Sigmoid Colectomy (N/A, 1/6/2017); and Colectomy left (N/A, 1/6/2017).  FAMILY HISTORY: family history includes Alcohol/Drug in her father; C.A.D. in her paternal grandmother; DIABETES (age of onset: 84) in her mother; Hypertension in her mother. There is no history of Colon Cancer.  SOCIAL HISTORY:  reports that she has never smoked. She has never used smokeless tobacco. She reports that she drinks alcohol. She reports that she does not use illicit drugs.    Post Discharge Medication Reconciliation Status: discharge medications reconciled, continue medications without change.  Current Outpatient Prescriptions   Medication Sig Dispense Refill     ASPIRIN EC PO Take 325 mg by mouth 2 times daily (with meals)       calcium carbonate (OSCAL 500) 1250 (500 CA) MG TABS tablet Take 1,250 mg by mouth daily       DIPHENHYDRAMINE HCL PO Take 25 mg by mouth nightly as needed       HYDROcodone-acetaminophen (NORCO) 5-325 MG per tablet Take 1-2 tablets by mouth every 4 hours        LISINOPRIL PO Take 5 mg by mouth daily       potassium gluconate 2.5 MEQ TABS Take 5 mEq by mouth daily       senna-docusate (SENOKOT-S;PERICOLACE) 8.6-50 MG per tablet Take 2 tablets by mouth 2 times daily       simvastatin (ZOCOR) 20 MG tablet Take 1 tablet (20 mg) by mouth At Bedtime 30 tablet 8     atenolol (TENORMIN) 100 MG tablet Take 1 tablet (100 mg) by mouth daily 90 tablet 3     Omega-3 Fatty Acids (OMEGA 3 500) 500 MG CAPS Take 500 mg by mouth daily       omeprazole (PRILOSEC) 20 MG CR capsule Take 1 capsule (20 mg) by mouth daily 90 capsule 1     vitamin  B complex with vitamin C (VITAMIN  B COMPLEX) TABS Take 1 tablet by mouth daily  0     magnesium 250 MG tablet Take 2 tablets by mouth At  "Bedtime 30 tablet      multivitamin, therapeutic with minerals (MULTI-VITAMIN) TABS Take 1 tablet by mouth every morning  100 tablet 3       ROS:  4 point ROS including Respiratory, CV, GI and , other than that noted in the HPI,  is negative    Exam:  /67  Pulse 81  Temp 98.2  F (36.8  C)  Resp 20  Ht 5' 2\" (1.575 m)  Wt 129 lb 12.8 oz (58.9 kg)  SpO2 98%  BMI 23.74 kg/m2  GENERAL APPEARANCE:  Alert, in no distress  ENT:  Mouth and posterior oropharynx normal, moist mucous membranes, hearing acuity adequate   EYES:  EOM, conjunctivae, lids, pupils and irises normal  RESP:  respiratory effort and palpation of chest normal, no respiratory distress, Lung sounds clear  CV:  Palpation and auscultation of heart done , rate and rhythm reg, no murmur, no rub or gallop, Edema none  ABDOMEN:  normal bowel sounds, soft, nontender, no hepatosplenomegaly or other masses  M/S:   Gait and station steady, Digits and nails no   SKIN:  Inspection/Palpation of skin and subcutaneous tissue aquacel on left hip. Left groin with clean dressing. No surrounding redness  NEURO: 2-12 in normal limits and at patient's baseline  PSYCH:  insight and judgement, memory intact , affect and mood normal    Lab/Diagnostic data:  CBC RESULTS:   Recent Labs   Lab Test  08/31/17   1047  07/05/17   1808  01/09/17   0608  01/08/17   0643   WBC   --   7.3   --   11.9*   RBC   --   3.27*   --   2.83*   HGB  10.8*  11.3*   --   9.9*   HCT   --   32.2*   --   28.2*   MCV   --   99   --   100   MCH   --   34.6*   --   35.0*   MCHC   --   35.1   --   35.1   RDW   --   11.1   --   13.0   PLT   --   288  253  216       Last Basic Metabolic Panel:  Recent Labs   Lab Test  08/31/17   1047  06/09/17   0919   NA  129*  133   POTASSIUM  4.6  4.6   CHLORIDE  92*  97   DARIUS  9.4  9.6   CO2  29  28   BUN  16  22   CR  0.86  0.82   GLC  83  94       Liver Function Studies -   Recent Labs   Lab Test  06/09/17   0919  11/11/16   1039   PROTTOTAL  7.4  7.6 "   ALBUMIN  4.2  4.3   BILITOTAL  0.5  0.4   ALKPHOS  71  87   AST  20  40   ALT  17  42       ASSESSMENT/PLAN:  (Z96.642) History of total left hip arthroplasty  (primary encounter diagnosis)  (M15.0) Primary osteoarthritis involving multiple joints  Comment: patient transferred to TCU from hospital following KRISTA of left hip due to severe DJD. Hospital stay was uneventful. DVT prophylaxis ASA 325mg BID.  Patient reports less pain now than prior to surgery.   Plan: physical therapy, norco.     (I10) Benign essential hypertension  Comment: low BPS post operatively.   Plan: monitor, BMP 9/27    (D62) Acute posthemorrhagic anemia  Comment: due to surgery and GI bleeding.   Plan: hgb check on 9/27        Total time spent with patient visit at the skilled nursing facility was 45 min including patient visit and review of past records. Greater than 50% of total time spent with counseling and coordinating care due to review of chart and discussion of GI issues    Electronically signed by:  PANKAJ Dye CNP    [phone]

## 2017-09-26 ENCOUNTER — NURSING HOME VISIT (OUTPATIENT)
Dept: GERIATRICS | Facility: CLINIC | Age: 67
End: 2017-09-26
Payer: MEDICARE

## 2017-09-26 DIAGNOSIS — I95.1 ORTHOSTATIC HYPOTENSION: ICD-10-CM

## 2017-09-26 DIAGNOSIS — Z71.89 ADVANCED DIRECTIVES, COUNSELING/DISCUSSION: ICD-10-CM

## 2017-09-26 DIAGNOSIS — Z96.642 STATUS POST TOTAL HIP REPLACEMENT, LEFT: Primary | ICD-10-CM

## 2017-09-26 DIAGNOSIS — M15.0 PRIMARY OSTEOARTHRITIS INVOLVING MULTIPLE JOINTS: ICD-10-CM

## 2017-09-26 DIAGNOSIS — I10 BENIGN ESSENTIAL HYPERTENSION: ICD-10-CM

## 2017-09-26 PROCEDURE — 99305 1ST NF CARE MODERATE MDM 35: CPT | Performed by: FAMILY MEDICINE

## 2017-09-27 ENCOUNTER — DISCHARGE SUMMARY NURSING HOME (OUTPATIENT)
Dept: GERIATRICS | Facility: CLINIC | Age: 67
End: 2017-09-27
Payer: MEDICARE

## 2017-09-27 ENCOUNTER — TRANSFERRED RECORDS (OUTPATIENT)
Dept: HEALTH INFORMATION MANAGEMENT | Facility: CLINIC | Age: 67
End: 2017-09-27

## 2017-09-27 VITALS
TEMPERATURE: 98.3 F | RESPIRATION RATE: 16 BRPM | WEIGHT: 126 LBS | SYSTOLIC BLOOD PRESSURE: 105 MMHG | HEART RATE: 86 BPM | OXYGEN SATURATION: 99 % | HEIGHT: 62 IN | BODY MASS INDEX: 23.19 KG/M2 | DIASTOLIC BLOOD PRESSURE: 69 MMHG

## 2017-09-27 DIAGNOSIS — M15.0 PRIMARY OSTEOARTHRITIS INVOLVING MULTIPLE JOINTS: ICD-10-CM

## 2017-09-27 DIAGNOSIS — B00.89 HERPETIC WHITLOW: ICD-10-CM

## 2017-09-27 DIAGNOSIS — D62 ACUTE POSTHEMORRHAGIC ANEMIA: ICD-10-CM

## 2017-09-27 DIAGNOSIS — Z96.642 HISTORY OF TOTAL LEFT HIP ARTHROPLASTY: Primary | ICD-10-CM

## 2017-09-27 DIAGNOSIS — I10 BENIGN ESSENTIAL HYPERTENSION: ICD-10-CM

## 2017-09-27 LAB
ANION GAP SERPL CALCULATED.3IONS-SCNC: 9 MMOL/L (ref 3–14)
BUN SERPL-MCNC: 13 MG/DL (ref 7–30)
CALCIUM SERPL-MCNC: 9.6 MG/DL (ref 8.5–10.1)
CHLORIDE SERPLBLD-SCNC: 103 MMOL/L (ref 94–109)
CO2 SERPL-SCNC: 26 MMOL/L (ref 20–32)
CREAT SERPL-MCNC: 0.56 MG/DL (ref 0.52–1.04)
ERYTHROCYTE [DISTWIDTH] IN BLOOD BY AUTOMATED COUNT: 13.2 % (ref 10–15)
GFR SERPL CREATININE-BSD FRML MDRD: >90 ML/MIN/1.73M2
GLUCOSE SERPL-MCNC: 99 MG/DL (ref 70–99)
HCT VFR BLD AUTO: 26.1 % (ref 35–47)
HEMOGLOBIN: 8.8 G/DL (ref 11.7–15.7)
MCH RBC QN AUTO: 32.8 PG (ref 26.5–33)
MCHC RBC AUTO-ENTMCNC: 33.7 G/DL (ref 31.5–36.5)
MCV RBC AUTO: 97 FL (ref 78–100)
PLATELET # BLD AUTO: 439 10E9/L (ref 150–450)
POTASSIUM SERPL-SCNC: 4 MMOL/L (ref 3.4–5.3)
RBC # BLD AUTO: 2.68 10E12/L (ref 3.8–5.2)
SODIUM SERPL-SCNC: 138 MMOL/L (ref 133–144)
WBC # BLD AUTO: 8.9 10E9/L (ref 4–11)

## 2017-09-27 PROCEDURE — 99316 NF DSCHRG MGMT 30 MIN+: CPT | Performed by: NURSE PRACTITIONER

## 2017-09-27 RX ORDER — VALACYCLOVIR HYDROCHLORIDE 1 G/1
2000 TABLET, FILM COATED ORAL 2 TIMES DAILY
Qty: 4 TABLET | Refills: 0
Start: 2017-09-27 | End: 2018-06-26

## 2017-09-27 NOTE — PROGRESS NOTES
[place] GERIATRIC SERVICES DISCHARGE SUMMARY    PATIENT'S NAME: Aaliyah Hayes  YOB: 1950  MEDICAL RECORD NUMBER:  [id number]    PRIMARY CARE PROVIDER AND CLINIC RESPONSIBLE AFTER TRANSFER: Jessie High [address] [facility]  / [place] [address]     CODE STATUS/ADVANCE DIRECTIVES DISCUSSION:   CPR/Full code        Allergies   Allergen Reactions     Ciprofloxacin      Facial swelling, tingling on her upper lip, throat slightly closed, racing heart     Oxycodone      Sulfa Drugs      Eye reaction to sulfa-based eye drop       TRANSFERRING PROVIDERS: PANKAJ Dye CNP, Gorge Dumas MD  DATE OF SNF ADMISSION:  September / 24 / 2017  DATE OF SNF (anticipated) DISCHARGE: September / 29 / 2017  [place] DIS[place] [facility] [place]   Nursing Facility: [facility]  stay 09/21/2017 to 09/24/2017.     Condition on Discharge:  Improving.  Function:  independent  Cognitive Scores: No concerns    Equipment: walker and cane    DISCHARGE DIAGNOSIS:   1. History of total left hip arthroplasty    2. Primary osteoarthritis involving multiple joints    3. Benign essential hypertension    4. Acute posthemorrhagic anemia    5. Herpetic ritesh        [facility] Nursing Facility Course:  HPI information obtained from: facility chart records, facility staff, patient report, [facility] chart review and Care Everywhere [facility] chart review.     History of total left hip arthroplasty  Primary osteoarthritis involving multiple joints  Patient transferred to TCU following hospitalization due to left KRISTA with tenotomy on 9/21. Patient has severe DJD. There were no intraoperative or post operative complications except for low BP. She reports less pain now than prior to surgery. She is up and walking. She is taking ASA 325mg BID for DVT prophylaxis and has norco available for pain. She lives alone in two story home and plans to return there. She also  plans to return to work in 4 weeks.     Benign essential hypertension  BP's: 105/69, 112/66, 110/65  PTA meds restarted     Acute posthemorrhagic anemia  hgb dropped from 11 pre operatively to 8.7 post operatively. She is not on iron supp due to recent perforated colon. She plans to follow up with colon rectal surgery upon recovery from KRISTA. Patient encouraged to eat food with iron.     Herpetic ritesh- patient developed herpetic rash on fingers of left hand. She reports h/o of this problem and takes valacyclovir for flare ups.     PAST MEDICAL HISTORY:  has a past medical history of Atypical squamous cells cannot exclude high grade squamous intraepithelial lesion on cytologic smear of cervix (ASC-H) (06/09/2017); Facial cellulitis (11/22/2015); HTN (hypertension); and Perforated sigmoid colon (H) (1/6/2017).    DISCHARGE MEDICATIONS:  Current Outpatient Prescriptions   Medication Sig Dispense Refill     ASPIRIN EC PO Take 325 mg by mouth 2 times daily (with meals)       calcium carbonate (OSCAL 500) 1250 (500 CA) MG TABS tablet Take 1,250 mg by mouth daily       DIPHENHYDRAMINE HCL PO Take 25 mg by mouth nightly as needed       HYDROcodone-acetaminophen (NORCO) 5-325 MG per tablet Take 1-2 tablets by mouth every 4 hours        LISINOPRIL PO Take 5 mg by mouth daily       potassium gluconate 2.5 MEQ TABS Take 5 mEq by mouth daily       senna-docusate (SENOKOT-S;PERICOLACE) 8.6-50 MG per tablet Take 2 tablets by mouth 2 times daily       simvastatin (ZOCOR) 20 MG tablet Take 1 tablet (20 mg) by mouth At Bedtime 30 tablet 8     atenolol (TENORMIN) 100 MG tablet Take 1 tablet (100 mg) by mouth daily 90 tablet 3     Omega-3 Fatty Acids (OMEGA 3 500) 500 MG CAPS Take 500 mg by mouth daily       omeprazole (PRILOSEC) 20 MG CR capsule Take 1 capsule (20 mg) by mouth daily 90 capsule 1     vitamin  B complex with vitamin C (VITAMIN  B COMPLEX) TABS Take 1 tablet by mouth daily  0     magnesium 250 MG tablet Take 2 tablets  "by mouth At Bedtime 30 tablet      multivitamin, therapeutic with minerals (MULTI-VITAMIN) TABS Take 1 tablet by mouth every morning  100 tablet 3       MEDICATION CHANGES/RATIONALE:   Norco changed to 5/325 I to ii  tab q 6 h prn  Valacyclovir 2gm BID one time to herpetic ritesh  Controlled medications sent with patient: Medication: norco 5/325 , 20 tabs given to patient at the time of discharge to take home     ROS:    4 point ROS including Respiratory, CV, GI and , other than that noted in the HPI,  is negative    Physical Exam:   Vitals: /69  Pulse 86  Temp 98.3  F (36.8  C)  Resp 16  Ht 5' 2\" (1.575 m)  Wt 126 lb (57.2 kg)  SpO2 99%  BMI 23.05 kg/m2  BMI= Body mass index is 23.05 kg/(m^2).    GENERAL APPEARANCE:  Alert, in no distress  ENT:  Mouth and posterior oropharynx normal, moist mucous membranes, hearing acuity adequate   EYES:  EOM, conjunctivae, lids, pupils and irises normal  RESP:  respiratory effort and palpation of chest normal, no respiratory distress, Lung sounds clear  CV:  Palpation and auscultation of heart done , rate and rhythm reg, no murmur, no rub or gallop, Edema none  ABDOMEN:  normal bowel sounds, soft, nontender, no hepatosplenomegaly or other masses  M/S:   Gait and station steady, Digits and nails small area of herpetic rash on left index finger  SKIN:  Inspection/Palpation of skin and subcutaneous tissue aquacel on left hip. Left groin with clean dressing. No surrounding redness  NEURO: 2-12 in normal limits and at patient's baseline  PSYCH:  insight and judgement, memory intact , affect and mood normal    DISCHARGE PLAN:  Medication: norco 5/325 , 20 tabs given to patient at the time of discharge to take home  Patient instructed to follow-up with:  PCP in 7-10 days.       Current [place] scheduled appointments:  No future appointments.    MTM referral needed and placed by this provider: No    Pending labs: one  [facility] labs   CBC RESULTS:   Recent Labs   Lab Test " 09/27/17 08/31/17   1047  07/05/17   1808   WBC  8.9   --   7.3   RBC  2.68*   --   3.27*   HGB  8.8*  10.8*  11.3*   HCT  26.1*   --   32.2*   MCV  97   --   99   MCH  32.8   --   34.6*   MCHC  33.7   --   35.1   RDW  13.2   --   11.1   PLT  439   --   288       Last Basic Metabolic Panel:  Recent Labs   Lab Test 09/27/17 08/31/17   1047   NA  138  129*   POTASSIUM  4.0  4.6   CHLORIDE  103  92*   DARIUS  9.6  9.4   CO2  26  29   BUN  13  16   CR  0.56  0.86   GLC  99  83       Liver Function Studies -   Recent Labs   Lab Test  06/09/17   0919  11/11/16   1039   PROTTOTAL  7.4  7.6   ALBUMIN  4.2  4.3   BILITOTAL  0.5  0.4   ALKPHOS  71  87   AST  20  40   ALT  17  42     Discharge Treatments:none    TOTAL DISCHARGE TIME:   Greater than 30 minutes  Electronically signed by:  PANKAJ Dye CNP

## 2017-10-02 ENCOUNTER — TELEPHONE (OUTPATIENT)
Dept: PEDIATRICS | Facility: CLINIC | Age: 67
End: 2017-10-02

## 2017-10-02 NOTE — TELEPHONE ENCOUNTER
"Hospital/TCU/ED for chronic condition Discharge Protocol    \"Hi, my name is Sissy Diaz, a registered nurse, and I am calling from [facility].  I am calling to follow up and see how things are going for you after your recent emergency visit/hospital/TCU stay.\"    Tell me how you are doing now that you are home?\" Doing well.       Discharge Instructions    \"Let's review your discharge instructions.  What is/are the follow-up recommendations?  Pt. Response: w/ surgeon on 10/4/17.     \"Has an appointment with your primary care provider been scheduled?\"   Scheduled on 10/11/17    \"When you see the provider, I would recommend that you bring your medications with you.\"    Medications    \"Tell me what changed about your medicines when you discharged?\"    Changes to chronic meds?    0-1    \"What questions do you have about your medications?\"    None     New diagnoses of heart failure, COPD, diabetes, or MI?    No    Medication reconciliation completed? Yes  Was MTM referral placed (*Make sure to put transitions as reason for referral)?   No    Call Summary    \"What questions or concerns do you have about your recent visit and your follow-up care?\"     none    \"If you have questions or things don't continue to improve, we encourage you contact us through the main clinic number (give number).  Even if the clinic is not open, triage nurses are available 24/7 to help you.     We would like you to know that our clinic has extended hours (provide information).  We also have urgent care (provide details on closest location and hours/contact info)\"      \"Thank you for your time and take care!\"         "

## 2017-10-02 NOTE — TELEPHONE ENCOUNTER
Please contact patient for In-patient follow up.  [phone] (home) none (work)    Visit date: 9/29/17  Diagnosis listed:History Of Total Left Hip Arthroplasty  Number of visits in past 12 months:1

## 2017-10-04 ENCOUNTER — TRANSFERRED RECORDS (OUTPATIENT)
Dept: HEALTH INFORMATION MANAGEMENT | Facility: CLINIC | Age: 67
End: 2017-10-04

## 2017-10-11 ENCOUNTER — OFFICE VISIT (OUTPATIENT)
Dept: PEDIATRICS | Facility: CLINIC | Age: 67
End: 2017-10-11
Payer: MEDICARE

## 2017-10-11 VITALS
DIASTOLIC BLOOD PRESSURE: 62 MMHG | HEART RATE: 89 BPM | BODY MASS INDEX: 23.9 KG/M2 | OXYGEN SATURATION: 98 % | WEIGHT: 129.9 LBS | HEIGHT: 62 IN | TEMPERATURE: 96.7 F | SYSTOLIC BLOOD PRESSURE: 122 MMHG

## 2017-10-11 DIAGNOSIS — I10 BENIGN ESSENTIAL HYPERTENSION: ICD-10-CM

## 2017-10-11 DIAGNOSIS — D64.9 ANEMIA, UNSPECIFIED TYPE: ICD-10-CM

## 2017-10-11 DIAGNOSIS — K63.1 PERFORATED SIGMOID COLON (H): ICD-10-CM

## 2017-10-11 DIAGNOSIS — R19.7 DIARRHEA, UNSPECIFIED TYPE: ICD-10-CM

## 2017-10-11 DIAGNOSIS — Z23 NEED FOR PROPHYLACTIC VACCINATION AND INOCULATION AGAINST INFLUENZA: ICD-10-CM

## 2017-10-11 DIAGNOSIS — K92.1 BLOOD IN STOOL: ICD-10-CM

## 2017-10-11 DIAGNOSIS — Z96.642 STATUS POST LEFT HIP REPLACEMENT: Primary | ICD-10-CM

## 2017-10-11 DIAGNOSIS — Z78.0 ASYMPTOMATIC POSTMENOPAUSAL STATUS: ICD-10-CM

## 2017-10-11 PROCEDURE — 90662 IIV NO PRSV INCREASED AG IM: CPT | Performed by: INTERNAL MEDICINE

## 2017-10-11 PROCEDURE — 99495 TRANSJ CARE MGMT MOD F2F 14D: CPT | Performed by: INTERNAL MEDICINE

## 2017-10-11 PROCEDURE — G0008 ADMIN INFLUENZA VIRUS VAC: HCPCS | Performed by: INTERNAL MEDICINE

## 2017-10-11 NOTE — MR AVS SNAPSHOT
After Visit Summary   10/11/2017    Aaliyah Hayes    MRN: [id number]           Patient Information     Date Of Birth          1950        Visit Information        Provider Department      10/11/2017 2:40 PM Jessie High MD [facility]        Today's Diagnoses     Status post left hip replacement    -  1    Diarrhea, unspecified type        Anemia, unspecified type        Blood in stool        Perforated sigmoid colon (H)        Benign essential hypertension        Asymptomatic postmenopausal status        Need for prophylactic vaccination and inoculation against influenza          Care Instructions    Dr. Smart or Dr. Jernigan I recommend. Send me a message about who you are going to see.     Start on Iron supplement, follow up in one month to recheck hemoglobin.     We will wean down the Atenolol and start Lisinopril     Week 1:  Atenolol down to 50 MG; lisinopril up to 10 MG  Week 2: stop atenolol; lisinopril up to 20 MG     Give this change some time. Check your blood pressure and send me your log in 2 weeks. I need to know if your blood pressure is too high or too low, or, you get dizzy when going from lying to sitting or sitting to standing. Call if bp is under 110 consistently or above 140 consistently.      Stop the magnesium to see if the diarrhea gets better.    Lab appointment on Friday November 10th at 11:10 AM.             Follow-ups after your visit        Additional Services     GASTROENTEROLOGY ADULT REF CONSULT ONLY       Preferred Location: [facility] [phone]      Please be aware that coverage of these services is subject to the terms and limitations of your health insurance plan.  Call member services at your health plan with any benefit or coverage questions.  Any procedures must be performed at a [facility] facility OR coordinated by your clinic's referral office.    Please bring the following with you to your appointment:    (1) Any X-Rays, CTs or  MRIs which have been performed.  Contact the facility where they were done to arrange for  prior to your scheduled appointment.    (2) List of current medications   (3) This referral request   (4) Any documents/labs given to you for this referral                  Your next 10 appointments already scheduled     Nov 10, 2017 11:10 AM CST   LAB with [facility] LAB   Eric Raines ([facility] [place])    [address] [place] [address]  [address]  [facility] [id number] [phone]           Patient must bring picture ID. Patient should be prepared to give a urine specimen  Please do not eat 10-12 hours before your appointment if you are coming in fasting for labs on lipids, cholesterol, or glucose (sugar). Pregnant women should follow their Care Team instructions. Water with medications is okay. Do not drink coffee or other fluids. If you have concerns about taking  your medications, please ask at office or if scheduling via [employer], send a message by clicking on Secure Messaging, Message Your Care Team.              Future tests that were ordered for you today     Open Future Orders        Priority Expected Expires Ordered    Hemoglobin Routine  12/11/2017 10/11/2017    DEXA HIP/PELVIS/SPINE - Future Routine  10/11/2018 10/11/2017            Who to contact     If you have questions or need follow up information about today's clinic visit or your schedule please contact [facility] [place] directly at [phone].  Normal or non-critical lab and imaging results will be communicated to you by MyChart, letter or phone within 4 business days after the clinic has received the results. If you do not hear from us within 7 days, please contact the clinic through MyChart or phone. If you have a critical or abnormal lab result, we will notify you by phone as soon as possible.  Submit refill requests through [employer] or call your pharmacy and they will forward the refill request to us. Please allow 3 business days  "for your refill to be completed.          Additional Information About Your Visit        [employer] Information     [employer] gives you secure access to your electronic health record. If you see a primary care provider, you can also send messages to your care team and make appointments. If you have questions, please call your primary care clinic.  If you do not have a primary care provider, please call [phone] and they will assist you.        Care EveryWhere ID     This is your Care EveryWhere ID. This could be used by other organizations to access your [place] medical records  [id number]        Your Vitals Were     Pulse Temperature Height Pulse Oximetry BMI (Body Mass Index)       89 96.7  F (35.9  C) (Tympanic) 5' 2\" (1.575 m) 98% 23.76 kg/m2        Blood Pressure from Last 3 Encounters:   10/11/17 122/62   09/27/17 105/69   09/25/17 112/66    Weight from Last 3 Encounters:   10/11/17 129 lb 14.4 oz (58.9 kg)   09/27/17 126 lb (57.2 kg)   09/25/17 129 lb 12.8 oz (58.9 kg)              We Performed the Following     FLU VACCINE, INCREASED ANTIGEN, PRESV FREE, AGE 65+ [13245]     GASTROENTEROLOGY ADULT REF CONSULT ONLY     Vaccine Administration, Initial [06246]          Today's Medication Changes          These changes are accurate as of: 10/11/17  3:20 PM.  If you have any questions, ask your nurse or doctor.               Start taking these medicines.        Dose/Directions    ferrous sulfate 142 (45 FE) MG Tbcr   Commonly known as:  SLO-FE   Used for:  Anemia, unspecified type   Started by:  Jessie High MD        Dose:  142 mg   Take 1 tablet (142 mg) by mouth 2 times daily   Quantity:  60 tablet   Refills:  1            Where to get your medicines      [facility] - [place], [place] - [address]  [address], [place] [address]     Phone:  [phone]     ferrous sulfate 142 (45 FE) MG Tbcr                Primary Care Provider Office Phone " # Fax #    Jessie High [phone] [address] [place] [address]        Equal Access to Services     JAN FERNANDO : Hadkelechi darrius magdaleno josiah Sofernandoali, waseamusda luqadaha, qaybta kaalmada marcie, shaq jessicain hayaabrandi nealamarilis hays lanaseembrandi traore. So [facility] [phone].    ATENCIÓN: Si habla español, tiene a coates disposición servicios gratuitos de asistencia lingüística. Llame al [phone].    We comply with applicable federal civil rights laws and [place] laws. We do not discriminate on the basis of race, color, national origin, age, disability, sex, sexual orientation, or gender identity.            Thank you!     Thank you for choosing [facility]  for your care. Our goal is always to provide you with excellent care. Hearing back from our patients is one way we can continue to improve our services. Please take a few minutes to complete the written survey that you may receive in the mail after your visit with us. Thank you!             Your Updated Medication List - Protect others around you: Learn how to safely use, store and throw away your medicines at www.disposemymeds.org.          This list is accurate as of: 10/11/17  3:20 PM.  Always use your most recent med list.                   Brand Name Dispense Instructions for use Diagnosis    ASPIRIN EC PO      Take 325 mg by mouth 2 times daily (with meals)        atenolol 100 MG tablet    TENORMIN    90 tablet    Take 1 tablet (100 mg) by mouth daily    Benign essential hypertension       calcium carbonate 1250 (500 CA) MG Tabs tablet    OSCAL 500     Take 1,250 mg by mouth daily        ferrous sulfate 142 (45 FE) MG Tbcr    SLO-FE    60 tablet    Take 1 tablet (142 mg) by mouth 2 times daily    Anemia, unspecified type       LISINOPRIL PO      Take 5 mg by mouth daily        magnesium 250 MG tablet     30 tablet    Take 2 tablets by mouth At Bedtime        Multi-vitamin Tabs tablet     100 tablet    Take 1  tablet by mouth every morning        OMEGA 3 500 500 MG Caps      Take 500 mg by mouth daily        omeprazole 20 MG CR capsule    priLOSEC    90 capsule    Take 1 capsule (20 mg) by mouth daily    Gastroesophageal reflux disease without esophagitis       potassium gluconate 2.5 MEQ Tabs      Take 5 mEq by mouth daily        simvastatin 20 MG tablet    ZOCOR    30 tablet    Take 1 tablet (20 mg) by mouth At Bedtime    Hyperlipidemia with target LDL less than 130       valACYclovir 1000 mg tablet    VALTREX    4 tablet    Take 2 tablets (2,000 mg) by mouth 2 times daily As needed for herpetic ritesh    Herpetic ritesh       vitamin B complex with vitamin C Tabs tablet      Take 1 tablet by mouth daily

## 2017-10-11 NOTE — PROGRESS NOTES
SUBJECTIVE:   Aaliyah Hayes is a 67 year old female who presents to clinic today for the following health issues:  On 9/21 patient was evaluated at [facility] for the complaint of pain that has failed non operative pain management. An X Ray was performed and revealed arthritic changes in the left hip. Patient was admitted to the hospital on 9/21 and is s/p total hip arthroplasty without complications. She was evaluated by physical therapy and occupational therapy. She was discharged with Hydrocodone 5-325 MG and Senexon-S 8.6-50 MG; and lisinopril 5 MG, and started a  rehabilitation program.     Since in the hospital, patient reports fatigue and body aches. Her hemoglobin decreased to 8.8 post surgery. States she had no trouble with BM after surgery but now she reports watery diarrhea. Reports BM 3x per day that are urgent.  She has had diarrhea in the past for about 1 year; underwent a colonoscopy and had a perforation very early in the procedure. Patient is no longer taking narcotics or stool softener. Denies black stools, incontinence, soaking sweats, fevers and chills.       Reports she took potassium in the past and was told to discontinue. She had to delay her first surgery.     Patient states she went to the chiropractor and he noticed her fingers were blue and thinks it might be Raynauds. She reports chills, paraesthesia and white knuckles.     She is followed by orthopedics, reports she sees them soon and needs a new orthotic. States she has had to wear only flip flops recently.           Hospital Follow-up Visit:    Hospital/Nursing Home/IP Rehab Facility: [facility]  Date of Admission: 09/24/2017  Date of Discharge: 09/29/2017  Reason(s) for Admission: total left hip arthroplasty             Problems taking medications regularly:  None       Medication changes since discharge: None       Problems adhering to non-medication therapy:  None    Summary of hospitalization:   [facility] discharge summary reviewed  Diagnostic Tests/Treatments reviewed.  Follow up needed: none  Other Healthcare Providers Involved in Patient s Care:         None  Update since discharge: improved.     Post Discharge Medication Reconciliation: discharge medications reconciled and changed, per note/orders (see AVS).  Plan of care communicated with patient     Coding guidelines for this visit:  Type of Medical   Decision Making Face-to-Face Visit       within 7 Days of discharge Face-to-Face Visit        within 14 days of discharge   Moderate Complexity [id number]   High Complexity [id number]            Concerns:   Requesting a capsule endoscopy   Raynaud's Syndrome        Problem list and histories reviewed & adjusted, as indicated.  Additional history: as documented    Patient Active Problem List   Diagnosis     Benign essential hypertension     Hyperlipidemia with target LDL less than 130     Insomnia     Allergic rhinitis due to other allergen     Advanced directives, counseling/discussion     Atypical squamous cells cannot exclude high grade squamous intraepithelial lesion on cytologic smear of cervix (ASC-H)     Perforated sigmoid colon (H)     Osteoarthritis of multiple joints     Acute posthemorrhagic anemia     History of total left hip arthroplasty     Status post total hip replacement, left     Herpetic ritesh     Past Surgical History:   Procedure Laterality Date     APPENDECTOMY  2012     COLECTOMY LEFT N/A 1/6/2017    Procedure: COLECTOMY LEFT;  Surgeon: David Gaitan MD;  Location:  OR     COLONOSCOPY N/A 1/6/2017    Procedure: COLONOSCOPY;  Surgeon: Regis Mckoy MD;  Location:  GI     LAPAROSCOPIC ASSISTED SIGMOID COLECTOMY N/A 1/6/2017    Procedure: LAPAROSCOPIC ASSISTED SIGMOID COLECTOMY;  Surgeon: David Gaitan MD;  Location:  OR     ORTHOPEDIC SURGERY       SURGICAL HISTORY OF -   09/2010    Rotator cuff repair-right shoulder     SURGICAL HISTORY OF -    12/2011    Right hip replacement     SURGICAL HISTORY OF -   2005    ACL and MCL left knee       Social History   Substance Use Topics     Smoking status: Never Smoker     Smokeless tobacco: Never Used     Alcohol use 0.0 oz/week     0 Standard drinks or equivalent per week      Comment: 1 glass of wine per night     Family History   Problem Relation Age of Onset     DIABETES Mother 84     Type 2     Hypertension Mother      Alcohol/Drug Father      C.A.D. Paternal Grandmother      Colon Cancer No family hx of          Current Outpatient Prescriptions   Medication Sig Dispense Refill     valACYclovir (VALTREX) 1000 mg tablet Take 2 tablets (2,000 mg) by mouth 2 times daily As needed for herpetic ritesh 4 tablet 0     ASPIRIN EC PO Take 325 mg by mouth 2 times daily (with meals)       LISINOPRIL PO Take 5 mg by mouth daily       potassium gluconate 2.5 MEQ TABS Take 5 mEq by mouth daily       simvastatin (ZOCOR) 20 MG tablet Take 1 tablet (20 mg) by mouth At Bedtime 30 tablet 8     atenolol (TENORMIN) 100 MG tablet Take 1 tablet (100 mg) by mouth daily 90 tablet 3     Omega-3 Fatty Acids (OMEGA 3 500) 500 MG CAPS Take 500 mg by mouth daily       omeprazole (PRILOSEC) 20 MG CR capsule Take 1 capsule (20 mg) by mouth daily 90 capsule 1     vitamin  B complex with vitamin C (VITAMIN  B COMPLEX) TABS Take 1 tablet by mouth daily  0     magnesium 250 MG tablet Take 2 tablets by mouth At Bedtime 30 tablet      multivitamin, therapeutic with minerals (MULTI-VITAMIN) TABS Take 1 tablet by mouth every morning  100 tablet 3     calcium carbonate (OSCAL 500) 1250 (500 CA) MG TABS tablet Take 1,250 mg by mouth daily       Allergies   Allergen Reactions     Ciprofloxacin      Facial swelling, tingling on her upper lip, throat slightly closed, racing heart     Oxycodone      Sulfa Drugs      Eye reaction to sulfa-based eye drop     BP Readings from Last 3 Encounters:   10/11/17 122/62   09/27/17 105/69   09/25/17 112/66    Wt  "Readings from Last 3 Encounters:   10/11/17 58.9 kg (129 lb 14.4 oz)   09/27/17 57.2 kg (126 lb)   09/25/17 58.9 kg (129 lb 12.8 oz)                        Reviewed and updated as needed this visit by clinical staffTobacco  Meds  Med Hx  Surg Hx  Fam Hx  Soc Hx      Reviewed and updated as needed this visit by Provider         ROS:  Constitutional, HEENT, cardiovascular, pulmonary, gi and gu systems are negative, except as otherwise noted.    GI POSITIVE for explosive diarrhea, watery stools   SKIN POSITIVE for discolored nails, white knuckles  NEURO POSITIVE for numbness and parasthesia    This document serves as a record of the services and decisions personally performed and made by Jessie High MD. It was created on her behalf by Kelly Cho, a trained medical scribe. The creation of this document is based the provider's statements to the medical scribe.    Kelly Cho October 11, 2017 2:56 PM  OBJECTIVE:   /62 (BP Location: Right arm, Patient Position: Sitting, Cuff Size: Adult Regular)  Pulse 89  Temp 96.7  F (35.9  C) (Tympanic)  Ht 1.575 m (5' 2\")  Wt 58.9 kg (129 lb 14.4 oz)  SpO2 98%  BMI 23.76 kg/m2  Body mass index is 23.76 kg/(m^2).  GENERAL: healthy, alert and no distress  RESP: lungs clear to auscultation - no rales, rhonchi or wheezes  CV: regular rate and rhythm, normal S1 S2, no S3 or S4, no murmur, click or rub, no peripheral edema  ABDOMEN: soft, tender RLQ no rebound or guarding, no hepatosplenomegaly, no masses and bowel sounds normal  PSYCH: mentation appears normal, affect normal/bright    Diagnostic Test Results:  none     ASSESSMENT/PLAN:     (Z96.642) Status post left hip replacement  (primary encounter diagnosis)  -- patient reports pain is better  -- not using narcotics  -healing well post op; follow up with orthopedics      (R19.7) Diarrhea, unspecified type  -unclear etiology; concerning considering pre op anemia and positive FIT testing   -considering " previous history of perforation, will have her see GI to discuss further w/u- if needs colonoscopy would wonder about capsule colonoscopy?   Plan: GASTROENTEROLOGY ADULT REF CONSULT ONLY           (D64.9) Anemia, unspecified type  -was anemic pre op with hgb 10.8 but normal iron levels; stool occult blood +  -hgb post op was 8.8 on 9/27  -- will start on iron supplement to help with post op anemia  -- check hemoglobin levels in 1 month  -see GI above, suspect she has a slow GI bleed?  If w/u negative would monitor hgb and if remains anemic would have her see hematology   Plan: GASTROENTEROLOGY ADULT REF CONSULT ONLY,         ferrous sulfate (SLO-FE) 142 (45 FE) MG TBCR,         Hemoglobin            (K92.1) Blood in stool  -- patient requesting new GI   -- gastro referral as above   Plan: GASTROENTEROLOGY ADULT REF CONSULT ONLY          (K63.1) Perforated sigmoid colon (H)  --- referral to GI       (I10) Benign essential hypertension  -lisinopril added in the hospital  -as better risk reduction with ace will wean her off atenolol and on to lisinopril  -monitor bp at home and send log in 2 weeks     (Z78.0) Asymptomatic postmenopausal status  -- will scheduled   Plan: DEXA HIP/PELVIS/SPINE - Future           (Z23) Need for prophylactic vaccination and inoculation against influenza  -- Vaccination given today   Plan: FLU VACCINE, INCREASED ANTIGEN, PRESV FREE, AGE        65+ [[id number]], Vaccine Administration, Initial         [[id number]]             Follow up for labs on November 10th  or as needed     The information in this document, created by the medical scribe for me, accurately reflects the services I personally performed and the decisions made by me. I have reviewed and approved this document for accuracy prior to leaving the patient care area.  Jessie High MD  [facility]

## 2017-10-11 NOTE — NURSING NOTE
"Chief Complaint   Patient presents with     Hospital F/U       Initial /62 (BP Location: Right arm, Patient Position: Sitting, Cuff Size: Adult Regular)  Pulse 89  Temp 96.7  F (35.9  C) (Tympanic)  Ht 5' 2\" (1.575 m)  Wt 129 lb 14.4 oz (58.9 kg)  SpO2 98%  BMI 23.76 kg/m2 Estimated body mass index is 23.76 kg/(m^2) as calculated from the following:    Height as of this encounter: 5' 2\" (1.575 m).    Weight as of this encounter: 129 lb 14.4 oz (58.9 kg).  Medication Reconciliation: complete     Haley Jin      "

## 2017-10-11 NOTE — PATIENT INSTRUCTIONS
Dr. Smart or Dr. Jernigan I recommend. Send me a message about who you are going to see.     Start on Iron supplement, follow up in one month to recheck hemoglobin.     We will wean down the Atenolol and start Lisinopril     Week 1:  Atenolol down to 50 MG; lisinopril up to 10 MG  Week 2: stop atenolol; lisinopril up to 20 MG     Give this change some time. Check your blood pressure and send me your log in 2 weeks. I need to know if your blood pressure is too high or too low, or, you get dizzy when going from lying to sitting or sitting to standing. Call if bp is under 110 consistently or above 140 consistently.      Stop the magnesium to see if the diarrhea gets better.    Lab appointment on Friday November 10th at 11:10 AM.

## 2017-10-11 NOTE — PROGRESS NOTES
Injectable Influenza Immunization Documentation    1.  Is the person to be vaccinated sick today?   No    2. Does the person to be vaccinated have an allergy to a component   of the vaccine?   No    3. Has the person to be vaccinated ever had a serious reaction   to influenza vaccine in the past?   No    4. Has the person to be vaccinated ever had Guillain-Barré syndrome?   No    Form completed by Haley Jin

## 2017-10-24 ENCOUNTER — MYC MEDICAL ADVICE (OUTPATIENT)
Dept: PEDIATRICS | Facility: CLINIC | Age: 67
End: 2017-10-24

## 2017-10-24 NOTE — TELEPHONE ENCOUNTER
Appointment advised if symptoms continue.  Advised to go to the ER tonight if symptoms worsen.  ZACHARY Alvares RN

## 2017-10-25 ENCOUNTER — OFFICE VISIT (OUTPATIENT)
Dept: URGENT CARE | Facility: URGENT CARE | Age: 67
End: 2017-10-25
Payer: MEDICARE

## 2017-10-25 VITALS
OXYGEN SATURATION: 99 % | WEIGHT: 129 LBS | DIASTOLIC BLOOD PRESSURE: 62 MMHG | TEMPERATURE: 97.9 F | BODY MASS INDEX: 23.59 KG/M2 | HEART RATE: 115 BPM | SYSTOLIC BLOOD PRESSURE: 142 MMHG

## 2017-10-25 DIAGNOSIS — R00.0 TACHYCARDIA: Primary | ICD-10-CM

## 2017-10-25 PROCEDURE — 99214 OFFICE O/P EST MOD 30 MIN: CPT | Performed by: FAMILY MEDICINE

## 2017-10-25 PROCEDURE — 93000 ELECTROCARDIOGRAM COMPLETE: CPT | Performed by: FAMILY MEDICINE

## 2017-10-25 NOTE — MR AVS SNAPSHOT
After Visit Summary   10/25/2017    Aaliyah Hayes    MRN: [id number]           Patient Information     Date Of Birth          1950        Visit Information        Provider Department      10/25/2017 4:25 PM Hadley Espana MD [facility]        Today's Diagnoses     Tachycardia    -  1      Care Instructions    follow up with your primary care provider for further evaluation of the faster heart rate.                Follow-ups after your visit        Your next 10 appointments already scheduled     Nov 10, 2017 11:10 AM CST   LAB with EA LAB   [facility])    [address] [place] [address]  [address]  [place] [address]   [phone]           Patient must bring picture ID. Patient should be prepared to give a urine specimen  Please do not eat 10-12 hours before your appointment if you are coming in fasting for labs on lipids, cholesterol, or glucose (sugar). Pregnant women should follow their Care Team instructions. Water with medications is okay. Do not drink coffee or other fluids. If you have concerns about taking  your medications, please ask at office or if scheduling via Problemsolutions24, send a message by clicking on Secure Messaging, Message Your Care Team.              Who to contact     If you have questions or need follow up information about today's clinic visit or your schedule please contact [facility] directly at [phone].  Normal or non-critical lab and imaging results will be communicated to you by MyChart, letter or phone within 4 business days after the clinic has received the results. If you do not hear from us within 7 days, please contact the clinic through [employer] or phone. If you have a critical or abnormal lab result, we will notify you by phone as soon as possible.  Submit refill requests through Problemsolutions24 or call your pharmacy and they will forward the refill request to us. Please allow 3 business days for  your refill to be completed.          Additional Information About Your Visit        MyChart Information     [facility] gives you secure access to your electronic health record. If you see a primary care provider, you can also send messages to your care team and make appointments. If you have questions, please call your primary care clinic.  If you do not have a primary care provider, please call [phone] and they will assist you.        Care EveryWhere ID     This is your Care EveryWhere ID. This could be used by other organizations to access your [place] medical records  [id number]        Your Vitals Were     Pulse Temperature Pulse Oximetry BMI (Body Mass Index)          115 97.9  F (36.6  C) (Oral) 99% 23.59 kg/m2         Blood Pressure from Last 3 Encounters:   10/25/17 142/62   10/11/17 122/62   09/27/17 105/69    Weight from Last 3 Encounters:   10/25/17 129 lb (58.5 kg)   10/11/17 129 lb 14.4 oz (58.9 kg)   09/27/17 126 lb (57.2 kg)              We Performed the Following     EKG 12-lead complete w/read - Clinics        Primary Care Provider Office Phone # Fax #    Jessie High [phone] [address] [place] [address]        Equal Access to Services     [facility] Center[facility] : Hadii aad ku hadasho Soomaali, waaxda luqadaha, qaybta kaalmada adeegyada, waxay idiin hayeleonoran heena mcguire . So [facility] [phone].    ATENCIÓN: Si habla español, tiene a coates disposición servicios gratuitos de asistencia lingüística. ame al [phone].    We comply with applicable federal civil rights laws and [place] laws. We do not discriminate on the basis of race, color, national origin, age, disability, sex, sexual orientation, or gender identity.            Thank you!     Thank you for choosing [facility]  for your care. Our goal is always to provide you with excellent care. Hearing back from our patients is one way we can continue to improve our services.  Please take a few minutes to complete the written survey that you may receive in the mail after your visit with us. Thank you!             Your Updated Medication List - Protect others around you: Learn how to safely use, store and throw away your medicines at www.disposemymeds.org.          This list is accurate as of: 10/25/17  5:33 PM.  Always use your most recent med list.                   Brand Name Dispense Instructions for use Diagnosis    ASPIRIN EC PO      Take 325 mg by mouth 2 times daily (with meals)        atenolol 100 MG tablet    TENORMIN    90 tablet    Take 1 tablet (100 mg) by mouth daily    Benign essential hypertension       calcium carbonate 1250 (500 CA) MG Tabs tablet    OSCAL 500     Take 1,250 mg by mouth daily        ferrous sulfate 142 (45 FE) MG Tbcr    SLO-FE    60 tablet    Take 1 tablet (142 mg) by mouth 2 times daily    Anemia, unspecified type       * LISINOPRIL PO      Take 5 mg by mouth daily        * LISINOPRIL PO      Take 20 mg by mouth daily        magnesium 250 MG tablet     30 tablet    Take 2 tablets by mouth At Bedtime        Multi-vitamin Tabs tablet     100 tablet    Take 1 tablet by mouth every morning        OMEGA 3 500 500 MG Caps      Take 500 mg by mouth daily        omeprazole 20 MG CR capsule    priLOSEC    90 capsule    Take 1 capsule (20 mg) by mouth daily    Gastroesophageal reflux disease without esophagitis       potassium gluconate 2.5 MEQ Tabs      Take 5 mEq by mouth daily        simvastatin 20 MG tablet    ZOCOR    30 tablet    Take 1 tablet (20 mg) by mouth At Bedtime    Hyperlipidemia with target LDL less than 130       valACYclovir 1000 mg tablet    VALTREX    4 tablet    Take 2 tablets (2,000 mg) by mouth 2 times daily As needed for herpetic ritesh    Herpetic ritesh       vitamin B complex with vitamin C Tabs tablet      Take 1 tablet by mouth daily        * Notice:  This list has 2 medication(s) that are the same as other medications prescribed  for you. Read the directions carefully, and ask your doctor or other care provider to review them with you.

## 2017-10-25 NOTE — PROGRESS NOTES
SUBJECTIVE:   Aaliyah Hayes is a 67 year old female presenting with a chief complaint of faster heart rates for the past three days.  The faster heart rate started while the patient was sleeping at midnight two days ago.  The pulse was 124 beats per minute.  Patient has mild shortness of breath and no chest discomfort.  No sweats.  Since then, her heart rates have 108 to 120. No h/o thyroid/heart problems.  No dry eyes/dry mouth.  No recent caffeinated beverages .  Patient does not smoke. Patient stopped Atenolol three days ago and then started Lisinopril three days ago.       Onset of symptoms was two days ago.  Course of illness is waxing and waning. .    Patient took Pseudoephedrine two days ago (at 6:30 am).  .    Past Medical History:   Diagnosis Date     Atypical squamous cells cannot exclude high grade squamous intraepithelial lesion on cytologic smear of cervix (ASC-H) 06/09/2017 06/09/17 ASC-H, Neg HPV, Endometrial cells present. [place]= SELENE 1, EMB= Benign     Facial cellulitis 11/22/2015     HTN (hypertension)      Perforated sigmoid colon (H) 1/6/2017     Current Outpatient Prescriptions   Medication Sig Dispense Refill     LISINOPRIL PO Take 20 mg by mouth daily       ferrous sulfate (SLO-FE) 142 (45 FE) MG TBCR Take 1 tablet (142 mg) by mouth 2 times daily 60 tablet 1     valACYclovir (VALTREX) 1000 mg tablet Take 2 tablets (2,000 mg) by mouth 2 times daily As needed for herpetic ritesh 4 tablet 0     ASPIRIN EC PO Take 325 mg by mouth 2 times daily (with meals)       potassium gluconate 2.5 MEQ TABS Take 5 mEq by mouth daily       simvastatin (ZOCOR) 20 MG tablet Take 1 tablet (20 mg) by mouth At Bedtime 30 tablet 8     Omega-3 Fatty Acids (OMEGA 3 500) 500 MG CAPS Take 500 mg by mouth daily       omeprazole (PRILOSEC) 20 MG CR capsule Take 1 capsule (20 mg) by mouth daily 90 capsule 1     vitamin  B complex with vitamin C (VITAMIN  B COMPLEX) TABS Take 1 tablet by mouth daily  0     magnesium  250 MG tablet Take 2 tablets by mouth At Bedtime 30 tablet      multivitamin, therapeutic with minerals (MULTI-VITAMIN) TABS Take 1 tablet by mouth every morning  100 tablet 3     calcium carbonate (OSCAL 500) 1250 (500 CA) MG TABS tablet Take 1,250 mg by mouth daily       LISINOPRIL PO Take 5 mg by mouth daily       atenolol (TENORMIN) 100 MG tablet Take 1 tablet (100 mg) by mouth daily (Patient not taking: Reported on 10/25/2017) 90 tablet 3     Social History   Substance Use Topics     Smoking status: Never Smoker     Smokeless tobacco: Never Used     Alcohol use 0.0 oz/week     0 Standard drinks or equivalent per week      Comment: 1 glass of wine per night       ROS:  Negative except for above in the history of present illness.    OBJECTIVE:  BP:  142/62 Pulse:  115; Oxygen Saturation:  99% on room air.  Temperature:  97.9 F, Weight 129 pounds (58.5 kg).    GENERAL APPEARANCE: healthy, alert and no distress.  No acute respiratory distress.  Patient has a non-toxic appearance.   EYES: Eyes grossly normal to inspection  NECK: No thyromegaly.   RESP: lungs clear to auscultation - no rales, rhonchi or wheezes  CV: tachycardia with regular rhythm.  No murmurs/clicks/gallops.    SKIN: no suspicious lesions or rashes.  No diaphoresis. Skin is pink and warm.     EKG:  Sinus tachycardia is present.  Rate:  108.  Normal axis.  Normal intervals.  QRS intervals are narrow.  No SVT.  There is an isolated rSr' without widened QRS at V2.  There are also isolated inverted T waves at V1 and III  (also seen on a February 5, 2016 EKG).      ASSESSMENT:  Sinus Tachycardia.  This may be the result of recently stopping Atenolol three days ago. No obvious evidence of atrial flutter/fibrillation/emergent tachycardias    PLAN:  Patient declined re-testing of thyroid labs, since she had normal TSH and free T4 on 11/11/2016  follow up with the primary care provider for further evaluation of the faster heart rate.   Continue all of the  current medications for now.    See orders in Epic      Hadley Espana MD

## 2017-10-27 ENCOUNTER — MYC MEDICAL ADVICE (OUTPATIENT)
Dept: PEDIATRICS | Facility: CLINIC | Age: 67
End: 2017-10-27

## 2017-11-07 ENCOUNTER — TRANSFERRED RECORDS (OUTPATIENT)
Dept: HEALTH INFORMATION MANAGEMENT | Facility: CLINIC | Age: 67
End: 2017-11-07

## 2017-11-10 DIAGNOSIS — D64.9 ANEMIA, UNSPECIFIED TYPE: ICD-10-CM

## 2017-11-10 LAB — HGB BLD-MCNC: 12.3 G/DL (ref 11.7–15.7)

## 2017-11-10 PROCEDURE — 85018 HEMOGLOBIN: CPT | Performed by: INTERNAL MEDICINE

## 2017-11-10 PROCEDURE — 36415 COLL VENOUS BLD VENIPUNCTURE: CPT | Performed by: INTERNAL MEDICINE

## 2017-11-17 ENCOUNTER — TRANSFERRED RECORDS (OUTPATIENT)
Dept: HEALTH INFORMATION MANAGEMENT | Facility: CLINIC | Age: 67
End: 2017-11-17

## 2018-01-11 ENCOUNTER — TRANSFERRED RECORDS (OUTPATIENT)
Dept: HEALTH INFORMATION MANAGEMENT | Facility: CLINIC | Age: 68
End: 2018-01-11

## 2018-01-23 ENCOUNTER — MYC MEDICAL ADVICE (OUTPATIENT)
Dept: PEDIATRICS | Facility: CLINIC | Age: 68
End: 2018-01-23

## 2018-02-06 DIAGNOSIS — I10 BENIGN ESSENTIAL HYPERTENSION: ICD-10-CM

## 2018-02-06 NOTE — TELEPHONE ENCOUNTER
"Requested Prescriptions   Pending Prescriptions Disp Refills     atenolol (TENORMIN) 25 MG tablet [Pharmacy Med Name: ATENOLOL 25MG       TAB]    Last Written Prescription Date:  11/9/2017  Last Fill Quantity: 30,  # refills: 2   Last Office Visit with [facility] provider:  10/11/2017   Future Office Visit:      30 tablet 2     Sig: TAKE ONE TABLET BY MOUTH ONCE DAILY    Beta-Blockers Protocol Failed    2/6/2018 11:51 AM       Failed - Blood pressure under 140/90    BP Readings from Last 3 Encounters:   10/25/17 142/62   10/11/17 122/62   09/27/17 105/69                Passed - Patient is age 6 or older       Passed - Recent or future visit with authorizing provider's specialty    Patient had office visit in the last year or has a visit in the next 30 days with authorizing provider.  See \"Patient Info\" tab in inbasket, or \"Choose Columns\" in Meds & Orders section of the refill encounter.               "

## 2018-02-07 RX ORDER — ATENOLOL 25 MG/1
TABLET ORAL
Qty: 90 TABLET | Refills: 0 | Status: SHIPPED | OUTPATIENT
Start: 2018-02-07 | End: 2018-10-11

## 2018-02-07 NOTE — TELEPHONE ENCOUNTER
Routing refill request to provider for review/approval because:  Per OV from 11/9/17    I10) Benign essential hypertension  -lisinopril added in the hospital  -as better risk reduction with ace will wean her off atenolol and on to lisinopril  -monitor bp at home and send log in 2 weeks     Per Mychart encounter on 1/23/18 -- Will continue on atenolol until upcoming Px.     Medication is pended for 90 day supply.     Génesis Yee RN -- [employer]

## 2018-05-30 ENCOUNTER — TRANSFERRED RECORDS (OUTPATIENT)
Dept: HEALTH INFORMATION MANAGEMENT | Facility: CLINIC | Age: 68
End: 2018-05-30

## 2018-05-31 ENCOUNTER — OFFICE VISIT (OUTPATIENT)
Dept: PEDIATRICS | Facility: CLINIC | Age: 68
End: 2018-05-31
Payer: MEDICARE

## 2018-05-31 VITALS
TEMPERATURE: 97.5 F | BODY MASS INDEX: 24.79 KG/M2 | SYSTOLIC BLOOD PRESSURE: 122 MMHG | DIASTOLIC BLOOD PRESSURE: 64 MMHG | HEART RATE: 76 BPM | WEIGHT: 134.7 LBS | OXYGEN SATURATION: 99 % | HEIGHT: 62 IN

## 2018-05-31 DIAGNOSIS — E78.5 HYPERLIPIDEMIA WITH TARGET LDL LESS THAN 130: ICD-10-CM

## 2018-05-31 DIAGNOSIS — I10 BENIGN ESSENTIAL HYPERTENSION: ICD-10-CM

## 2018-05-31 DIAGNOSIS — R20.2 PARESTHESIA: ICD-10-CM

## 2018-05-31 DIAGNOSIS — N95.0 POSTMENOPAUSAL BLEEDING: ICD-10-CM

## 2018-05-31 DIAGNOSIS — M54.2 NECK PAIN: Primary | ICD-10-CM

## 2018-05-31 PROCEDURE — 99214 OFFICE O/P EST MOD 30 MIN: CPT | Performed by: INTERNAL MEDICINE

## 2018-05-31 RX ORDER — CHLORTHALIDONE 25 MG/1
25 TABLET ORAL DAILY
COMMUNITY
End: 2018-08-27

## 2018-05-31 NOTE — MR AVS SNAPSHOT
After Visit Summary   5/31/2018    Aaliyah Hayes    MRN: [id number]           Patient Information     Date Of Birth          1950        Visit Information        Provider Department      5/31/2018 10:20 AM Jessie High MD [facility]        Today's Diagnoses     Neck pain    -  1    Postmenopausal bleeding        Paresthesia        Hyperlipidemia with target LDL less than 130        Benign essential hypertension          Care Instructions    Use heat 10-15 min and use basic stretching, holding each about 10 seconds 5x. Bending forward to touch toes and let arms and head hang. Cat/cow 5-10x would help too.     Work on neck, upper back and low back with physical therapy. Ask orthopedist if they can add that to orders.       Find someone who does therapeutic massage for myofascial release.      Devika Neff in [place]. Pap on or after June 9th; schedule appointment.    You can use Valtrex for oral canker sores. If you need a refill then call me.     Fasting labs on June 23rd at 9:40 AM.           Follow-ups after your visit        Follow-up notes from your care team     Return in 13 months (on 6/26/2019) for Physical Exam.      Your next 10 appointments already scheduled     Jun 23, 2018  9:40 AM CDT   LAB with [facility] LAB   [facility] ([facility])    [address]  [address]  [place] [address] [phone]           Please do not eat 10-12 hours before your appointment if you are coming in fasting for labs on lipids, cholesterol, or glucose (sugar). This does not apply to pregnant women. Water, hot tea and black coffee (with nothing added) are okay. Do not drink other fluids, diet soda or chew gum.            Jun 26, 2018  8:40 AM CDT   PHYSICAL with Jessie High MD   [facility] ([facility])    [address]  [address]  [place] [address] [phone]              Future  "tests that were ordered for you today     Open Future Orders        Priority Expected Expires Ordered    Vitamin B12 Routine  7/31/2018 5/31/2018    TSH Routine  7/31/2018 5/31/2018    T4 FREE Routine  7/31/2018 5/31/2018    Lipid panel reflex to direct LDL Fasting Routine  7/31/2018 5/31/2018    Comprehensive metabolic panel Routine  7/31/2018 5/31/2018    Albumin Random Urine Quantitative with Creat Ratio Routine  7/31/2018 5/31/2018            Who to contact     If you have questions or need follow up information about today's clinic visit or your schedule please contact [facility] directly at [phone].  Normal or non-critical lab and imaging results will be communicated to you by MyChart, letter or phone within 4 business days after the clinic has received the results. If you do not hear from us within 7 days, please contact the clinic through [facility] or phone. If you have a critical or abnormal lab result, we will notify you by phone as soon as possible.  Submit refill requests through [employer] or call your pharmacy and they will forward the refill request to us. Please allow 3 business days for your refill to be completed.          Additional Information About Your Visit        [facility] Information     [employer] gives you secure access to your electronic health record. If you see a primary care provider, you can also send messages to your care team and make appointments. If you have questions, please call your primary care clinic.  If you do not have a primary care provider, please call [phone] and they will assist you.        Care EveryWhere ID     This is your Care EveryWhere ID. This could be used by other organizations to access your [place] medical records  [id number]        Your Vitals Were     Pulse Temperature Height Pulse Oximetry BMI (Body Mass Index)       76 97.5  F (36.4  C) (Oral) 5' 2\" (1.575 m) 99% 24.64 kg/m2        Blood Pressure from Last 3 Encounters:   05/31/18 122/64 "   10/25/17 142/62   10/11/17 122/62    Weight from Last 3 Encounters:   05/31/18 134 lb 11.2 oz (61.1 kg)   10/25/17 129 lb (58.5 kg)   10/11/17 129 lb 14.4 oz (58.9 kg)               Primary Care Provider Office Phone # Fax #    Jessie High [phone] [address] [place] [address]        Equal Access to Services     [facility]: Hadii aad ku hadasho Soomaali, waaxda luqadaha, qaybta kaalmada adeegyada, waxay idiin hayaan adeeg faheemarajon labita . So [facility] [phone].    ATENCIÓN: Si habla español, tiene a coates disposición servicios gratuitos de asistencia lingüística. Llame al [phone].    We comply with applicable federal civil rights laws and [place] laws. We do not discriminate on the basis of race, color, national origin, age, disability, sex, sexual orientation, or gender identity.            Thank you!     Thank you for choosing [facility]  for your care. Our goal is always to provide you with excellent care. Hearing back from our patients is one way we can continue to improve our services. Please take a few minutes to complete the written survey that you may receive in the mail after your visit with us. Thank you!             Your Updated Medication List - Protect others around you: Learn how to safely use, store and throw away your medicines at www.disposemymeds.org.          This list is accurate as of 5/31/18 11:02 AM.  Always use your most recent med list.                   Brand Name Dispense Instructions for use Diagnosis    ASPIRIN EC PO      Take 325 mg by mouth 2 times daily (with meals)        * atenolol 100 MG tablet    TENORMIN    90 tablet    Take 1 tablet (100 mg) by mouth daily    Benign essential hypertension       * atenolol 25 MG tablet    TENORMIN    90 tablet    TAKE ONE TABLET BY MOUTH ONCE DAILY    Benign essential hypertension       calcium carbonate 1250 (500 Ca) MG Tabs tablet    OSCAL 500     Take 1,250 mg by mouth  daily        chlorthalidone 25 MG tablet    HYGROTON     Take 25 mg by mouth daily        ferrous sulfate 142 (45 Fe) MG Tbcr    SLO-FE    60 tablet    Take 1 tablet (142 mg) by mouth 2 times daily    Anemia, unspecified type       * LISINOPRIL PO      Take 5 mg by mouth daily        * LISINOPRIL PO      Take 20 mg by mouth daily        * lisinopril 10 MG tablet    PRINIVIL/ZESTRIL    90 tablet    TAKE ONE TABLET BY MOUTH ONCE DAILY    Benign essential hypertension       magnesium 250 MG tablet     30 tablet    Take 2 tablets by mouth At Bedtime        Multi-vitamin Tabs tablet     100 tablet    Take 1 tablet by mouth every morning        OMEGA 3 500 500 MG Caps      Take 500 mg by mouth daily        omeprazole 20 MG CR capsule    priLOSEC    90 capsule    Take 1 capsule (20 mg) by mouth daily    Gastroesophageal reflux disease without esophagitis       potassium gluconate 2.5 MEQ Tabs      Take 5 mEq by mouth daily        simvastatin 20 MG tablet    ZOCOR    30 tablet    Take 1 tablet (20 mg) by mouth At Bedtime    Hyperlipidemia with target LDL less than 130       valACYclovir 1000 mg tablet    VALTREX    4 tablet    Take 2 tablets (2,000 mg) by mouth 2 times daily As needed for herpetic ritesh    Herpetic ritesh       vitamin B complex with vitamin C Tabs tablet      Take 1 tablet by mouth daily        vitamin E 50 UNIT/ML Soln drops      Take by mouth daily        * Notice:  This list has 5 medication(s) that are the same as other medications prescribed for you. Read the directions carefully, and ask your doctor or other care provider to review them with you.

## 2018-05-31 NOTE — PATIENT INSTRUCTIONS
Use heat 10-15 min and use basic stretching, holding each about 10 seconds 5x. Bending forward to touch toes and let arms and head hang. Cat/cow 5-10x would help too.     Work on neck, upper back and low back with physical therapy. Ask orthopedist if they can add that to orders.       Find someone who does therapeutic massage for myofascial release.      Devika Neff in [place]. Pap on or after June 9th; schedule appointment.    You can use Valtrex for oral canker sores. If you need a refill then call me.     Fasting labs on June 23rd at 9:40 AM.

## 2018-05-31 NOTE — PROGRESS NOTES
"  SUBJECTIVE:   Aaliyah Hayes is a 68 year old female who presents to clinic today for the following health issues:      Pelvic Pain      Duration: a couple months     Description (location/character/radiation): burning, aching in lower back/pelvic region    Intensity:  moderate    Accompanying signs and symptoms: vaginal spotting every 3 or 4 months, general malaise    History (similar episodes/previous evaluation): None    Precipitating or alleviating factors: None    Therapies tried and outcome: None     Aaliyah complains of burning, aching in lower back/pelvic region which started a couple months ago. Sx are accompanied by vaginal spotting every 3-4 months with general malise. Vaginal spotting every 5 months, appears to be vaginal, notices it on clothing. Feels generally like she \"has a touch of something\". Pelvic pain is in the back, not in the front. Had pelvic US July 2017 which was normal. Notes she had an endometrial biopsy August 2017 prior to surgery which was positive for abnormal cells w/OB. OB decided to repeat pap in one year.      Is s/p total hip replacement in September 2017. Notes she has been having hip problems, twisted her hip recently when walking. Had xray and was cleared. Is working with physical therapy on lower leg strengthening.     She complains of paraesthesia, burning in bilateral legs and bilateral shoulder blades. Sx are described as \"pins and needles\" and waxes and wanes; is not painful, but is \"annoying\". Sx in legs radiate from thighs to calves but not into feet.       Problem list and histories reviewed & adjusted, as indicated.  Additional history: as documented    Patient Active Problem List   Diagnosis     Benign essential hypertension     Hyperlipidemia with target LDL less than 130     Insomnia     Allergic rhinitis due to other allergen     Advanced directives, counseling/discussion     Atypical squamous cells cannot exclude high grade squamous intraepithelial lesion on " cytologic smear of cervix (ASC-H)     Perforated sigmoid colon (H)     Osteoarthritis of multiple joints     Status post total hip replacement, left     Herpetic ritesh     Past Surgical History:   Procedure Laterality Date     APPENDECTOMY  2012     COLECTOMY LEFT N/A 1/6/2017    Procedure: COLECTOMY LEFT;  Surgeon: David Gaitan MD;  Location: [facility] OR     COLONOSCOPY N/A 1/6/2017    Procedure: COLONOSCOPY;  Surgeon: Regis Mckoy MD;  Location: [facility] GI     LAPAROSCOPIC ASSISTED SIGMOID COLECTOMY N/A 1/6/2017    Procedure: LAPAROSCOPIC ASSISTED SIGMOID COLECTOMY;  Surgeon: David Gaitan MD;  Location: [facility] OR     ORTHOPEDIC SURGERY       SURGICAL HISTORY OF -   09/2010    Rotator cuff repair-right shoulder     SURGICAL HISTORY OF -   12/2011    Right hip replacement     SURGICAL HISTORY OF -   2005    ACL and MCL left knee       Social History   Substance Use Topics     Smoking status: Never Smoker     Smokeless tobacco: Never Used     Alcohol use 0.0 oz/week     0 Standard drinks or equivalent per week      Comment: 1 glass of wine per night     Family History   Problem Relation Age of Onset     DIABETES Mother 84     Type 2     Hypertension Mother      Alcohol/Drug Father      C.A.D. Paternal Grandmother      Colon Cancer No family hx of          Current Outpatient Prescriptions   Medication Sig Dispense Refill     ASPIRIN EC PO Take 325 mg by mouth 2 times daily (with meals)       atenolol (TENORMIN) 100 MG tablet Take 1 tablet (100 mg) by mouth daily (Patient not taking: Reported on 10/25/2017) 90 tablet 3     atenolol (TENORMIN) 25 MG tablet TAKE ONE TABLET BY MOUTH ONCE DAILY 90 tablet 0     calcium carbonate (OSCAL 500) 1250 (500 CA) MG TABS tablet Take 1,250 mg by mouth daily       chlorthalidone (HYGROTON) 25 MG tablet Take 25 mg by mouth daily       ferrous sulfate (SLO-FE) 142 (45 FE) MG TBCR Take 1 tablet (142 mg) by mouth 2 times daily 60 tablet 1     lisinopril (PRINIVIL/ZESTRIL) 10 MG  tablet TAKE ONE TABLET BY MOUTH ONCE DAILY 90 tablet 3     LISINOPRIL PO Take 20 mg by mouth daily       LISINOPRIL PO Take 5 mg by mouth daily       magnesium 250 MG tablet Take 2 tablets by mouth At Bedtime 30 tablet      multivitamin, therapeutic with minerals (MULTI-VITAMIN) TABS Take 1 tablet by mouth every morning  100 tablet 3     Omega-3 Fatty Acids (OMEGA 3 500) 500 MG CAPS Take 500 mg by mouth daily       omeprazole (PRILOSEC) 20 MG CR capsule Take 1 capsule (20 mg) by mouth daily 90 capsule 1     potassium gluconate 2.5 MEQ TABS Take 5 mEq by mouth daily       simvastatin (ZOCOR) 20 MG tablet Take 1 tablet (20 mg) by mouth At Bedtime 30 tablet 8     valACYclovir (VALTREX) 1000 mg tablet Take 2 tablets (2,000 mg) by mouth 2 times daily As needed for herpetic ritesh 4 tablet 0     vitamin  B complex with vitamin C (VITAMIN  B COMPLEX) TABS Take 1 tablet by mouth daily  0     vitamin E 50 UNIT/ML SOLN drops Take by mouth daily       Allergies   Allergen Reactions     Ciprofloxacin      Facial swelling, tingling on her upper lip, throat slightly closed, racing heart     Oxycodone      Sulfa Drugs      Eye reaction to sulfa-based eye drop       Reviewed and updated as needed this visit by clinical staff  Tobacco  Allergies  Meds  Med Hx  Surg Hx  Fam Hx  Soc Hx      Reviewed and updated as needed this visit by Provider         ROS:  Constitutional, HEENT, cardiovascular, pulmonary, GI, , musculoskeletal, neuro, skin, endocrine and psych systems are negative, except as otherwise noted.    This document serves as a record of the services and decisions personally performed and made by Jessie High MD. It was created on her behalf by Kelly Cho, a trained medical scribe. The creation of this document is based the provider's statements to the medical scribe.    Kelly Cho May 31, 2018 10:51 AM  OBJECTIVE:     /64 (BP Location: Right arm, Patient Position: Sitting, Cuff Size: Adult  "Regular)  Pulse 76  Temp 97.5  F (36.4  C) (Oral)  Ht 1.575 m (5' 2\")  Wt 61.1 kg (134 lb 11.2 oz)  SpO2 99%  BMI 24.64 kg/m2  Body mass index is 24.64 kg/(m^2).  GENERAL: healthy, alert and no distress  NECK: no adenopathy, no asymmetry, masses.  Muscle spasm of trapezius bilaterally.    RESP: lungs clear to auscultation - no rales, rhonchi or wheezes  CV: regular rate and rhythm, normal S1 S2, no S3 or S4, no murmur, click or rub  MS: no gross musculoskeletal defects noted, no edema. No low back pain on palpation.  Strength 5/5 deltoid, tricep, bicep and  strength.    PSYCH: mentation appears normal, affect normal/bright    Diagnostic Test Results:  none     ASSESSMENT/PLAN:   (M54.2) Neck pain  (primary encounter diagnosis)  -pt without radicular symptoms; exam consistent with muscular cause for pain   - advised to call surgeon to get extended orders for neck and back with physical therapy   -- recommended getting therapeutic massage     (N95.0) Postmenopausal bleeding  --ultrasound normal 1 year ago  --suspect that the postmenopausal bleeding is unrelated to her back pain   -- last endometrial biopsy in 2017 revealed abnormal cells, but OB wishes to repeat pap in 1 year   -- patient last US July 2017 normal  -- advised to schedule appointment with OB; recommended provider     (R20.2) Paresthesia  -- sx most consistent with nerve pain  -- will check B12 and thyroids to r/o vitamin defficiency and thyroid   -- advised to call surgeon to get extended orders for neck and back with physical therapy   -- recommended getting therapeutic massage   Plan: Vitamin B12, TSH, T4 FREE            (E78.5) Hyperlipidemia with target LDL less than 130  --future ordered; lab appointment scheduled   Plan: Lipid panel reflex to direct LDL Fasting,         Comprehensive metabolic panel            (I10) Benign essential hypertension  -- labs scheduled for future   Plan: Comprehensive metabolic panel, Albumin Random         " Urine Quantitative with Creat Ratio            Follow up on June 23rd for annual physical or as needed.     The information in this document, created by the medical scribe for me, accurately reflects the services I personally performed and the decisions made by me. I have reviewed and approved this document for accuracy prior to leaving the patient care area.  Jessie High MD  [facility]

## 2018-06-23 DIAGNOSIS — E78.5 HYPERLIPIDEMIA WITH TARGET LDL LESS THAN 130: ICD-10-CM

## 2018-06-23 DIAGNOSIS — I10 BENIGN ESSENTIAL HYPERTENSION: ICD-10-CM

## 2018-06-23 DIAGNOSIS — R20.2 PARESTHESIA: ICD-10-CM

## 2018-06-23 LAB
ALBUMIN SERPL-MCNC: 4.4 G/DL (ref 3.4–5)
ALP SERPL-CCNC: 67 U/L (ref 40–150)
ALT SERPL W P-5'-P-CCNC: 28 U/L (ref 0–50)
ANION GAP SERPL CALCULATED.3IONS-SCNC: 10 MMOL/L (ref 3–14)
AST SERPL W P-5'-P-CCNC: 33 U/L (ref 0–45)
BILIRUB SERPL-MCNC: 0.6 MG/DL (ref 0.2–1.3)
BUN SERPL-MCNC: 17 MG/DL (ref 7–30)
CALCIUM SERPL-MCNC: 9.6 MG/DL (ref 8.5–10.1)
CHLORIDE SERPL-SCNC: 98 MMOL/L (ref 94–109)
CHOLEST SERPL-MCNC: 211 MG/DL
CO2 SERPL-SCNC: 27 MMOL/L (ref 20–32)
CREAT SERPL-MCNC: 0.69 MG/DL (ref 0.52–1.04)
CREAT UR-MCNC: 76 MG/DL
GFR SERPL CREATININE-BSD FRML MDRD: 85 ML/MIN/1.7M2
GLUCOSE SERPL-MCNC: 85 MG/DL (ref 70–99)
HDLC SERPL-MCNC: 65 MG/DL
LDLC SERPL CALC-MCNC: 117 MG/DL
MICROALBUMIN UR-MCNC: 12 MG/L
MICROALBUMIN/CREAT UR: 15.13 MG/G CR (ref 0–25)
NONHDLC SERPL-MCNC: 146 MG/DL
POTASSIUM SERPL-SCNC: 4 MMOL/L (ref 3.4–5.3)
PROT SERPL-MCNC: 7.9 G/DL (ref 6.8–8.8)
SODIUM SERPL-SCNC: 135 MMOL/L (ref 133–144)
T4 FREE SERPL-MCNC: 0.92 NG/DL (ref 0.76–1.46)
TRIGL SERPL-MCNC: 147 MG/DL
TSH SERPL DL<=0.005 MIU/L-ACNC: 1.2 MU/L (ref 0.4–4)
VIT B12 SERPL-MCNC: 336 PG/ML (ref 193–986)

## 2018-06-23 PROCEDURE — 80061 LIPID PANEL: CPT | Performed by: INTERNAL MEDICINE

## 2018-06-23 PROCEDURE — 82607 VITAMIN B-12: CPT | Performed by: INTERNAL MEDICINE

## 2018-06-23 PROCEDURE — 36415 COLL VENOUS BLD VENIPUNCTURE: CPT | Performed by: INTERNAL MEDICINE

## 2018-06-23 PROCEDURE — 82043 UR ALBUMIN QUANTITATIVE: CPT | Performed by: INTERNAL MEDICINE

## 2018-06-23 PROCEDURE — 84439 ASSAY OF FREE THYROXINE: CPT | Performed by: INTERNAL MEDICINE

## 2018-06-23 PROCEDURE — 80053 COMPREHEN METABOLIC PANEL: CPT | Performed by: INTERNAL MEDICINE

## 2018-06-23 PROCEDURE — 84443 ASSAY THYROID STIM HORMONE: CPT | Performed by: INTERNAL MEDICINE

## 2018-06-23 ASSESSMENT — ENCOUNTER SYMPTOMS
CHILLS: 0
DIZZINESS: 0
DYSURIA: 0
DIARRHEA: 0
JOINT SWELLING: 0
HEARTBURN: 1
SORE THROAT: 0
COUGH: 0
FREQUENCY: 0
FEVER: 0
PALPITATIONS: 0
HEMATOCHEZIA: 0
NAUSEA: 0
MYALGIAS: 1
SHORTNESS OF BREATH: 0
WEAKNESS: 0
ARTHRALGIAS: 1
NERVOUS/ANXIOUS: 0
EYE PAIN: 0
CONSTIPATION: 0
HEMATURIA: 0
ABDOMINAL PAIN: 0
PARESTHESIAS: 1
HEADACHES: 0

## 2018-06-23 ASSESSMENT — ACTIVITIES OF DAILY LIVING (ADL)
I_NEED_ASSISTANCE_FOR_THE_FOLLOWING_DAILY_ACTIVITIES:: NO ASSISTANCE IS NEEDED
CURRENT_FUNCTION: NO ASSISTANCE NEEDED

## 2018-06-26 ENCOUNTER — OFFICE VISIT (OUTPATIENT)
Dept: PEDIATRICS | Facility: CLINIC | Age: 68
End: 2018-06-26
Payer: MEDICARE

## 2018-06-26 VITALS
HEART RATE: 82 BPM | OXYGEN SATURATION: 98 % | TEMPERATURE: 97.5 F | BODY MASS INDEX: 24.37 KG/M2 | WEIGHT: 132.4 LBS | DIASTOLIC BLOOD PRESSURE: 64 MMHG | SYSTOLIC BLOOD PRESSURE: 128 MMHG | HEIGHT: 62 IN

## 2018-06-26 DIAGNOSIS — Z00.00 ROUTINE GENERAL MEDICAL EXAMINATION AT A HEALTH CARE FACILITY: Primary | ICD-10-CM

## 2018-06-26 DIAGNOSIS — Z23 NEED FOR PROPHYLACTIC VACCINATION AGAINST STREPTOCOCCUS PNEUMONIAE (PNEUMOCOCCUS): ICD-10-CM

## 2018-06-26 DIAGNOSIS — K21.9 GASTROESOPHAGEAL REFLUX DISEASE WITHOUT ESOPHAGITIS: ICD-10-CM

## 2018-06-26 DIAGNOSIS — Z78.0 ASYMPTOMATIC POSTMENOPAUSAL STATUS: ICD-10-CM

## 2018-06-26 DIAGNOSIS — I10 BENIGN ESSENTIAL HYPERTENSION: ICD-10-CM

## 2018-06-26 DIAGNOSIS — D64.9 ANEMIA, UNSPECIFIED TYPE: ICD-10-CM

## 2018-06-26 DIAGNOSIS — B00.89 HERPETIC WHITLOW: ICD-10-CM

## 2018-06-26 DIAGNOSIS — E78.5 HYPERLIPIDEMIA WITH TARGET LDL LESS THAN 130: ICD-10-CM

## 2018-06-26 DIAGNOSIS — Z12.31 VISIT FOR SCREENING MAMMOGRAM: ICD-10-CM

## 2018-06-26 PROCEDURE — G0439 PPPS, SUBSEQ VISIT: HCPCS | Performed by: INTERNAL MEDICINE

## 2018-06-26 PROCEDURE — 90471 IMMUNIZATION ADMIN: CPT | Performed by: INTERNAL MEDICINE

## 2018-06-26 PROCEDURE — 99213 OFFICE O/P EST LOW 20 MIN: CPT | Mod: 25 | Performed by: INTERNAL MEDICINE

## 2018-06-26 PROCEDURE — 90732 PPSV23 VACC 2 YRS+ SUBQ/IM: CPT | Performed by: INTERNAL MEDICINE

## 2018-06-26 RX ORDER — LISINOPRIL 10 MG/1
10 TABLET ORAL DAILY
Qty: 90 TABLET | Refills: 3 | Status: SHIPPED | OUTPATIENT
Start: 2018-06-26 | End: 2019-04-25

## 2018-06-26 RX ORDER — VALACYCLOVIR HYDROCHLORIDE 1 G/1
2000 TABLET, FILM COATED ORAL 2 TIMES DAILY
Qty: 30 TABLET | Refills: 3 | Status: SHIPPED | OUTPATIENT
Start: 2018-06-26 | End: 2022-10-07

## 2018-06-26 ASSESSMENT — ENCOUNTER SYMPTOMS
NAUSEA: 0
EYE PAIN: 0
FREQUENCY: 0
SHORTNESS OF BREATH: 0
CONSTIPATION: 0
PARESTHESIAS: 1
HEMATOCHEZIA: 0
SORE THROAT: 0
DYSURIA: 0
COUGH: 0
NERVOUS/ANXIOUS: 0
WEAKNESS: 0
DIZZINESS: 0
HEADACHES: 0
MYALGIAS: 1
ABDOMINAL PAIN: 0
JOINT SWELLING: 0
CHILLS: 0
HEMATURIA: 0
FEVER: 0
DIARRHEA: 0
PALPITATIONS: 0
ARTHRALGIAS: 1
HEARTBURN: 1

## 2018-06-26 ASSESSMENT — ACTIVITIES OF DAILY LIVING (ADL): CURRENT_FUNCTION: NO ASSISTANCE NEEDED

## 2018-06-26 NOTE — PROGRESS NOTES
"SUBJECTIVE:   Aaliyah Hayes is a 68 year old female who presents for Preventive Visit.    Are you in the first 12 months of your Medicare coverage?  No    Physical   Annual:     Getting at least 3 servings of Calcium per day:  Yes    Bi-annual eye exam:  NO    Dental care twice a year:  Yes    Sleep apnea or symptoms of sleep apnea:  None    Diet:  Regular (no restrictions)    Frequency of exercise:  2-3 days/week    Duration of exercise:  15-30 minutes    Taking medications regularly:  Yes    Medication side effects:  Not applicable    Additional concerns today:  No    Ability to successfully perform activities of daily living: no assistance needed    Home Safety:  No safety concerns identified    Hearing Impairment: no hearing concerns      Aaliyah presents to the clinic for her annual physical examination. Patient reports she is feeling \"the best I have in my life\". Is requesting refill on medications; notes she still has \"racing pulse at night\", but is looking to get off of chorthalidone and atenolol. BPs are averaging 120's/80's. BP in clinic was 128/64; weight was 132 lbs. Experiences heartburn after eating late at night (8 PM).     Patient reports she is going to [employer] in [place] and will be starting myofascial release for shoulders. Has been doing physical therapy which has significantly improved right shoulder pain. Is going to see physical therapy for left shoulder since she does not want surgery at this time.           Fall risk:  Fallen 2 or more times in the past year?: No  Any fall with injury in the past year?: No    COGNITIVE SCREEN  1) Repeat 3 items (Leader, Season, Table)    2) Clock draw: NORMAL  3) 3 item recall: Recalls 3 objects  Results: 3 items recalled: COGNITIVE IMPAIRMENT LESS LIKELY    Mini-CogTM Copyright S Elham. Licensed by the author for use in [facility]; reprinted with permission (buck@.[facility]). All rights reserved.        Reviewed and updated as " needed this visit by clinical staff  Tobacco  Allergies  Meds  Med Hx  Surg Hx  Fam Hx  Soc Hx        Reviewed and updated as needed this visit by Provider        Social History   Substance Use Topics     Smoking status: Never Smoker     Smokeless tobacco: Never Used     Alcohol use 0.0 oz/week     0 Standard drinks or equivalent per week      Comment: 1 glass of wine per night       Alcohol Use 6/23/2018   If you drink alcohol do you typically have greater than 3 drinks per day OR greater than 7 drinks per week? No   No flowsheet data found.            Today's PHQ-2 Score:   PHQ-2 ( 1999 [employer]) 6/23/2018   Q1: Little interest or pleasure in doing things 0   Q2: Feeling down, depressed or hopeless 0   PHQ-2 Score 0   Q1: Little interest or pleasure in doing things Not at all   Q2: Feeling down, depressed or hopeless Not at all   PHQ-2 Score 0        Do you feel safe in your environment - Yes    Do you have a Health Care Directive?: No: Advance care planning was reviewed with patient; patient declined at this time.    Current providers sharing in care for this patient include:   Patient Care Team:  Jessie High MD as PCP - General (Pediatrics)    The following health maintenance items are reviewed in Epic and correct as of today:  Health Maintenance   Topic Date Due     DEXA Q3 YR  01/28/1982     KRISTAN QUESTIONNAIRE 1 YEAR  11/11/2017     MAMMO SCREEN Q2 YR (SYSTEM ASSIGNED)  03/04/2018     PNEUMOCOCCAL (2 of 2 - PPSV23) 06/09/2018     PHQ-9 Q1YR  06/09/2018     HPV Q1 Year  06/09/2018     FALL RISK ASSESSMENT  06/09/2018     PAP Q1 YR DIAGNOSTIC  09/12/2018     CMP Q1 YR  06/23/2019     LIPID MONITORING Q1 YEAR  06/23/2019     ADVANCE DIRECTIVE PLANNING Q5 YRS  09/26/2022     TETANUS IMMUNIZATION (SYSTEM ASSIGNED)  06/14/2023     COLON CANCER SCREEN (SYSTEM ASSIGNED)  01/06/2027     INFLUENZA VACCINE  Completed     HEPATITIS C SCREENING  Completed     Labs reviewed in EPIC  BP Readings from Last 3  Encounters:   06/26/18 128/64   05/31/18 122/64   10/25/17 142/62    Wt Readings from Last 3 Encounters:   06/26/18 60.1 kg (132 lb 6.4 oz)   05/31/18 61.1 kg (134 lb 11.2 oz)   10/25/17 58.5 kg (129 lb)                  Patient Active Problem List   Diagnosis     Benign essential hypertension     Hyperlipidemia with target LDL less than 130     Insomnia     Allergic rhinitis due to other allergen     Advanced directives, counseling/discussion     Atypical squamous cells cannot exclude high grade squamous intraepithelial lesion on cytologic smear of cervix (ASC-H)     Perforated sigmoid colon (H)     Osteoarthritis of multiple joints     Status post total hip replacement, left     Herpetic ritesh     Anemia, unspecified type     Past Surgical History:   Procedure Laterality Date     APPENDECTOMY  2012     COLECTOMY LEFT N/A 1/6/2017    Procedure: COLECTOMY LEFT;  Surgeon: David Gaitan MD;  Location:  OR     COLONOSCOPY N/A 1/6/2017    Procedure: COLONOSCOPY;  Surgeon: Regis Mckoy MD;  Location:  GI     LAPAROSCOPIC ASSISTED SIGMOID COLECTOMY N/A 1/6/2017    Procedure: LAPAROSCOPIC ASSISTED SIGMOID COLECTOMY;  Surgeon: David Gaitan MD;  Location:  OR     ORTHOPEDIC SURGERY       SURGICAL HISTORY OF -   09/2010    Rotator cuff repair-right shoulder     SURGICAL HISTORY OF -   12/2011    Right hip replacement     SURGICAL HISTORY OF -   2005    ACL and MCL left knee       Social History   Substance Use Topics     Smoking status: Never Smoker     Smokeless tobacco: Never Used     Alcohol use 0.0 oz/week     0 Standard drinks or equivalent per week      Comment: 1 glass of wine per night     Family History   Problem Relation Age of Onset     Diabetes Mother 84     Type 2     Hypertension Mother      Alcohol/Drug Father      C.A.D. Paternal Grandmother      Colon Cancer No family hx of          Current Outpatient Prescriptions   Medication Sig Dispense Refill     ASPIRIN EC PO Take 325 mg by  mouth 2 times daily (with meals)       atenolol (TENORMIN) 25 MG tablet TAKE ONE TABLET BY MOUTH ONCE DAILY 90 tablet 0     calcium carbonate (OSCAL 500) 1250 (500 CA) MG TABS tablet Take 1,250 mg by mouth daily       chlorthalidone (HYGROTON) 25 MG tablet Take 25 mg by mouth daily       ferrous sulfate (SLO-FE) 142 (45 FE) MG TBCR Take 1 tablet (142 mg) by mouth 2 times daily 60 tablet 1     lisinopril (PRINIVIL/ZESTRIL) 10 MG tablet Take 1 tablet (10 mg) by mouth daily 90 tablet 3     [DISCONTINUED] lisinopril (PRINIVIL/ZESTRIL) 10 MG tablet TAKE ONE TABLET BY MOUTH ONCE DAILY 90 tablet 3     magnesium 250 MG tablet Take 2 tablets by mouth At Bedtime 30 tablet      multivitamin, therapeutic with minerals (MULTI-VITAMIN) TABS Take 1 tablet by mouth every morning  100 tablet 3     Omega-3 Fatty Acids (OMEGA 3 500) 500 MG CAPS Take 500 mg by mouth daily       omeprazole (PRILOSEC) 20 MG CR capsule Take 1 capsule (20 mg) by mouth daily 90 capsule 3     omeprazole (PRILOSEC) 20 MG CR capsule Take 1 capsule (20 mg) by mouth daily 90 capsule 1     potassium gluconate 2.5 MEQ TABS Take 5 mEq by mouth daily       valACYclovir (VALTREX) 1000 mg tablet Take 2 tablets (2,000 mg) by mouth 2 times daily As needed for herpetic ritesh 30 tablet 3     vitamin  B complex with vitamin C (VITAMIN  B COMPLEX) TABS Take 1 tablet by mouth daily  0     vitamin E 50 UNIT/ML SOLN drops Take by mouth daily       [DISCONTINUED] valACYclovir (VALTREX) 1000 mg tablet Take 2 tablets (2,000 mg) by mouth 2 times daily As needed for herpetic ritesh 4 tablet 0     [DISCONTINUED] atenolol (TENORMIN) 100 MG tablet Take 1 tablet (100 mg) by mouth daily 90 tablet 3     [DISCONTINUED] LISINOPRIL PO Take 20 mg by mouth daily       [DISCONTINUED] LISINOPRIL PO Take 5 mg by mouth daily       Allergies   Allergen Reactions     Ciprofloxacin      Facial swelling, tingling on her upper lip, throat slightly closed, racing heart     Oxycodone      Sulfa Drugs  "     Eye reaction to sulfa-based eye drop           Review of Systems   Constitutional: Negative for chills and fever.   HENT: Positive for hearing loss. Negative for congestion, ear pain and sore throat.    Eyes: Negative for pain and visual disturbance.   Respiratory: Negative for cough and shortness of breath.    Cardiovascular: Negative for chest pain, palpitations and peripheral edema.   Gastrointestinal: Positive for heartburn. Negative for abdominal pain, constipation, diarrhea, hematochezia and nausea.   Genitourinary: Negative for dysuria, frequency, genital sores, hematuria, pelvic pain, urgency, vaginal bleeding and vaginal discharge.   Musculoskeletal: Positive for arthralgias and myalgias. Negative for joint swelling.   Skin: Negative for rash.   Neurological: Positive for paresthesias. Negative for dizziness, weakness and headaches.   Psychiatric/Behavioral: Negative for mood changes. The patient is not nervous/anxious.          OBJECTIVE:   /64 (BP Location: Right arm, Patient Position: Sitting, Cuff Size: Adult Regular)  Pulse 82  Temp 97.5  F (36.4  C) (Oral)  Ht 5' 2\" (1.575 m)  Wt 132 lb 6.4 oz (60.1 kg)  SpO2 98%  BMI 24.22 kg/m2 Estimated body mass index is 24.22 kg/(m^2) as calculated from the following:    Height as of this encounter: 5' 2\" (1.575 m).    Weight as of this encounter: 132 lb 6.4 oz (60.1 kg).  Physical Exam  GENERAL APPEARANCE:  alert and no distress  EYES: Eyes grossly normal to inspection, PERRL and conjunctivae and sclerae normal  HENT: ear canals and TM's normal, nose and mouth without ulcers or lesions, oropharynx clear and oral mucous membranes moist  NECK: no adenopathy, no asymmetry, masses, or scars and thyroid normal to palpation  RESP: lungs clear to auscultation - no rales, rhonchi or wheezes  BREAST: normal without masses, tenderness or nipple discharge and no palpable axillary masses or adenopathy  CV: regular rate and rhythm, normal S1 S2, no S3 or S4, " no murmur, click or rub, no peripheral edema   ABDOMEN: soft, nontender, no hepatosplenomegaly, no masses and bowel sounds normal  MS: no musculoskeletal defects are noted and gait is age appropriate without ataxia  SKIN: no suspicious lesions or rashes  NEURO: Normal strength and tone, sensory exam grossly normal, mentation intact and speech normal  PSYCH: mentation appears normal and affect normal/bright    Diagnostic Test Results:  No results found for this or any previous visit (from the past 24 hour(s)).    ASSESSMENT / PLAN:   (Z00.00) Routine general medical examination at a health care facility  (primary encounter diagnosis)  -- immunizations utd   -- encouraged regular exercise and balanced diet   -- schedule mammogram   -- schedule DEXA   -- pap through OB/GYN   -- colon utd     (I10) Benign essential hypertension  -- currently controlled; avg in 120/80   -- advised patient she can stop chorthalidone  --had problem with significant tachycardia coming off b blocker in past, continue to wean dose and the stop   -- continue to check BP, advised patient to contact me if BP increase   -- if BPs increase will increase lisinopril   Plan: lisinopril (PRINIVIL/ZESTRIL) 10 MG tablet    (E78.5) Hyperlipidemia with target LDL less than 130  -- cholesterol has decreased signficantly, but ASCVD risk still at 8%   -- patient wishing to continue with diet/lifestyle modifications to control cholesterol  -- will revisit in 1 year     (Z78.0) Asymptomatic postmenopausal status  -- schedule DEXA   Plan: DEXA HIP/PELVIS/SPINE - Future      (Z12.31) Visit for screening mammogram  -- schedule mammogram   Plan: MA SCREENING DIGITAL BILAT - Future  (s+30)      (Z23) Need for prophylactic vaccination against Streptococcus pneumoniae (pneumococcus)  -- vaccinated today   Plan: PNEUMOCOCCAL VACCINE,ADULT,SQ OR IM      (B00.89) Herpetic ritesh  -- controlled; refilled medication   Plan: valACYclovir (VALTREX) 1000 mg tablet         "    (K21.9) Gastroesophageal reflux disease without esophagitis  -- controlled on current medication; advised not eating late at night and avoid lying down after eating   Plan: omeprazole (PRILOSEC) 20 MG CR capsule           (D64.9) Anemia, unspecified type  -- controlled currently   -recheck with next lab work.     Follow up for annual care or as needed         The 10-year ASCVD risk score (Cher CHAMBERLAIN Jr, et al., 2013) is: 8%    Values used to calculate the score:      Age: 68 years      Sex: Female      Is Non- : No      Diabetic: No      Tobacco smoker: No      Systolic Blood Pressure: 128 mmHg      Is BP treated: Yes      HDL Cholesterol: 65 mg/dL      Total Cholesterol: 211 mg/dL    End of Life Planning:  Patient currently has an advanced directive: Yes.  Practitioner is supportive of decision.    COUNSELING:  Reviewed preventive health counseling, as reflected in patient instructions       Regular exercise       Healthy diet/nutrition       Vision screening       Dental care    BP Readings from Last 1 Encounters:   06/26/18 128/64     Estimated body mass index is 24.22 kg/(m^2) as calculated from the following:    Height as of this encounter: 5' 2\" (1.575 m).    Weight as of this encounter: 132 lb 6.4 oz (60.1 kg).           reports that she has never smoked. She has never used smokeless tobacco.      Appropriate preventive services were discussed with this patient, including applicable screening as appropriate for cardiovascular disease, diabetes, osteopenia/osteoporosis, and glaucoma.  As appropriate for age/gender, discussed screening for colorectal cancer, prostate cancer, breast cancer, and cervical cancer. Checklist reviewing preventive services available has been given to the patient.    Reviewed patients plan of care and provided an AVS. The Basic Care Plan (routine screening as documented in Health Maintenance) for Aaliyah meets the Care Plan requirement. This Care Plan has been " established and reviewed with the Patient.    Counseling Resources:  ATP IV Guidelines  Pooled Cohorts Equation Calculator  Breast Cancer Risk Calculator  FRAX Risk Assessment  ICSI Preventive Guidelines  Dietary Guidelines for Americans, 2010  USDA's MyPlate  ASA Prophylaxis  Lung CA Screening    The information in this document, created by the medical scribe for me, accurately reflects the services I personally performed and the decisions made by me. I have reviewed and approved this document for accuracy prior to leaving the patient care area.  Jessie High MD  [facility] [place]  Answers for HPI/ROS submitted by the patient on 6/23/2018   PHQ-2 Score: 0

## 2018-06-26 NOTE — MR AVS SNAPSHOT
After Visit Summary   6/26/2018    Aaliyah Hayes    MRN: [id number]           Patient Information     Date Of Birth          1950        Visit Information        Provider Department      6/26/2018 8:40 AM Jessie High MD [facility]        Today's Diagnoses     Routine general medical examination at a health care facility    -  1    Benign essential hypertension        Hyperlipidemia with target LDL less than 130        Asymptomatic postmenopausal status        Visit for screening mammogram        Need for prophylactic vaccination against Streptococcus pneumoniae (pneumococcus)        Herpetic ritesh        Gastroesophageal reflux disease without esophagitis        Anemia, unspecified type          Care Instructions    Go ahead and stop chorthalidone. See what your blood pressures do without it. Do not stop atenolol.     Send me BP in 2 weeks.     Continue to work with diet/excersise to control cholesterol.     Schedule mammogram and bone density scanning.     Preventive Health Recommendations  Female Ages 65 +    Yearly exam:     See your health care provider every year in order to  o Review health changes.   o Discuss preventive care.    o Review your medicines if your doctor has prescribed any.      You no longer need a yearly Pap test unless you've had an abnormal Pap test in the past 10 years. If you have vaginal symptoms, such as bleeding or discharge, be sure to talk with your provider about a Pap test.      Every 1 to 2 years, have a mammogram.  If you are over 69, talk with your health care provider about whether or not you want to continue having screening mammograms.      Every 10 years, have a colonoscopy. Or, have a yearly FIT test (stool test). These exams will check for colon cancer.       Have a cholesterol test every 5 years, or more often if your doctor advises it.       Have a diabetes test (fasting glucose) every three years. If you are at risk for  diabetes, you should have this test more often.       At age 65, have a bone density scan (DEXA) to check for osteoporosis (brittle bone disease).    Shots:    Get a flu shot each year.    Get a tetanus shot every 10 years.    Talk to your doctor about your pneumonia vaccines. There are now two you should receive - Pneumovax (PPSV 23) and Prevnar (PCV 13).    Talk to your pharmacist about the shingles vaccine.    Talk to your doctor about the hepatitis B vaccine.    Nutrition:     Eat at least 5 servings of fruits and vegetables each day.      Eat whole-grain bread, whole-wheat pasta and brown rice instead of white grains and rice.      Get adequate about Calcium and Vitamin D.     Lifestyle    Exercise at least 150 minutes a week (30 minutes a day, 5 days a week). This will help you control your weight and prevent disease.      Limit alcohol to one drink per day.      No smoking.       Wear sunscreen to prevent skin cancer.       See your dentist twice a year for an exam and cleaning.      See your eye doctor every 1 to 2 years to screen for conditions such as glaucoma, macular degeneration, cataracts, etc           Follow-ups after your visit        Your next 10 appointments already scheduled     Jul 12, 2018  2:00 PM CDT   PHYS[facility] with Devika Neff,    [facility]    [address] [place]  [facility] [address] [phone]              Future tests that were ordered for you today     Open Future Orders        Priority Expected Expires Ordered    DEXA HIP/PELVIS/SPINE - Future Routine  6/26/2019 6/26/2018    MA SCREENING DIGITAL BILAT - Future  (s+30) Routine  6/26/2019 6/26/2018            Who to contact     If you have questions or need follow up information about today's clinic visit or your schedule please contact [facility] [place] directly at [phone].  Normal or non-critical lab and imaging results will be communicated to you by [employer]  "letter or phone within 4 business days after the clinic has received the results. If you do not hear from us within 7 days, please contact the clinic through [employer] or phone. If you have a critical or abnormal lab result, we will notify you by phone as soon as possible.  Submit refill requests through [employer] or call your pharmacy and they will forward the refill request to us. Please allow 3 business days for your refill to be completed.          Additional Information About Your Visit        "[email or web address][email or web address]     [employer] gives you secure access to your electronic health record. If you see a primary care provider, you can also send messages to your care team and make appointments. If you have questions, please call your primary care clinic.  If you do not have a primary care provider, please call [phone] and they will assist you.        Care EveryWhere ID     This is your Care EveryWhere ID. This could be used by other organizations to access your [facility] medical records  [id number]        Your Vitals Were     Pulse Temperature Height Pulse Oximetry BMI (Body Mass Index)       82 97.5  F (36.4  C) (Oral) 5' 2\" (1.575 m) 98% 24.22 kg/m2        Blood Pressure from Last 3 Encounters:   06/26/18 128/64   05/31/18 122/64   10/25/17 142/62    Weight from Last 3 Encounters:   06/26/18 132 lb 6.4 oz (60.1 kg)   05/31/18 134 lb 11.2 oz (61.1 kg)   10/25/17 129 lb (58.5 kg)              We Performed the Following     PNEUMOCOCCAL VACCINE,ADULT,SQ OR IM          Today's Medication Changes          These changes are accurate as of 6/26/18  9:15 AM.  If you have any questions, ask your nurse or doctor.               These medicines have changed or have updated prescriptions.        Dose/Directions    lisinopril 10 MG tablet   Commonly known as:  PRINIVIL/ZESTRIL   This may have changed:  See the new instructions.   Used for:  Benign essential hypertension   Changed by:  Jessie High MD        Dose:  10 " mg   Take 1 tablet (10 mg) by mouth daily   Quantity:  90 tablet   Refills:  3         Stop taking these medicines if you haven't already. Please contact your care team if you have questions.     simvastatin 20 MG tablet   Commonly known as:  ZOCOR   Stopped by:  Jessie High MD                Where to get your medicines      These medications were sent to [id number] - [place], [place] - [address]  [address], [place] [address]     Phone:  [phone]     lisinopril 10 MG tablet    omeprazole 20 MG CR capsule    valACYclovir 1000 mg tablet                Primary Care Provider Office Phone # Fax #    Jessie High [phone] [address] [place] [address]        Equal Access to Services     [facility]: Hadii darrius ku hadasho Soomaali, waaxda luqadaha, qaybta kaalmada adeegyada, waxay idiin haytesha mcguire . So [facility] [phone].    ATENCIÓN: Si habla español, tiene a coates disposición servicios gratuitos de asistencia lingüística. [facility] [phone].    We comply with applicable federal civil rights laws and [place] laws. We do not discriminate on the basis of race, color, national origin, age, disability, sex, sexual orientation, or gender identity.            Thank you!     Thank you for choosing [facility]  for your care. Our goal is always to provide you with excellent care. Hearing back from our patients is one way we can continue to improve our services. Please take a few minutes to complete the written survey that you may receive in the mail after your visit with us. Thank you!             Your Updated Medication List - Protect others around you: Learn how to safely use, store and throw away your medicines at www.disposemymeds.org.          This list is accurate as of 6/26/18  9:15 AM.  Always use your most recent med list.                   Brand Name Dispense Instructions for use Diagnosis     ASPIRIN EC PO      Take 325 mg by mouth 2 times daily (with meals)        atenolol 25 MG tablet    TENORMIN    90 tablet    TAKE ONE TABLET BY MOUTH ONCE DAILY    Benign essential hypertension       calcium carbonate 1250 (500 Ca) MG Tabs tablet    OSCAL 500     Take 1,250 mg by mouth daily        chlorthalidone 25 MG tablet    HYGROTON     Take 25 mg by mouth daily        ferrous sulfate 142 (45 Fe) MG Tbcr    SLO-FE    60 tablet    Take 1 tablet (142 mg) by mouth 2 times daily    Anemia, unspecified type       lisinopril 10 MG tablet    PRINIVIL/ZESTRIL    90 tablet    Take 1 tablet (10 mg) by mouth daily    Benign essential hypertension       magnesium 250 MG tablet     30 tablet    Take 2 tablets by mouth At Bedtime        Multi-vitamin Tabs tablet     100 tablet    Take 1 tablet by mouth every morning        OMEGA 3 500 500 MG Caps      Take 500 mg by mouth daily        omeprazole 20 MG CR capsule    priLOSEC    90 capsule    Take 1 capsule (20 mg) by mouth daily    Gastroesophageal reflux disease without esophagitis       potassium gluconate 2.5 MEQ Tabs      Take 5 mEq by mouth daily        valACYclovir 1000 mg tablet    VALTREX    30 tablet    Take 2 tablets (2,000 mg) by mouth 2 times daily As needed for herpetic ritesh    Herpetic ritesh       vitamin B complex with vitamin C Tabs tablet      Take 1 tablet by mouth daily        vitamin E 50 UNIT/ML Soln drops      Take by mouth daily

## 2018-06-26 NOTE — PATIENT INSTRUCTIONS
Go ahead and stop chorthalidone. See what your blood pressures do without it. Do not stop atenolol.     Send me BP in 2 weeks.     Continue to work with diet/excersise to control cholesterol.     Schedule mammogram and bone density scanning.     Preventive Health Recommendations  Female Ages 65 +    Yearly exam:     See your health care provider every year in order to  o Review health changes.   o Discuss preventive care.    o Review your medicines if your doctor has prescribed any.      You no longer need a yearly Pap test unless you've had an abnormal Pap test in the past 10 years. If you have vaginal symptoms, such as bleeding or discharge, be sure to talk with your provider about a Pap test.      Every 1 to 2 years, have a mammogram.  If you are over 69, talk with your health care provider about whether or not you want to continue having screening mammograms.      Every 10 years, have a colonoscopy. Or, have a yearly FIT test (stool test). These exams will check for colon cancer.       Have a cholesterol test every 5 years, or more often if your doctor advises it.       Have a diabetes test (fasting glucose) every three years. If you are at risk for diabetes, you should have this test more often.       At age 65, have a bone density scan (DEXA) to check for osteoporosis (brittle bone disease).    Shots:    Get a flu shot each year.    Get a tetanus shot every 10 years.    Talk to your doctor about your pneumonia vaccines. There are now two you should receive - Pneumovax (PPSV 23) and Prevnar (PCV 13).    Talk to your pharmacist about the shingles vaccine.    Talk to your doctor about the hepatitis B vaccine.    Nutrition:     Eat at least 5 servings of fruits and vegetables each day.      Eat whole-grain bread, whole-wheat pasta and brown rice instead of white grains and rice.      Get adequate about Calcium and Vitamin D.     Lifestyle    Exercise at least 150 minutes a week (30 minutes a day, 5 days a week).  This will help you control your weight and prevent disease.      Limit alcohol to one drink per day.      No smoking.       Wear sunscreen to prevent skin cancer.       See your dentist twice a year for an exam and cleaning.      See your eye doctor every 1 to 2 years to screen for conditions such as glaucoma, macular degeneration, cataracts, etc

## 2018-07-11 ENCOUNTER — TRANSFERRED RECORDS (OUTPATIENT)
Dept: HEALTH INFORMATION MANAGEMENT | Facility: CLINIC | Age: 68
End: 2018-07-11

## 2018-07-12 ENCOUNTER — OFFICE VISIT (OUTPATIENT)
Dept: OBGYN | Facility: CLINIC | Age: 68
End: 2018-07-12
Payer: MEDICARE

## 2018-07-12 VITALS
WEIGHT: 135.4 LBS | SYSTOLIC BLOOD PRESSURE: 124 MMHG | HEIGHT: 62 IN | BODY MASS INDEX: 24.92 KG/M2 | DIASTOLIC BLOOD PRESSURE: 62 MMHG

## 2018-07-12 DIAGNOSIS — Z00.00 ROUTINE GENERAL MEDICAL EXAMINATION AT A HEALTH CARE FACILITY: Primary | ICD-10-CM

## 2018-07-12 LAB
SPECIMEN SOURCE: NORMAL
WET PREP SPEC: NORMAL

## 2018-07-12 PROCEDURE — 88175 CYTOPATH C/V AUTO FLUID REDO: CPT | Performed by: FAMILY MEDICINE

## 2018-07-12 PROCEDURE — G0476 HPV COMBO ASSAY CA SCREEN: HCPCS | Performed by: FAMILY MEDICINE

## 2018-07-12 PROCEDURE — 87624 HPV HI-RISK TYP POOLED RSLT: CPT | Performed by: FAMILY MEDICINE

## 2018-07-12 PROCEDURE — 87210 SMEAR WET MOUNT SALINE/INK: CPT | Performed by: FAMILY MEDICINE

## 2018-07-12 PROCEDURE — G0101 CA SCREEN;PELVIC/BREAST EXAM: HCPCS | Performed by: FAMILY MEDICINE

## 2018-07-12 NOTE — MR AVS SNAPSHOT
"              After Visit Summary   7/12/2018    Aaliyah Hayes    MRN: [id number]           Patient Information     Date Of Birth          1950        Visit Information        Provider Department      7/12/2018 2:00 PM Devika Neff, DO [facility]        Today's Diagnoses     Routine general medical examination at a health care facility    -  1      Care Instructions    Return yearly   Will call with results     Dr. Devika Neff, DO    Obstetrics and Gynecology  [facility] [place]                 Follow-ups after your visit        Your next 10 appointments already scheduled     Aug 23, 2018  2:15 PM CDT   MA SCREENING DIGITAL BILATERAL with [id number]   [facility] ([facility])    [address] [facility], [address]  [facility] [address] [phone]           Do not use any powder, lotion or deodorant under your arms or on your breast. If you do, we will ask you to remove it before your exam.  Wear comfortable, two-piece clothing.  If you have any allergies, tell your care team.  Bring any previous mammograms from other facilities or have them mailed to the breast center. Three-dimensional (3D) mammograms are available at [facility] locations in [place], [place], [place], [place], [facility], [place], [place], and [place]. [facility] locations include [place] and [facility] in [place]. Benefits of 3D mammograms include: - Improved rate of cancer detection - Decreases your chance of having to go back for more tests, which means fewer: - \"False-positive\" results (This means that there is an abnormal area but it isn't cancer.) - Invasive testing procedures, such as a biopsy or surgery - Can provide clearer images of the breast if you have dense breast tissue. 3D mammography is an optional exam that anyone can have with a 2D mammogram. It doesn't replace or take the place of a 2D mammogram. 2D mammograms remain " "an effective screening test for all women.  Not all insurance companies cover the cost of a 3D mammogram. Check with your insurance.              Who to contact     If you have questions or need follow up information about today's clinic visit or your schedule please contact [facility] directly at [phone].  Normal or non-critical lab and imaging results will be communicated to you by MyChart, letter or phone within 4 business days after the clinic has received the results. If you do not hear from us within 7 days, please contact the clinic through [employer] or phone. If you have a critical or abnormal lab result, we will notify you by phone as soon as possible.  Submit refill requests through [employer] or call your pharmacy and they will forward the refill request to us. Please allow 3 business days for your refill to be completed.          Additional Information About Your Visit        MyChart Information     [employer] gives you secure access to your electronic health record. If you see a primary care provider, you can also send messages to your care team and make appointments. If you have questions, please call your primary care clinic.  If you do not have a primary care provider, please call [phone] and they will assist you.        Care EveryWhere ID     This is your Care EveryWhere ID. This could be used by other organizations to access your [facility] medical records  [id number]        Your Vitals Were     Height BMI (Body Mass Index)                5' 2\" (1.575 m) 24.76 kg/m2           Blood Pressure from Last 3 Encounters:   07/12/18 124/62   06/26/18 128/64   05/31/18 122/64    Weight from Last 3 Encounters:   07/12/18 135 lb 6.4 oz (61.4 kg)   06/26/18 132 lb 6.4 oz (60.1 kg)   05/31/18 134 lb 11.2 oz (61.1 kg)              Today, you had the following     No orders found for display       Primary Care Provider Office Phone # Fax #    Jessie High [phone] " [phone]       [address] [place] [address]        Equal Access to Services     [facility] : Hadii aad ku hadjaquangabriella Sojosi, waaxda luqadaha, qaybta kathaoda heenasvetasweta, shaq mayenalexyjon traore. So [facility] [phone].    ATENCIÓN: Si habla español, tiene a ocates disposición servicios gratuitos de asistencia lingüística. Llame al [phone].    We comply with applicable federal civil rights laws and [place] laws. We do not discriminate on the basis of race, color, national origin, age, disability, sex, sexual orientation, or gender identity.            Thank you!     Thank you for choosing [facility]  for your care. Our goal is always to provide you with excellent care. Hearing back from our patients is one way we can continue to improve our services. Please take a few minutes to complete the written survey that you may receive in the mail after your visit with us. Thank you!             Your Updated Medication List - Protect others around you: Learn how to safely use, store and throw away your medicines at www.disposemymeds.org.          This list is accurate as of 7/12/18  2:14 PM.  Always use your most recent med list.                   Brand Name Dispense Instructions for use Diagnosis    ASPIRIN EC PO      Take 325 mg by mouth 2 times daily (with meals)        atenolol 25 MG tablet    TENORMIN    90 tablet    TAKE ONE TABLET BY MOUTH ONCE DAILY    Benign essential hypertension       calcium carbonate 1250 (500 Ca) MG Tabs tablet    OSCAL 500     Take 1,250 mg by mouth daily        chlorthalidone 25 MG tablet    HYGROTON     Take 25 mg by mouth daily        ferrous sulfate 142 (45 Fe) MG Tbcr    SLO-FE    60 tablet    Take 1 tablet (142 mg) by mouth 2 times daily    Anemia, unspecified type       lisinopril 10 MG tablet    PRINIVIL/ZESTRIL    90 tablet    Take 1 tablet (10 mg) by mouth daily    Benign essential hypertension       magnesium 250 MG tablet      30 tablet    Take 2 tablets by mouth At Bedtime        Multi-vitamin Tabs tablet     100 tablet    Take 1 tablet by mouth every morning        OMEGA 3 500 500 MG Caps      Take 500 mg by mouth daily        omeprazole 20 MG CR capsule    priLOSEC    90 capsule    Take 1 capsule (20 mg) by mouth daily    Gastroesophageal reflux disease without esophagitis       potassium gluconate 2.5 MEQ Tabs      Take 5 mEq by mouth daily        valACYclovir 1000 mg tablet    VALTREX    30 tablet    Take 2 tablets (2,000 mg) by mouth 2 times daily As needed for herpetic ritesh    Herpetic ritesh       vitamin B complex with vitamin C Tabs tablet      Take 1 tablet by mouth daily        vitamin E 50 UNIT/ML Soln drops      Take by mouth daily

## 2018-07-12 NOTE — NURSING NOTE
"Chief Complaint   Patient presents with     Gyn Exam     pap due--has mammo scheduled 8/23/18       Initial /62  Ht 5' 2\" (1.575 m)  Wt 135 lb 6.4 oz (61.4 kg)  BMI 24.76 kg/m2 Estimated body mass index is 24.76 kg/(m^2) as calculated from the following:    Height as of this encounter: 5' 2\" (1.575 m).    Weight as of this encounter: 135 lb 6.4 oz (61.4 kg).  BP completed using cuff size: regular    No obstetric history on file.    The following HM Due: pap smear      "

## 2018-07-12 NOTE — PATIENT INSTRUCTIONS
Return yearly   Will call with results     Dr. Devika Neff,     Obstetrics and Gynecology  [facility] [place] [facility] [place]

## 2018-07-12 NOTE — PROGRESS NOTES
SUBJECTIVE:  Aaliyah Hayes is an 68 year old No obstetric history on file. postmenopausal woman who presents for annual gyn exam, history of SELENE 1 on endometrial biopsy, repeat pap today. Menopause at age 58. No   bleeding, spotting, or discharge noted. Concerns:  History of     Estrogen replacement therapy:  none currently, has taken in past  RAEGAN exposure: no  History of abnormal Pap smear: Yes: SELENE 1 8/17/17  Family history of uterine or ovarian cancer: No  Regular self breast exam: No  History of abnormal mammogram: No  Family history of breast cancer: No  History of abnormal lipids: No    Past Medical History:   Diagnosis Date     Atypical squamous cells cannot exclude high grade squamous intraepithelial lesion on cytologic smear of cervix (ASC-H) 06/09/2017 06/09/17 ASC-H, Neg HPV, Endometrial cells present. Severy= SELENE 1, EMB= Benign     Facial cellulitis 11/22/2015     HTN (hypertension)      Perforated sigmoid colon (H) 1/6/2017          Family History   Problem Relation Age of Onset     Diabetes Mother 84     Type 2     Hypertension Mother      Alcohol/Drug Father      C.A.D. Paternal Grandmother      Colon Cancer No family hx of        Past Surgical History:   Procedure Laterality Date     APPENDECTOMY  2012     COLECTOMY LEFT N/A 1/6/2017    Procedure: COLECTOMY LEFT;  Surgeon: David Gaitan MD;  Location:  OR     COLONOSCOPY N/A 1/6/2017    Procedure: COLONOSCOPY;  Surgeon: Regis Mckoy MD;  Location:  GI     LAPAROSCOPIC ASSISTED SIGMOID COLECTOMY N/A 1/6/2017    Procedure: LAPAROSCOPIC ASSISTED SIGMOID COLECTOMY;  Surgeon: Daivd Gaitan MD;  Location:  OR     ORTHOPEDIC SURGERY       SURGICAL HISTORY OF -   09/2010    Rotator cuff repair-right shoulder     SURGICAL HISTORY OF -   12/2011    Right hip replacement     SURGICAL HISTORY OF -   2005    ACL and MCL left knee       Current Outpatient Prescriptions   Medication     ASPIRIN EC PO     atenolol (TENORMIN) 25 MG  "tablet     calcium carbonate (OSCAL 500) 1250 (500 CA) MG TABS tablet     chlorthalidone (HYGROTON) 25 MG tablet     ferrous sulfate (SLO-FE) 142 (45 FE) MG TBCR     lisinopril (PRINIVIL/ZESTRIL) 10 MG tablet     magnesium 250 MG tablet     multivitamin, therapeutic with minerals (MULTI-VITAMIN) TABS     Omega-3 Fatty Acids (OMEGA 3 500) 500 MG CAPS     omeprazole (PRILOSEC) 20 MG CR capsule     potassium gluconate 2.5 MEQ TABS     valACYclovir (VALTREX) 1000 mg tablet     vitamin  B complex with vitamin C (VITAMIN  B COMPLEX) TABS     vitamin E 50 UNIT/ML SOLN drops     No current facility-administered medications for this visit.      Allergies   Allergen Reactions     Ciprofloxacin      Facial swelling, tingling on her upper lip, throat slightly closed, racing heart     Oxycodone      Sulfa Drugs      Eye reaction to sulfa-based eye drop       Social History   Substance Use Topics     Smoking status: Never Smoker     Smokeless tobacco: Never Used     Alcohol use 0.0 oz/week     0 Standard drinks or equivalent per week      Comment: 1 glass of wine per night       Review Of Systems  Ears/Nose/Throat: negative  Respiratory: No shortness of breath, dyspnea on exertion, cough, or hemoptysis  Cardiovascular: negative  Gastrointestinal: negative  Genitourinary: negative  Constitutional, HEENT, cardiovascular, pulmonary, GI, , musculoskeletal, neuro, skin, endocrine and psych systems are negative, except as otherwise noted.    OBJECTIVE:  /62  Ht 5' 2\" (1.575 m)  Wt 135 lb 6.4 oz (61.4 kg)  BMI 24.76 kg/m2  General appearance: healthy, alert and no distress  Skin: Skin color, texture, turgor normal. No rashes or lesions.  Ears: negative  Nose/Sinuses: Nares normal. Septum midline. Mucosa normal. No drainage or sinus tenderness.  Lungs: negative, Percussion normal. Good diaphragmatic excursion. Lungs clear  Heart: negative, PMI normal. No lifts, heaves, or thrills. RRR. No murmurs, clicks gallops or " rub  Breasts: done this year   Abdomen: Abdomen soft, non-tender. BS normal. No masses, organomegaly  Pelvic: Pelvic:  Pelvic examination with    pap/ Gonorrhea and Chlamydia   including  External genitalia normal   and vagina normal rugatted not atrophic  Examination of urethra  normal no masses, tenderness, scarring  bladder, no masses or tenderness  Cervix no lesions or discharge  Bimanual exam with   Uterus 7 weeks size, mid position, mobile,non-tenderness, no descent   Adnexa/parametria  Normal     ASSESSMENT:  Aaliyah Hayes is an 68 year old postmenopausal woman who presents for annual gyn exam, history of SELENE 1 on endometrial biopsy, repeat pap today.PLAN:  Dx:  1)  Pap smear, mammogram previously ordered   2)  Lipids at appropriate intervals  3) hx of SELENE 1 and endometrial cells on pap smear, biopsy proven SELENE 1 on endometrial biopsy:  Repeat pap today    PE:  Reviewed health maintenance including diet, regular exercise,   estrogen replacement and periodic exams.    Dr. Devika Neff, DO    Obstetrics and Gynecology  [facility] [place] [facility] [place]

## 2018-07-13 ENCOUNTER — HOSPITAL ENCOUNTER (INPATIENT)
Facility: CLINIC | Age: 68
Setting detail: SURGERY ADMIT
End: 2018-07-13
Attending: ORTHOPAEDIC SURGERY | Admitting: ORTHOPAEDIC SURGERY
Payer: MEDICARE

## 2018-07-16 LAB
COPATH REPORT: NORMAL
PAP: NORMAL

## 2018-07-18 LAB
FINAL DIAGNOSIS: NORMAL
HPV HR 12 DNA CVX QL NAA+PROBE: NEGATIVE
HPV16 DNA SPEC QL NAA+PROBE: NEGATIVE
HPV18 DNA SPEC QL NAA+PROBE: NEGATIVE
SPECIMEN DESCRIPTION: NORMAL
SPECIMEN SOURCE CVX/VAG CYTO: NORMAL

## 2018-08-15 ENCOUNTER — TELEPHONE (OUTPATIENT)
Dept: PEDIATRICS | Facility: CLINIC | Age: 68
End: 2018-08-15

## 2018-08-15 NOTE — TELEPHONE ENCOUNTER
Panel Management Review      Patient has the following on her problem list: None      Composite cancer screening  Chart review shows that this patient is due/due soon for the following Mammogram  Summary:    Patient is due/failing the following:   MAMMOGRAM and PHQ9    Action needed:   Patient needs office visit for mammogram.    Type of outreach:    Phone, left message for patient to call back.     Questions for provider review:    None                                                                                                                                    Haley Jin CMA ([facility])        Chart routed to Care Team .

## 2018-08-23 ENCOUNTER — HOSPITAL ENCOUNTER (OUTPATIENT)
Dept: MAMMOGRAPHY | Facility: CLINIC | Age: 68
Discharge: HOME | End: 2018-08-23
Attending: INTERNAL MEDICINE | Admitting: INTERNAL MEDICINE
Payer: MEDICARE

## 2018-08-23 DIAGNOSIS — Z12.31 VISIT FOR SCREENING MAMMOGRAM: ICD-10-CM

## 2018-08-23 PROCEDURE — 77067 SCR MAMMO BI INCL CAD: CPT

## 2018-08-27 ENCOUNTER — MYC MEDICAL ADVICE (OUTPATIENT)
Dept: PEDIATRICS | Facility: CLINIC | Age: 68
End: 2018-08-27

## 2018-08-27 DIAGNOSIS — I10 BENIGN ESSENTIAL HYPERTENSION: Primary | ICD-10-CM

## 2018-08-27 DIAGNOSIS — I10 BENIGN ESSENTIAL HYPERTENSION: ICD-10-CM

## 2018-08-27 RX ORDER — CHLORTHALIDONE 25 MG/1
25 TABLET ORAL DAILY
Qty: 90 TABLET | Refills: 3 | Status: SHIPPED | OUTPATIENT
Start: 2018-08-27 | End: 2018-11-02

## 2018-08-27 NOTE — TELEPHONE ENCOUNTER
Chlorthalidone 25 mg  Last Written Prescription Date:  07/12/17  Last Fill Quantity: 90,  # refills: 3   Last office visit: 6/26/2018 with prescribing provider:  06/26/18  Per office appointment note- Benign essential hypertension  -- currently controlled; avg in 120/80   -- advised patient she can stop chorthalidone  --had problem with significant tachycardia coming off b blocker in past, continue to wean dose and the stop   -- continue to check BP, advised patient to contact me if BP increase   -- if BPs increase will increase lisinopril   Future Office Visit:      Routing refill request to provider for review/approval because:  A break in medication  Patient will bring her blood pressure readings to clinic for review.  ZACHARY Alvares RN

## 2018-08-27 NOTE — TELEPHONE ENCOUNTER
"Requested Prescriptions   Pending Prescriptions Disp Refills     chlorthalidone (HYGROTON) 25 MG tablet [Pharmacy Med Name: CHLORTHALIDONE 25MG    TAB] 90 tablet 3    Last Written Prescription Date:  7/12/17  Last Fill Quantity: 90,  # refills: 3   Last office visit: 6/26/2018 with prescribing provider:  Lennox   Future Office Visit:  None scheduled Sig: TAKE ONE TABLET BY MOUTH ONCE DAILY    Diuretics (Including Combos) Protocol Passed    8/27/2018  9:39 AM       Passed - Blood pressure under 140/90 in past 12 months    BP Readings from Last 3 Encounters:   07/12/18 124/62   06/26/18 128/64   05/31/18 122/64                Passed - Recent (12 mo) or future (30 days) visit within the authorizing provider's specialty    Patient had office visit in the last 12 months or has a visit in the next 30 days with authorizing provider or within the authorizing provider's specialty.  See \"Patient Info\" tab in inbasket, or \"Choose Columns\" in Meds & Orders section of the refill encounter.           Passed - Patient is age 18 or older       Passed - No active pregancy on record       Passed - Normal serum creatinine on file in past 12 months    Recent Labs   Lab Test  06/23/18   0939   CR  0.69             Passed - Normal serum potassium on file in past 12 months    Recent Labs   Lab Test  06/23/18   0939   POTASSIUM  4.0                   Passed - Normal serum sodium on file in past 12 months    Recent Labs   Lab Test  06/23/18   0939   NA  135             Passed - No positive pregnancy test in past 12 months          "

## 2018-08-28 RX ORDER — CHLORTHALIDONE 25 MG/1
TABLET ORAL
Qty: 90 TABLET | Refills: 3 | Status: SHIPPED | OUTPATIENT
Start: 2018-08-28 | End: 2019-08-27

## 2018-08-28 NOTE — TELEPHONE ENCOUNTER
Prescription approved per [facility], [facility] or [facility] refill protocol.  Jacki Lawrence RN - Triage  [facility]

## 2018-09-12 ENCOUNTER — TRANSFERRED RECORDS (OUTPATIENT)
Dept: HEALTH INFORMATION MANAGEMENT | Facility: CLINIC | Age: 68
End: 2018-09-12

## 2018-09-20 ENCOUNTER — TRANSFERRED RECORDS (OUTPATIENT)
Dept: HEALTH INFORMATION MANAGEMENT | Facility: CLINIC | Age: 68
End: 2018-09-20

## 2018-09-25 ENCOUNTER — TELEPHONE (OUTPATIENT)
Dept: PEDIATRICS | Facility: CLINIC | Age: 68
End: 2018-09-25

## 2018-09-25 NOTE — TELEPHONE ENCOUNTER
We will, but generally we just do that at the preop visit.  Her last hemoglobin was normal.    Jessie High MD

## 2018-09-25 NOTE — TELEPHONE ENCOUNTER
"Patient has a pre-op scheduled for 11/2 for total shoulder replacement surgery and she sent the following message through [employer] wondering about lab work. I asked her through [employer] if her surgeon had requested for her to have labs done and this was her response:    \"No he didn't but a year ago prior to another surgery that was done Sept. '17, it was discovered that I had anemia issues as well as hemoglobin problems so at my Wellness check up in June  Dr. High  and I discussed checking these two things again but we had to wait until it became a total year that had passed. I was just wondering if she wanted to look at this prior to this upcoming surgery.\"    Patient would like us to contact her with response.  "

## 2018-09-26 NOTE — TELEPHONE ENCOUNTER
Patient notified of this recommendation.  No further questions.  Will call back if any other questions or concerns.  ZACHARY Alvares RN

## 2018-10-11 ENCOUNTER — HOSPITAL ENCOUNTER (EMERGENCY)
Facility: CLINIC | Age: 68
Discharge: HOME | End: 2018-10-11
Attending: EMERGENCY MEDICINE | Admitting: EMERGENCY MEDICINE
Payer: MEDICARE

## 2018-10-11 ENCOUNTER — APPOINTMENT (OUTPATIENT)
Dept: GENERAL RADIOLOGY | Facility: CLINIC | Age: 68
End: 2018-10-11
Attending: EMERGENCY MEDICINE
Payer: MEDICARE

## 2018-10-11 VITALS
DIASTOLIC BLOOD PRESSURE: 75 MMHG | RESPIRATION RATE: 8 BRPM | OXYGEN SATURATION: 98 % | SYSTOLIC BLOOD PRESSURE: 140 MMHG | TEMPERATURE: 97.9 F

## 2018-10-11 DIAGNOSIS — I10 BENIGN ESSENTIAL HYPERTENSION: ICD-10-CM

## 2018-10-11 DIAGNOSIS — R42 DIZZINESS: ICD-10-CM

## 2018-10-11 LAB
ANION GAP SERPL CALCULATED.3IONS-SCNC: 9 MMOL/L (ref 3–14)
BASOPHILS # BLD AUTO: 0.1 10E9/L (ref 0–0.2)
BASOPHILS NFR BLD AUTO: 0.7 %
BUN SERPL-MCNC: 13 MG/DL (ref 7–30)
CALCIUM SERPL-MCNC: 9.5 MG/DL (ref 8.5–10.1)
CHLORIDE SERPL-SCNC: 97 MMOL/L (ref 94–109)
CO2 SERPL-SCNC: 27 MMOL/L (ref 20–32)
CREAT SERPL-MCNC: 0.69 MG/DL (ref 0.52–1.04)
D DIMER PPP FEU-MCNC: 0.3 UG/ML FEU (ref 0–0.5)
DIFFERENTIAL METHOD BLD: ABNORMAL
EOSINOPHIL # BLD AUTO: 0.3 10E9/L (ref 0–0.7)
EOSINOPHIL NFR BLD AUTO: 3.4 %
ERYTHROCYTE [DISTWIDTH] IN BLOOD BY AUTOMATED COUNT: 12 % (ref 10–15)
GFR SERPL CREATININE-BSD FRML MDRD: 85 ML/MIN/1.7M2
GLUCOSE SERPL-MCNC: 108 MG/DL (ref 70–99)
HCT VFR BLD AUTO: 41.2 % (ref 35–47)
HGB BLD-MCNC: 14.8 G/DL (ref 11.7–15.7)
IMM GRANULOCYTES # BLD: 0 10E9/L (ref 0–0.4)
IMM GRANULOCYTES NFR BLD: 0.3 %
INTERPRETATION ECG - MUSE: NORMAL
LYMPHOCYTES # BLD AUTO: 2.1 10E9/L (ref 0.8–5.3)
LYMPHOCYTES NFR BLD AUTO: 21.8 %
MCH RBC QN AUTO: 34.3 PG (ref 26.5–33)
MCHC RBC AUTO-ENTMCNC: 35.9 G/DL (ref 31.5–36.5)
MCV RBC AUTO: 96 FL (ref 78–100)
MONOCYTES # BLD AUTO: 1.2 10E9/L (ref 0–1.3)
MONOCYTES NFR BLD AUTO: 12.2 %
NEUTROPHILS # BLD AUTO: 6.1 10E9/L (ref 1.6–8.3)
NEUTROPHILS NFR BLD AUTO: 61.6 %
NRBC # BLD AUTO: 0 10*3/UL
NRBC BLD AUTO-RTO: 0 /100
PLATELET # BLD AUTO: 317 10E9/L (ref 150–450)
POTASSIUM SERPL-SCNC: 3.5 MMOL/L (ref 3.4–5.3)
RBC # BLD AUTO: 4.31 10E12/L (ref 3.8–5.2)
SODIUM SERPL-SCNC: 133 MMOL/L (ref 133–144)
TROPONIN I SERPL-MCNC: <0.015 UG/L (ref 0–0.04)
WBC # BLD AUTO: 9.8 10E9/L (ref 4–11)

## 2018-10-11 PROCEDURE — 85379 FIBRIN DEGRADATION QUANT: CPT | Performed by: EMERGENCY MEDICINE

## 2018-10-11 PROCEDURE — 99285 EMERGENCY DEPT VISIT HI MDM: CPT | Mod: 25

## 2018-10-11 PROCEDURE — 96361 HYDRATE IV INFUSION ADD-ON: CPT

## 2018-10-11 PROCEDURE — 93005 ELECTROCARDIOGRAM TRACING: CPT

## 2018-10-11 PROCEDURE — 25000128 H RX IP 250 OP 636: Performed by: EMERGENCY MEDICINE

## 2018-10-11 PROCEDURE — 71046 X-RAY EXAM CHEST 2 VIEWS: CPT

## 2018-10-11 PROCEDURE — 84484 ASSAY OF TROPONIN QUANT: CPT | Performed by: EMERGENCY MEDICINE

## 2018-10-11 PROCEDURE — 85025 COMPLETE CBC W/AUTO DIFF WBC: CPT | Performed by: EMERGENCY MEDICINE

## 2018-10-11 PROCEDURE — 80048 BASIC METABOLIC PNL TOTAL CA: CPT | Performed by: EMERGENCY MEDICINE

## 2018-10-11 PROCEDURE — 96360 HYDRATION IV INFUSION INIT: CPT

## 2018-10-11 RX ORDER — ATENOLOL 25 MG/1
25 TABLET ORAL DAILY
Qty: 90 TABLET | Refills: 0 | Status: SHIPPED | OUTPATIENT
Start: 2018-10-11 | End: 2018-10-11

## 2018-10-11 RX ORDER — ATENOLOL 25 MG/1
12.5 TABLET ORAL DAILY
Qty: 90 TABLET | Refills: 0 | Status: SHIPPED | OUTPATIENT
Start: 2018-10-11 | End: 2019-01-02

## 2018-10-11 RX ORDER — SODIUM CHLORIDE 9 MG/ML
1000 INJECTION, SOLUTION INTRAVENOUS CONTINUOUS
Status: DISCONTINUED | OUTPATIENT
Start: 2018-10-11 | End: 2018-10-11 | Stop reason: HOSPADM

## 2018-10-11 RX ADMIN — SODIUM CHLORIDE 1000 ML: 9 INJECTION, SOLUTION INTRAVENOUS at 09:12

## 2018-10-11 ASSESSMENT — ENCOUNTER SYMPTOMS
FEVER: 0
PALPITATIONS: 1
FATIGUE: 1
DIZZINESS: 1
COUGH: 1
LIGHT-HEADEDNESS: 1
HEADACHES: 1

## 2018-10-11 NOTE — ED AVS SNAPSHOT
[facility]    [address]    [facility] [address]    Phone:  [phone]    Fax:  [phone]                                       Aaliyah Hayes   MRN: [id number]    Department:  [facility]   Date of Visit:  10/11/2018           After Visit Summary Signature Page     I have received my discharge instructions, and my questions have been answered. I have discussed any challenges I see with this plan with the nurse or doctor.    ..........................................................................................................................................  Patient/Patient Representative Signature      ..........................................................................................................................................  Patient Representative Print Name and Relationship to Patient    ..................................................               ................................................  Date                                   Time    ..........................................................................................................................................  Reviewed by Signature/Title    ...................................................              ..............................................  Date                                               Time          22EPIC Rev 08/18

## 2018-10-11 NOTE — ED AVS SNAPSHOT
[facility]    [address] [place] [address]    Phone:  [phone]    Fax:  [phone]                                       Aaliyah Hayes   MRN: [id number]    Department:  [facility]   Date of Visit:  10/11/2018           Patient Information     Date Of Birth          1950        Your diagnoses for this visit were:     Benign essential hypertension     Dizziness        You were seen by Elizabeth Mcgowan MD.        Discharge Instructions       Discharge Instructions  Hypertension - High Blood Pressure    During you visit to the Emergency Department, your blood pressure was higher than the recommended blood pressure.  This may be related to stress, pain, medication or other temporary conditions. In these cases, your blood pressure may return to normal on its own. If you have a history of high blood pressure, you may need to have your provider adjust your medications. Sometimes, your high measurement here may indicate that you have developed high blood pressure that will stay high unless it is treated. As a general rule, high blood pressure causes problems over years rather than days, weeks, or months. So, while it is important to treat blood pressure, it is rarely important to treat blood pressure immediately. Occasionally we will begin a medication in the Emergency Department; more often we will recommend close follow-up for medications with a primary doctor/clinic.    Generally, every Emergency Department visit should have a follow-up clinic visit with either a primary or a specialty clinic/provider. Please follow-up as instructed by your emergency provider today.    Return to the Emergency Department if you start to have:    A severe headache.    Chest pain.    Shortness of breath.    Weakness or numbness that affects one part of the body.    Confusion.    Vision changes.    Significant swelling of legs and/or  eyes.    A reaction to any medication started in the Emergency Department.    What can I do to help myself?    Avoid alcohol.    Take any blood pressure medicine that you are prescribed.    Get a good night s sleep.    Lower your salt intake.    Exercise.    Lose weight.    Manage stress.    See your doctor regularly    If blood pressure medication was started in the Emergency Department:    The medicine may not have an immediate effect. The body and brain determine what blood pressure you have. The medicine s job is to retrain the body s  thermostat  to a lower blood pressure.    You will need to follow up with your provider to see how this medicine is working for you.  If you were given a prescription for medicine here today, be sure to read all of the information (including the package insert) that comes with your prescription.  This will include important information about the medicine, its side effects, and any warnings that you need to know about.  The pharmacist who fills the prescription can provide more information and answer questions you may have about the medicine.  If you have questions or concerns that the pharmacist cannot address, please call or return to the Emergency Department.   Remember that you can always come back to the Emergency Department if you are not able to see your regular provider in the amount of time listed above, if you get any new symptoms, or if there is anything that worries you.  Discharge Instructions  Dizziness (Lightheaded)  Today you were seen for dizziness.  Dizziness can be caused by many things and it can be very difficult to determine the cause of dizziness.  At this time, your provider has found no signs that your dizziness is due to a serious or life-threatening condition. However, sometimes there is a serious problem that does not show up right away, and it is important for you to follow up with your regular provider as instructed.  Generally, every Emergency  Department visit should have a follow-up clinic visit with either a primary or a specialty clinic/provider. Please follow-up as instructed by your emergency provider today.      Return to the Emergency Department if:      You pass out (fainting or falling out), especially during exercise.      You develop chest pain, chest pressure or difficulty breathing.    Your feel an irregular heartbeat.    You have excessive vaginal bleeding, or blood in your stool or vomit (throw up).    You have a high fever.    Your symptoms get worse or more frequent.    If when you begin to feel dizzy or lightheaded, it is important to sit down or lay down immediately to prevent injury from falling.  If you were given a prescription for medicine here today, be sure to read all of the information (including the package insert) that comes with your prescription.  This will include important information about the medicine, its side effects, and any warnings that you need to know about.  The pharmacist who fills the prescription can provide more information and answer questions you may have about the medicine.  If you have questions or concerns that the pharmacist cannot address, please call or return to the Emergency Department.   Remember that you can always come back to the Emergency Department if you are not able to see your regular provider in the amount of time listed above, if you get any new symptoms, or if there is anything that worries you.      Your next 10 appointments already scheduled     Nov 02, 2018 11:00 AM CDT   [facility]    [address]  [address]  [facility] [address]   [phone]            Nov 16, 2018   Procedure with Summer Neil MD   [facility] (--)    [address]  [facility] [address] [phone]            Nov 20, 2018   Procedure with Cricket Shah MD   [place]  [facility] (--)    [address]  [facility] [address]   [phone]              24 Hour Appointment Hotline       To make an appointment at any [facility], call [phone] ([phone]). If you don't have a family doctor or clinic, we will help you find one. [place] clinics are conveniently located to serve the needs of you and your family.             Review of your medicines      CONTINUE these medicines which may have CHANGED, or have new prescriptions. If we are uncertain of the size of tablets/capsules you have at home, strength may be listed as something that might have changed.        Dose / Directions Last dose taken    atenolol 25 MG tablet   Commonly known as:  TENORMIN   Dose:  12.5 mg   What changed:  See the new instructions.   Quantity:  90 tablet        Take 0.5 tablets (12.5 mg) by mouth daily   Refills:  0          Our records show that you are taking the medicines listed below. If these are incorrect, please call your family doctor or clinic.        Dose / Directions Last dose taken    * ASPIRIN EC PO   Dose:  325 mg        Take 325 mg by mouth 2 times daily (with meals)   Refills:  0        * ASPIRIN ADULT LOW STRENGTH PO   Dose:  81 mg        Take 81 mg by mouth   Refills:  0        calcium carbonate 500 mg (elemental) 1250 (500 Ca) MG Tabs tablet   Commonly known as:  OSCAL 500   Dose:  1250 mg        Take 1,250 mg by mouth daily   Refills:  0        * chlorthalidone 25 MG tablet   Commonly known as:  HYGROTON   Dose:  25 mg   Quantity:  90 tablet        Take 1 tablet (25 mg) by mouth daily   Refills:  3        * chlorthalidone 25 MG tablet   Commonly known as:  HYGROTON   Quantity:  90 tablet        TAKE ONE TABLET BY MOUTH ONCE DAILY   Refills:  3        ferrous sulfate 142 (45 Fe) MG Tbcr   Commonly known as:  SLO-FE   Dose:  142 mg   Quantity:  60 tablet        Take 1 tablet (142 mg) by mouth 2 times daily   Refills:  1        lisinopril 10 MG tablet    Commonly known as:  PRINIVIL/ZESTRIL   Dose:  10 mg   Quantity:  90 tablet        Take 1 tablet (10 mg) by mouth daily   Refills:  3        magnesium 250 MG tablet   Dose:  2 tablet   Quantity:  30 tablet        Take 2 tablets by mouth At Bedtime   Refills:  0        Multi-vitamin Tabs tablet   Dose:  1 tablet   Quantity:  100 tablet        Take 1 tablet by mouth every morning   Refills:  3        OMEGA 3 500 500 MG Caps   Dose:  500 mg        Take 500 mg by mouth daily   Refills:  0        omeprazole 20 MG CR capsule   Commonly known as:  priLOSEC   Dose:  20 mg   Quantity:  90 capsule        Take 1 capsule (20 mg) by mouth daily   Refills:  3        potassium gluconate 2.5 MEQ Tabs   Dose:  5 mEq        Take 5 mEq by mouth daily   Refills:  0        valACYclovir 1000 mg tablet   Commonly known as:  VALTREX   Dose:  2000 mg   Quantity:  30 tablet        Take 2 tablets (2,000 mg) by mouth 2 times daily As needed for herpetic ritesh   Refills:  3        vitamin B complex with vitamin C Tabs tablet   Dose:  1 tablet        Take 1 tablet by mouth daily   Refills:  0        vitamin E 50 UNIT/ML Soln drops        Take by mouth daily   Refills:  0        * Notice:  This list has 4 medication(s) that are the same as other medications prescribed for you. Read the directions carefully, and ask your doctor or other care provider to review them with you.            Prescriptions were sent or printed at these locations (1 Prescription)                   Other Prescriptions                Printed at Department/Unit printer (1 of 1)         atenolol (TENORMIN) 25 MG tablet                Procedures and tests performed during your visit     Basic metabolic panel    CBC with platelets differential    D dimer quantitative    EKG 12 lead    Troponin I    XR Chest 2 Views      Orders Needing Specimen Collection     None      Pending Results     No orders found from 10/9/2018 to 10/12/2018.            Pending Culture Results     No  orders found from 10/9/2018 to 10/12/2018.            Pending Results Instructions     If you had any lab results that were not finalized at the time of your Discharge, you can call the ED Lab Result RN at [phone]. You will be contacted by this team for any positive Lab results or changes in treatment. The nurses are available 7 days a week from 10A to 6:30P.  You can leave a message 24 hours per day and they will return your call.        Test Results From Your Hospital Stay        10/11/2018  9:12 AM      Component Results     Component Value Ref Range & Units Status    WBC 9.8 4.0 - 11.0 10e9/L Final    RBC Count 4.31 3.8 - 5.2 10e12/L Final    Hemoglobin 14.8 11.7 - 15.7 g/dL Final    Hematocrit 41.2 35.0 - 47.0 % Final    MCV 96 78 - 100 fl Final    MCH 34.3 (H) 26.5 - 33.0 pg Final    MCHC 35.9 31.5 - 36.5 g/dL Final    RDW 12.0 10.0 - 15.0 % Final    Platelet Count 317 150 - 450 10e9/L Final    Diff Method Automated Method  Final    % Neutrophils 61.6 % Final    % Lymphocytes 21.8 % Final    % Monocytes 12.2 % Final    % Eosinophils 3.4 % Final    % Basophils 0.7 % Final    % Immature Granulocytes 0.3 % Final    Nucleated RBCs 0 0 /100 Final    Absolute Neutrophil 6.1 1.6 - 8.3 10e9/L Final    Absolute Lymphocytes 2.1 0.8 - 5.3 10e9/L Final    Absolute Monocytes 1.2 0.0 - 1.3 10e9/L Final    Absolute Eosinophils 0.3 0.0 - 0.7 10e9/L Final    Absolute Basophils 0.1 0.0 - 0.2 10e9/L Final    Abs Immature Granulocytes 0.0 0 - 0.4 10e9/L Final    Absolute Nucleated RBC 0.0  Final         10/11/2018  9:35 AM      Component Results     Component Value Ref Range & Units Status    Sodium 133 133 - 144 mmol/L Final    Potassium 3.5 3.4 - 5.3 mmol/L Final    Specimen slightly hemolyzed, potassium may be falsely elevated    Chloride 97 94 - 109 mmol/L Final    Carbon Dioxide 27 20 - 32 mmol/L Final    Anion Gap 9 3 - 14 mmol/L Final    Glucose 108 (H) 70 - 99 mg/dL Final    Urea Nitrogen 13 7 - 30 mg/dL Final     Creatinine 0.69 0.52 - 1.04 mg/dL Final    GFR Estimate 85 >60 mL/min/1.7m2 Final    Non  GFR Calc    GFR Estimate If Black >90 >60 mL/min/1.7m2 Final    African American GFR Calc    Calcium 9.5 8.5 - 10.1 mg/dL Final         10/11/2018  9:35 AM      Component Results     Component Value Ref Range & Units Status    Troponin I ES <0.015 0.000 - 0.045 ug/L Final    The 99th percentile for upper reference range is 0.045 ug/L.  Troponin values   in the range of 0.045 - 0.120 ug/L may be associated with risks of adverse   clinical events.           10/11/2018 10:06 AM      Narrative     CHEST TWO VIEWS  10/11/2018 9:45 AM     HISTORY:  Cough.     COMPARISON: 2/5/2016        Impression     IMPRESSION: Lungs are well inflated and clear. Heart size is normal.    GA ELLISON MD         10/11/2018  9:25 AM      Component Results     Component Value Ref Range & Units Status    D Dimer 0.3 0.0 - 0.50 ug/ml FEU Final    This D-dimer assay is intended for use in conjunction with a clinical pretest   probability assessment model to exclude pulmonary embolism (PE) and deep   venous thrombosis (DVT) in outpatients suspected of PE or DVT. The cut-off   value is 0.5 ug/mL FEU.                  Clinical Quality Measure: Blood Pressure Screening     Your blood pressure was checked while you were in the emergency department today. The last reading we obtained was  BP: 134/78 . Please read the guidelines below about what these numbers mean and what you should do about them.  If your systolic blood pressure (the top number) is less than 120 and your diastolic blood pressure (the bottom number) is less than 80, then your blood pressure is normal. There is nothing more that you need to do about it.  If your systolic blood pressure (the top number) is 120-139 or your diastolic blood pressure (the bottom number) is 80-89, your blood pressure may be higher than it should be. You should have your blood pressure rechecked  within a year by a primary care provider.  If your systolic blood pressure (the top number) is 140 or greater or your diastolic blood pressure (the bottom number) is 90 or greater, you may have high blood pressure. High blood pressure is treatable, but if left untreated over time it can put you at risk for heart attack, stroke, or kidney failure. You should have your blood pressure rechecked by a primary care provider within the next 4 weeks.  If your provider in the emergency department today gave you specific instructions to follow-up with your doctor or provider even sooner than that, you should follow that instruction and not wait for up to 4 weeks for your follow-up visit.        Thank you for choosing [facility]       Thank you for choosing [facility] for your care. Our goal is always to provide you with excellent care. Hearing back from our patients is one way we can continue to improve our services. Please take a few minutes to complete the written survey that you may receive in the mail after you visit with us. Thank you!        [employer] Information     [employer] gives you secure access to your electronic health record. If you see a primary care provider, you can also send messages to your care team and make appointments. If you have questions, please call your primary care clinic.  If you do not have a primary care provider, please call [phone] and they will assist you.        Care EveryWhere ID     This is your Care EveryWhere ID. This could be used by other organizations to access your [facility] medical records  [id number]        Equal Access to Services     NEELA FERNANDO : Hadii darrius Ware, joyce scott, qashaq gonzalez. So [facility] [phone].    ATENCIÓN: Si habla español, tiene a coates disposición servicios gratuitos de asistencia lingüística. Jared al [phone].    We comply with applicable federal civil rights laws and [place] laws.  We do not discriminate on the basis of race, color, national origin, age, disability, sex, sexual orientation, or gender identity.            After Visit Summary       This is your record. Keep this with you and show to your community pharmacist(s) and doctor(s) at your next visit.

## 2018-10-11 NOTE — ED PROVIDER NOTES
"  History     Chief Complaint:  Dizziness    HPI   Aaliyah Hayes is a 68 year old female, nonsmoker with a history of hypertension, who presents to the emergency department today for evaluation of dizziness. Patient describes a extensive time being prescribed on Lisinopril and Atenolol while working but reports retiring January of this year. Since then, patient has been working closely with her PCP to taper off atenolol prescription with dosage at a quarter of 25 mg Atenolol several weeks ago and asserts she stopped taking it approximately two weeks ago. For the past two weeks, she has felt daily occurences of palpitations with associated near-syncopal symptoms like dizziness and lightheadedness that is worsened when standing up, along with fatigue of which she attributes to being off atenolol. In addition, patient also notes a cold for the past week has been worsening her symptoms attributed to being off atenolol. Earlier today, patient states prior to departure for a flight to [place], she started having severe lightheadedness like she was going to \"faint\" and headache at which time patient measured her blood pressure which was at 185/118. Patient also reports a greenish mucous productive cough for the past week that has been clearing up. Patient denies any chest pain, fever, or personal history of heart complications. Of note, patient underwent a stress test two years ago with no remarkable findings.     Allergies:  Ciproflxacin  Oxycodone  Sulfa drugs     Medications:    Tenormin  Hygroton  Prinivil  Prilosec  Valtrex    Past Medical History:    ASC-H  Facial cellulitis  Hypertension  Osteoarthritis   Hyperlipidemia   Perforated sigmoid colon   Insomnia    Past Surgical History:    Appendectomy  Left colectomy   Rotator cuff repair of right shoulder  Right hip replacement  ACL and MCL of left knee surgeries     Family History:    Mother: Type 2 diabetes, hypertension  Father: Alcohol/drug abuse  Paternal " grandmother: CAD  No family history of colon cancer     Social History:  Smoking Status: Never Smoker  Smokeless Tobacco: Never Used  Alcohol Use: Positive  Marital Status:      Review of Systems   Constitutional: Positive for fatigue. Negative for fever.   Respiratory: Positive for cough.    Cardiovascular: Positive for palpitations. Negative for chest pain.   Neurological: Positive for dizziness, light-headedness and headaches.   All other systems reviewed and are negative.    Physical Exam     Patient Vitals for the past 24 hrs:   BP Temp Temp src Heart Rate Resp SpO2   10/11/18 1115 134/78 - - 105 23 (!) 72 %   10/11/18 1100 130/73 - - 91 10 99 %   10/11/18 1045 136/81 - - 99 10 100 %   10/11/18 1030 135/88 - - 102 17 -   10/11/18 1015 133/85 - - 90 16 100 %   10/11/18 1000 135/79 - - - 13 99 %   10/11/18 0949 - - - 89 16 94 %   10/11/18 0948 - - - 84 8 100 %   10/11/18 0947 - - - 103 18 98 %   10/11/18 0946 124/90 - - - 20 93 %   10/11/18 0945 - - - - 21 (!) 82 %   10/11/18 0934 - - - 93 20 -   10/11/18 0933 - - - 95 12 100 %   10/11/18 0932 - - - - - 98 %   10/11/18 0931 - - - 90 13 98 %   10/11/18 0930 132/72 - - 90 9 98 %   10/11/18 0929 - - - 90 12 99 %   10/11/18 0928 - - - 92 (!) 7 98 %   10/11/18 0927 - - - 96 9 99 %   10/11/18 0926 - - - 92 (!) 7 100 %   10/11/18 0925 - - - 102 13 97 %   10/11/18 0924 - - - 98 16 98 %   10/11/18 0923 - - - - 25 96 %   10/11/18 0922 - - - - 16 90 %   10/11/18 0921 - - - 109 (!) 34 93 %   10/11/18 0920 - - - 110 23 100 %   10/11/18 0919 - - - 109 25 99 %   10/11/18 0918 - - - 105 18 99 %   10/11/18 0917 - - - 93 12 97 %   10/11/18 0916 - - - 93 11 97 %   10/11/18 0915 142/79 - - 93 10 96 %   10/11/18 0914 - - - 92 10 100 %   10/11/18 0913 - - - 103 16 98 %   10/11/18 0912 (!) 129/97 - - 103 23 100 %   10/11/18 0911 143/90 - - 103 14 100 %   10/11/18 0910 - - - 94 13 94 %   10/11/18 0909 130/78 - - 93 10 98 %   10/11/18 0908 - - - 94 8 98 %   10/11/18 0907 - - -  101 8 99 %   10/11/18 0906 - - - 101 10 99 %   10/11/18 0905 - - - 101 15 100 %   10/11/18 0904 - - - 111 10 97 %   10/11/18 0903 - - - 96 8 98 %   10/11/18 0902 - - - 97 12 98 %   10/11/18 0901 - - - 101 15 99 %   10/11/18 0900 - - - 104 16 100 %   10/11/18 0859 151/85 - - - - 92 %   10/11/18 0858 - - - - - 100 %   10/11/18 0857 - - - - - 99 %   10/11/18 0856 - - - - - 100 %   10/11/18 0855 - - - - - 99 %   10/11/18 0854 (!) 167/94 97.9  F (36.6  C) Oral 104 20 99 %   10/11/18 0853 - - - - - 99 %   10/11/18 0852 - - - - - 100 %   10/11/18 0851 - - - - - 98 %   10/11/18 0850 - - - - - 99 %   10/11/18 0849 (!) 167/94 - - - - (!) 89 %     Physical Exam  General: Patient is alert and interactive when I enter the room  Head:  The scalp, face, and head appear normal  Eyes:  Conjunctivae are normal, PERRL   ENT:    The nose is normal    Pinnae are normal    External acoustic canals are normal  Neck:  Trachea midline, good ROM  CV:  Pulses are normal, RRR. No murmurs.   Resp:  No respiratory distress, CTAB   Abdomen:      Soft, non-tender, non-distended  Musc:  Normal muscular tone    No major joint effusions    No asymmetric leg swelling  Skin:  No rash or lesions noted  Neuro: Speech is normal and fluent. Face is symmetric.     Moving all extremities well. Gait intact.   Psych:  Awake. Alert.  Normal affect.  Appropriate interactions.    Emergency Department Course     ECG:  ECG taken at 0850, ECG read at 0853  Sinus tachycardia  Left axis deviation  Abnormal ECG  Rate 113 bpm. HI interval 160 ms. QRS duration 72 ms. QT/QTc 326/447 ms. P-R-T axes 58 -35 21.    Imaging:  Radiology findings were communicated with the patient who voiced understanding of the findings.    XR Chest 2 Views  Lungs are well inflated and clear. Heart size is normal.  GA ELLISON MD  Reading per radiology    Laboratory:  Laboratory findings were communicated with the patient who voiced understanding of the findings.    CBC: WBC 9.8, HGB 14.8,    BMP: Glucose 108 (H) o/w WNL (Creatinine 0.69)  Troponin (Collected 0856): <0.015  D dimer: 0.3    Interventions:  0912 NS 1000 mL IV     Emergency Department Course:    0850 Nursing notes and vitals reviewed.    0856 I performed an exam of the patient as documented above.     0856 IV was inserted and blood was drawn for laboratory testing, results above.    0928 The patient was sent for a x-ray while in the emergency department, results above.      I personally reviewed the imaging and laboratory results with the patient and answered all related questions prior to discharge.    Impression & Plan      Medical Decision Making:  Aaliyah Hayes is a 68 year old female with a history of hypertension who presents with tachycardia, hypertension, and dizziness. Patient report her symptoms started after she stopped the atenolol and is requesting to be back on it. She was mildly tachycardia here and mildly hypertensive but otherwise unremarkable. Her exam was normal. With her cough, we did do a chest x-ray to make sure she has no pneumothorax or pneumonia, this was normal. Her troponin was negative. Her d dimer was negative which was obtained given her palpitations and tachycardiac. Her EKG revealed sinus tachycardiac with no signs of arhythmia or ischemic changes. Patient was given IV fluids and she was ambulatory without any symptoms. Patient requested a refill of her atenolol so she was given atenolol to take 12.5 daily. She should otherwise follow up with her primary care doctor. Patient discharged.     Diagnosis:    ICD-10-CM   1. Benign essential hypertension I10   2. Dizziness R42     Disposition:   The patient is discharged to home.    Discharge Medications:  Atenolol 12.5 mg daily     Scribe Disclosure:  Abhinav ALEXIS, am serving as a scribe at 8:50 AM on 10/11/2018 to document services personally performed by Elizabeth Mcgowan MD based on my observations and the provider's statements to me.    [place]  [facility] EMERGENCY DEPARTMENT       Elizabeth Mcgowan MD  10/12/18 6734

## 2018-10-11 NOTE — DISCHARGE INSTRUCTIONS
Discharge Instructions  Hypertension - High Blood Pressure    During you visit to the Emergency Department, your blood pressure was higher than the recommended blood pressure.  This may be related to stress, pain, medication or other temporary conditions. In these cases, your blood pressure may return to normal on its own. If you have a history of high blood pressure, you may need to have your provider adjust your medications. Sometimes, your high measurement here may indicate that you have developed high blood pressure that will stay high unless it is treated. As a general rule, high blood pressure causes problems over years rather than days, weeks, or months. So, while it is important to treat blood pressure, it is rarely important to treat blood pressure immediately. Occasionally we will begin a medication in the Emergency Department; more often we will recommend close follow-up for medications with a primary doctor/clinic.    Generally, every Emergency Department visit should have a follow-up clinic visit with either a primary or a specialty clinic/provider. Please follow-up as instructed by your emergency provider today.    Return to the Emergency Department if you start to have:    A severe headache.    Chest pain.    Shortness of breath.    Weakness or numbness that affects one part of the body.    Confusion.    Vision changes.    Significant swelling of legs and/or eyes.    A reaction to any medication started in the Emergency Department.    What can I do to help myself?    Avoid alcohol.    Take any blood pressure medicine that you are prescribed.    Get a good night s sleep.    Lower your salt intake.    Exercise.    Lose weight.    Manage stress.    See your doctor regularly    If blood pressure medication was started in the Emergency Department:    The medicine may not have an immediate effect. The body and brain determine what blood pressure you have. The medicine s job is to retrain the body s   thermostat  to a lower blood pressure.    You will need to follow up with your provider to see how this medicine is working for you.  If you were given a prescription for medicine here today, be sure to read all of the information (including the package insert) that comes with your prescription.  This will include important information about the medicine, its side effects, and any warnings that you need to know about.  The pharmacist who fills the prescription can provide more information and answer questions you may have about the medicine.  If you have questions or concerns that the pharmacist cannot address, please call or return to the Emergency Department.   Remember that you can always come back to the Emergency Department if you are not able to see your regular provider in the amount of time listed above, if you get any new symptoms, or if there is anything that worries you.  Discharge Instructions  Dizziness (Lightheaded)  Today you were seen for dizziness.  Dizziness can be caused by many things and it can be very difficult to determine the cause of dizziness.  At this time, your provider has found no signs that your dizziness is due to a serious or life-threatening condition. However, sometimes there is a serious problem that does not show up right away, and it is important for you to follow up with your regular provider as instructed.  Generally, every Emergency Department visit should have a follow-up clinic visit with either a primary or a specialty clinic/provider. Please follow-up as instructed by your emergency provider today.      Return to the Emergency Department if:      You pass out (fainting or falling out), especially during exercise.      You develop chest pain, chest pressure or difficulty breathing.    Your feel an irregular heartbeat.    You have excessive vaginal bleeding, or blood in your stool or vomit (throw up).    You have a high fever.    Your symptoms get worse or more  frequent.    If when you begin to feel dizzy or lightheaded, it is important to sit down or lay down immediately to prevent injury from falling.  If you were given a prescription for medicine here today, be sure to read all of the information (including the package insert) that comes with your prescription.  This will include important information about the medicine, its side effects, and any warnings that you need to know about.  The pharmacist who fills the prescription can provide more information and answer questions you may have about the medicine.  If you have questions or concerns that the pharmacist cannot address, please call or return to the Emergency Department.   Remember that you can always come back to the Emergency Department if you are not able to see your regular provider in the amount of time listed above, if you get any new symptoms, or if there is anything that worries you.

## 2018-10-11 NOTE — ED TRIAGE NOTES
"Pt has cold sxs for past week and has taken cold med designed for high blood pressure.  She has had dizziness associated with the cold which worsened in the past 2 days.  She has been taken off atenolol recently.  She has gotten elevated BP readings at home.  Yesterday readings of 127/84, 146/83, 164/105.  Today readings are 138/100 and 185/118.  Dizziness is described as \"lightheaded, fainting\".  "

## 2018-11-02 ENCOUNTER — OFFICE VISIT (OUTPATIENT)
Dept: PEDIATRICS | Facility: CLINIC | Age: 68
End: 2018-11-02
Payer: MEDICARE

## 2018-11-02 VITALS
TEMPERATURE: 98 F | SYSTOLIC BLOOD PRESSURE: 114 MMHG | BODY MASS INDEX: 24.66 KG/M2 | OXYGEN SATURATION: 99 % | HEIGHT: 62 IN | DIASTOLIC BLOOD PRESSURE: 64 MMHG | WEIGHT: 134 LBS | HEART RATE: 72 BPM

## 2018-11-02 DIAGNOSIS — M25.512 CHRONIC LEFT SHOULDER PAIN: ICD-10-CM

## 2018-11-02 DIAGNOSIS — Z01.818 PRE-OPERATIVE EXAMINATION: Primary | ICD-10-CM

## 2018-11-02 DIAGNOSIS — I10 BENIGN ESSENTIAL HYPERTENSION: ICD-10-CM

## 2018-11-02 DIAGNOSIS — G89.29 CHRONIC LEFT SHOULDER PAIN: ICD-10-CM

## 2018-11-02 PROBLEM — K63.1 PERFORATED SIGMOID COLON (H): Status: RESOLVED | Noted: 2017-01-06 | Resolved: 2018-11-02

## 2018-11-02 PROBLEM — Z90.49 HISTORY OF BOWEL RESECTION: Status: ACTIVE | Noted: 2018-11-02

## 2018-11-02 PROCEDURE — 99214 OFFICE O/P EST MOD 30 MIN: CPT | Performed by: INTERNAL MEDICINE

## 2018-11-02 ASSESSMENT — PATIENT HEALTH QUESTIONNAIRE - PHQ9
SUM OF ALL RESPONSES TO PHQ QUESTIONS 1-9: 0
5. POOR APPETITE OR OVEREATING: NOT AT ALL

## 2018-11-02 ASSESSMENT — ANXIETY QUESTIONNAIRES
1. FEELING NERVOUS, ANXIOUS, OR ON EDGE: SEVERAL DAYS
3. WORRYING TOO MUCH ABOUT DIFFERENT THINGS: NOT AT ALL
7. FEELING AFRAID AS IF SOMETHING AWFUL MIGHT HAPPEN: SEVERAL DAYS
5. BEING SO RESTLESS THAT IT IS HARD TO SIT STILL: NOT AT ALL
GAD7 TOTAL SCORE: 3
IF YOU CHECKED OFF ANY PROBLEMS ON THIS QUESTIONNAIRE, HOW DIFFICULT HAVE THESE PROBLEMS MADE IT FOR YOU TO DO YOUR WORK, TAKE CARE OF THINGS AT HOME, OR GET ALONG WITH OTHER PEOPLE: NOT DIFFICULT AT ALL
2. NOT BEING ABLE TO STOP OR CONTROL WORRYING: SEVERAL DAYS
6. BECOMING EASILY ANNOYED OR IRRITABLE: NOT AT ALL

## 2018-11-02 NOTE — PATIENT INSTRUCTIONS
No aspirin, ibuprofen, motrin, aleve, naprosyn or fish oil in the week before surgery.  No tumeric in the week before surgery.    Hold lisinopril the morning of surgery.  Ok to take atenolol and chlorthalidone the morning of surgery as prescribed with a small sip of water.      Before Your Surgery      Call your surgeon if there is any change in your health. This includes signs of a cold or flu (such as a sore throat, runny nose, cough, rash or fever).    Do not smoke, drink alcohol or take over the counter medicine (unless your surgeon or primary care doctor tells you to) for the 24 hours before and after surgery.    If you take prescribed drugs: Follow your doctor s orders about which medicines to take and which to stop until after surgery.    Eating and drinking prior to surgery: follow the instructions from your surgeon    Take a shower or bath the night before surgery. Use the soap your surgeon gave you to gently clean your skin. If you do not have soap from your surgeon, use your regular soap. Do not shave or scrub the surgery site.  Wear clean pajamas and have clean sheets on your bed.

## 2018-11-02 NOTE — PROGRESS NOTES
[facility]  [address]  [address]  [place] [address]  [phone]  Dept: [phone]    PRE-OP EVALUATION:  Today's date: 2018    Aaliyah Hayes (: 1950) presents for pre-operative evaluation assessment as requested by Dr. Shah.  She requires evaluation and anesthesia risk assessment prior to undergoing surgery/procedure for treatment of Left Reverse Total Shoulder Arthroplasty .    Fax number for surgical facility:   Primary Physician: Jessie High  Type of Anesthesia Anticipated: General    Patient has a Health Care Directive or Living Will:  NO    Preop Questions 10/30/2018   Who is doing your surgery? Dr. Mario Shah   What are you having done? Reverse total left shoulder replacement   Date of Surgery/Procedure: 2018   Facility or Hospital where procedure/surgery will be performed: [facility]   1.  Do you have a history of Heart attack, stroke, stent, coronary bypass surgery, or other heart surgery? No   2.  Do you ever have any pain or discomfort in your chest? No   3.  Do you have a history of  Heart Failure? No   4.   Are you troubled by shortness of breath when:  walking on a level surface, or up a slight hill, or at night? No   5.  Do you currently have a cold, bronchitis or other respiratory infection? No   6.  Do you have a cough, shortness of breath, or wheezing? No   7.  Do you sometimes get pains in the calves of your legs when you walk? No   8. Do you or anyone in your family have previous history of blood clots? UNKNOWN - Sister, post surgical    9.  Do you or does anyone in your family have a serious bleeding problem such as prolonged bleeding following surgeries or cuts? No   10. Have you ever had problems with anemia or been told to take iron pills? YES- currently taking Iron   11. Have you had any abnormal blood loss such as black, tarry or bloody stools, or abnormal vaginal bleeding? No   12. Have you ever  had a blood transfusion? No   13. Have you or any of your relatives ever had problems with anesthesia? No   14. Do you have sleep apnea, excessive snoring or daytime drowsiness? No   15. Do you have any prosthetic heart valves? No   16. Do you have prosthetic joints? YES - Left and Right Hips    17. Is there any chance that you may be pregnant? No         HPI:     HPI related to upcoming procedure: chronic left shoulder pain.        HYPERTENSION - Patient has longstanding history of HTN , currently denies any symptoms referable to elevated blood pressure. Specifically denies chest pain, palpitations, dyspnea, orthopnea, PND or peripheral edema. Blood pressure readings have been in normal range. Current medication regimen is as listed below. Patient denies any side effects of medication.                                                                                                                                                                                          .    MEDICAL HISTORY:     Patient Active Problem List    Diagnosis Date Noted     Anemia, unspecified type 06/26/2018     Priority: Medium     Herpetic ritesh 09/27/2017     Priority: Medium     Status post total hip replacement, left 09/26/2017     Priority: Medium     Osteoarthritis of multiple joints 06/16/2017     Priority: Medium     Benign essential hypertension 02/14/2014     Priority: Medium     Lisinopril made patient feel fidgety.       Hyperlipidemia with target LDL less than 130 02/14/2014     Priority: Medium     Diagnosis updated by automated process. Provider to review and confirm.       Allergic rhinitis due to other allergen 02/14/2014     Priority: Medium     Advanced directives, counseling/discussion 02/14/2014     Priority: Medium     Atypical squamous cells cannot exclude high grade squamous intraepithelial lesion on cytologic smear of cervix (ASC-H) 02/14/2014     Priority: Medium     02/14/14 ASCUS, Neg HPV. Repeat co-testing in  3 yrs per ASCCP guidelines.   06/09/17 ASC-H, Neg HPV, Presence of endometrial cells. Plan for colp and possible EMB  08/18/17 [place]= SELENE 1. EMB= Benign. Plan 1 yr co-test.  07/12/18: NIL pap, Neg HR HPV result. Plan 1 year cotest per provider.       History of bowel resection 11/02/2018     Priority: Low     Due to perforation during routine colonoscopy, repaired.        Insomnia 02/14/2014     Priority: Low      Past Medical History:   Diagnosis Date     Atypical squamous cells cannot exclude high grade squamous intraepithelial lesion on cytologic smear of cervix (ASC-H) 06/09/2017 06/09/17 ASC-H, Neg HPV, Endometrial cells present. [place]= SELENE 1, EMB= Benign     Facial cellulitis 11/22/2015     HTN (hypertension)      Perforated sigmoid colon (H) 1/6/2017     Past Surgical History:   Procedure Laterality Date     APPENDECTOMY  2012     COLECTOMY LEFT N/A 1/6/2017    Procedure: COLECTOMY LEFT;  Surgeon: David Gaitan MD;  Location:  OR     COLONOSCOPY N/A 1/6/2017    Procedure: COLONOSCOPY;  Surgeon: Regis Mckoy MD;  Location:  GI     LAPAROSCOPIC ASSISTED SIGMOID COLECTOMY N/A 1/6/2017    Procedure: LAPAROSCOPIC ASSISTED SIGMOID COLECTOMY;  Surgeon: David Gaitan MD;  Location:  OR     ORTHOPEDIC SURGERY       SURGICAL HISTORY OF -   09/2010    Rotator cuff repair-right shoulder     SURGICAL HISTORY OF -   12/2011    Right hip replacement     SURGICAL HISTORY OF -   2005    ACL and MCL left knee     Current Outpatient Prescriptions   Medication Sig Dispense Refill     ASPIRIN ADULT LOW STRENGTH PO Take 81 mg by mouth       ASPIRIN EC PO Take 325 mg by mouth 2 times daily (with meals)       atenolol (TENORMIN) 25 MG tablet Take 0.5 tablets (12.5 mg) by mouth daily 90 tablet 0     calcium carbonate (OSCAL 500) 1250 (500 CA) MG TABS tablet Take 1,250 mg by mouth daily       chlorthalidone (HYGROTON) 25 MG tablet TAKE ONE TABLET BY MOUTH ONCE DAILY 90 tablet 3     chlorthalidone (HYGROTON)  25 MG tablet Take 1 tablet (25 mg) by mouth daily 90 tablet 3     ferrous sulfate (SLO-FE) 142 (45 FE) MG TBCR Take 1 tablet (142 mg) by mouth 2 times daily 60 tablet 1     lisinopril (PRINIVIL/ZESTRIL) 10 MG tablet Take 1 tablet (10 mg) by mouth daily 90 tablet 3     multivitamin, therapeutic with minerals (MULTI-VITAMIN) TABS Take 1 tablet by mouth every morning  100 tablet 3     Omega-3 Fatty Acids (OMEGA 3 500) 500 MG CAPS Take 500 mg by mouth daily       omeprazole (PRILOSEC) 20 MG CR capsule Take 1 capsule (20 mg) by mouth daily 90 capsule 3     potassium gluconate 2.5 MEQ TABS Take 5 mEq by mouth daily       valACYclovir (VALTREX) 1000 mg tablet Take 2 tablets (2,000 mg) by mouth 2 times daily As needed for herpetic ritesh 30 tablet 3     vitamin  B complex with vitamin C (VITAMIN  B COMPLEX) TABS Take 1 tablet by mouth daily  0     vitamin E 50 UNIT/ML SOLN drops Take by mouth daily       OTC products: None, except as noted above    Allergies   Allergen Reactions     Ciprofloxacin      Facial swelling, tingling on her upper lip, throat slightly closed, racing heart     Oxycodone      Sulfa Drugs      Eye reaction to sulfa-based eye drop      Latex Allergy: NO    Social History   Substance Use Topics     Smoking status: Never Smoker     Smokeless tobacco: Never Used     Alcohol use 0.0 oz/week     0 Standard drinks or equivalent per week      Comment: 1 glass of wine per night     History   Drug Use No       REVIEW OF SYSTEMS:   CONSTITUTIONAL: NEGATIVE for fever, chills, change in weight  INTEGUMENTARY/SKIN: NEGATIVE for worrisome rashes, moles or lesions  EYES: NEGATIVE for vision changes or irritation  ENT/MOUTH: NEGATIVE for ear, mouth and throat problems  RESP: NEGATIVE for significant cough or SOB  BREAST: NEGATIVE for masses, tenderness or discharge  CV: NEGATIVE for chest pain, palpitations or peripheral edema  GI: NEGATIVE for nausea, abdominal pain, heartburn, or change in bowel habits  :  "NEGATIVE for frequency, dysuria, or hematuria  MUSCULOSKELETAL: NEGATIVE for significant arthralgias or myalgia  NEURO: NEGATIVE for weakness, dizziness or paresthesias  ENDOCRINE: NEGATIVE for temperature intolerance, skin/hair changes  HEME: NEGATIVE for bleeding problems  PSYCHIATRIC: NEGATIVE for changes in mood or affect    EXAM:   /64 (BP Location: Right arm, Cuff Size: Adult Regular)  Pulse 72  Temp 98  F (36.7  C) (Oral)  Ht 5' 2\" (1.575 m)  Wt 134 lb (60.8 kg)  SpO2 99%  BMI 24.51 kg/m2    GENERAL APPEARANCE: healthy, alert and no distress     EYES: EOMI, PERRL     HENT: ear canals and TM's normal and nose and mouth without ulcers or lesions     NECK: no adenopathy, no asymmetry, masses, or scars and thyroid normal to palpation     RESP: lungs clear to auscultation - no rales, rhonchi or wheezes     CV: regular rates and rhythm, normal S1 S2, no S3 or S4 and no murmur, click or rub     ABDOMEN:  soft, nontender, no HSM or masses and bowel sounds normal     MS: extremities normal- no gross deformities noted, no evidence of inflammation in joints, FROM in all extremities.     SKIN: no suspicious lesions or rashes     NEURO: Normal strength and tone, sensory exam grossly normal, mentation intact and speech normal     PSYCH: mentation appears normal. and affect normal/bright     LYMPHATICS: No cervical adenopathy    DIAGNOSTICS:   EKG: Not indicated due to non-vascular surgery and last ekg on 10/11/18 (within 30 days for CAD history or last year for cardiac risk factors)    Recent Labs   Lab Test  10/11/18   0856  06/23/18   0939  11/10/17   1110 09/27/17   02/24/15   0952   HGB  14.8   --   12.3  8.8*   < >   --    PLT  317   --    --   439   < >   --    NA  133  135   --   138   < >  136   POTASSIUM  3.5  4.0   --   4.0   < >  3.9   CR  0.69  0.69   --   0.56   < >  0.70   A1C   --    --    --    --    --   5.5    < > = values in this interval not displayed.        IMPRESSION:   Reason for " surgery/procedure: chronic left shoulder pain   Diagnosis/reason for consult: preop     The proposed surgical procedure is considered INTERMEDIATE risk.    REVISED CARDIAC RISK INDEX  The patient has the following serious cardiovascular risks for perioperative complications such as (MI, PE, VFib and 3  AV Block):  No serious cardiac risks  INTERPRETATION: 0 risks: Class I (very low risk - 0.4% complication rate)    The patient has the following additional risks for perioperative complications:  No identified additional risks      ICD-10-CM    1. Pre-operative examination Z01.818    2. Chronic left shoulder pain M25.512     G89.29    3. Benign essential hypertension I10        RECOMMENDATIONS:         --Patient is to take all scheduled medications on the day of surgery EXCEPT for modifications listed below.    ACE Inhibitor or Angiotensin Receptor Blocker (ARB) Use  Ace inhibitor or Angiotensin Receptor Blocker (ARB) and should HOLD this medication for the 24 hours prior to surgery.      APPROVAL GIVEN to proceed with proposed procedure, without further diagnostic evaluation       Signed Electronically by: Jessie High MD    Copy of this evaluation report is provided to requesting physician.    [facility] Preop Guidelines    Revised Cardiac Risk Index

## 2018-11-02 NOTE — MR AVS SNAPSHOT
After Visit Summary   11/2/2018    Aaliyah Hayes    MRN: [id number]           Patient Information     Date Of Birth          1950        Visit Information        Provider Department      11/2/2018 11:00 AM Jessie High MD [facility]        Today's Diagnoses     Pre-operative examination    -  1    Chronic left shoulder pain        Benign essential hypertension          Care Instructions    No aspirin, ibuprofen, motrin, aleve, naprosyn or fish oil in the week before surgery.  No tumeric in the week before surgery.    Hold lisinopril the morning of surgery.  Ok to take atenolol and chlorthalidone the morning of surgery as prescribed with a small sip of water.      Before Your Surgery      Call your surgeon if there is any change in your health. This includes signs of a cold or flu (such as a sore throat, runny nose, cough, rash or fever).    Do not smoke, drink alcohol or take over the counter medicine (unless your surgeon or primary care doctor tells you to) for the 24 hours before and after surgery.    If you take prescribed drugs: Follow your doctor s orders about which medicines to take and which to stop until after surgery.    Eating and drinking prior to surgery: follow the instructions from your surgeon    Take a shower or bath the night before surgery. Use the soap your surgeon gave you to gently clean your skin. If you do not have soap from your surgeon, use your regular soap. Do not shave or scrub the surgery site.  Wear clean pajamas and have clean sheets on your bed.           Follow-ups after your visit        Your next 10 appointments already scheduled     Nov 20, 2018   Procedure with Cricket Shah MD   [facility] (--)    [address]  [facility] [id number] [phone]              Who to contact     If you have questions or need follow up information about today's clinic visit or your schedule please contact [facility]  [facility] directly at [phone].  Normal or non-critical lab and imaging results will be communicated to you by MyChart, letter or phone within 4 business days after the clinic has received the results. If you do not hear from us within 7 days, please contact the clinic through [facility] [facility] or phone. If you have a critical or abnormal lab result, we will notify you by phone as soon as possible.  Submit refill requests through [employer] or call your pharmacy and they will forward the refill request to us. Please allow 3 business days for your refill to be completed.          Additional Information About Your Visit        [facility] Information     [employer] gives you secure access to your electronic health record. If you see a primary care provider, you can also send messages to your care team and make appointments. If you have questions, please call your primary care clinic.  If you do not have a primary care provider, please call [phone] and they will assist you.        Care EveryWhere ID     This is your Care EveryWhere ID. This could be used by other organizations to access your [place] medical records  [id number]        Your Vitals Were     Pulse Temperature Height Pulse Oximetry BMI (Body Mass Index)       72 98  F (36.7  C) (Oral) 5' 2\" (1.575 m) 99% 24.51 kg/m2        Blood Pressure from Last 3 Encounters:   11/02/18 114/64   10/11/18 140/75   07/12/18 124/62    Weight from Last 3 Encounters:   11/02/18 134 lb (60.8 kg)   07/12/18 135 lb 6.4 oz (61.4 kg)   06/26/18 132 lb 6.4 oz (60.1 kg)              Today, you had the following     No orders found for display         Today's Medication Changes          These changes are accurate as of 11/2/18 11:31 AM.  If you have any questions, ask your nurse or doctor.               These medicines have changed or have updated prescriptions.        Dose/Directions    chlorthalidone 25 MG tablet   Commonly known as:  HYGROTON   This may have changed:  Another " medication with the same name was removed. Continue taking this medication, and follow the directions you see here.   Used for:  Benign essential hypertension   Changed by:  Jessie High MD        TAKE ONE TABLET BY MOUTH ONCE DAILY   Quantity:  90 tablet   Refills:  3                Primary Care Provider Office Phone # Fax #    Jessie High [phone] [address] [place] [address]        Equal Access to Services     [facility]: Hadii aad ku hadasho Soomaali, waaxda luqadaha, qaybta kaalmada adeegyada, waxay idiin hayaan adeeg kharash la'aan . So [facility] [phone].    ATENCIÓN: Si habla español, tiene a coates disposición servicios gratuitos de asistencia lingüística. Llame al [phone].    We comply with applicable federal civil rights laws and [place] laws. We do not discriminate on the basis of race, color, national origin, age, disability, sex, sexual orientation, or gender identity.            Thank you!     Thank you for choosing [facility]  for your care. Our goal is always to provide you with excellent care. Hearing back from our patients is one way we can continue to improve our services. Please take a few minutes to complete the written survey that you may receive in the mail after your visit with us. Thank you!             Your Updated Medication List - Protect others around you: Learn how to safely use, store and throw away your medicines at www.disposemymeds.org.          This list is accurate as of 11/2/18 11:31 AM.  Always use your most recent med list.                   Brand Name Dispense Instructions for use Diagnosis    * ASPIRIN EC PO      Take 325 mg by mouth 2 times daily (with meals)        * ASPIRIN ADULT LOW STRENGTH PO      Take 81 mg by mouth        atenolol 25 MG tablet    TENORMIN    90 tablet    Take 0.5 tablets (12.5 mg) by mouth daily    Benign essential hypertension       calcium carbonate 500 mg  (elemental) 1250 (500 Ca) MG Tabs tablet    OSCAL 500     Take 1,250 mg by mouth daily        chlorthalidone 25 MG tablet    HYGROTON    90 tablet    TAKE ONE TABLET BY MOUTH ONCE DAILY    Benign essential hypertension       ferrous sulfate 142 (45 Fe) MG Tbcr    SLO-FE    60 tablet    Take 1 tablet (142 mg) by mouth 2 times daily    Anemia, unspecified type       lisinopril 10 MG tablet    PRINIVIL/ZESTRIL    90 tablet    Take 1 tablet (10 mg) by mouth daily    Benign essential hypertension       Multi-vitamin Tabs tablet     100 tablet    Take 1 tablet by mouth every morning        OMEGA 3 500 500 MG Caps      Take 500 mg by mouth daily        omeprazole 20 MG CR capsule    priLOSEC    90 capsule    Take 1 capsule (20 mg) by mouth daily    Gastroesophageal reflux disease without esophagitis       potassium gluconate 2.5 MEQ Tabs      Take 5 mEq by mouth daily        valACYclovir 1000 mg tablet    VALTREX    30 tablet    Take 2 tablets (2,000 mg) by mouth 2 times daily As needed for herpetic ritesh    Herpetic ritesh       vitamin B complex with vitamin C Tabs tablet      Take 1 tablet by mouth daily        vitamin E 50 UNIT/ML Soln drops      Take by mouth daily        * Notice:  This list has 2 medication(s) that are the same as other medications prescribed for you. Read the directions carefully, and ask your doctor or other care provider to review them with you.

## 2018-11-03 ASSESSMENT — ANXIETY QUESTIONNAIRES: GAD7 TOTAL SCORE: 3

## 2018-11-15 NOTE — H&P (VIEW-ONLY)
[facility]  [address]  [address]  [place] [address]  [phone]  Dept: [phone]    PRE-OP EVALUATION:  Today's date: 2018    Aaliyah Hayes (: 1950) presents for pre-operative evaluation assessment as requested by Dr. Shah.  She requires evaluation and anesthesia risk assessment prior to undergoing surgery/procedure for treatment of Left Reverse Total Shoulder Arthroplasty .    Fax number for surgical facility:   Primary Physician: Jessie High  Type of Anesthesia Anticipated: General    Patient has a Health Care Directive or Living Will:  NO    Preop Questions 10/30/2018   Who is doing your surgery? Dr. Mario Shah   What are you having done? Reverse total left shoulder replacement   Date of Surgery/Procedure: 2018   Facility or Hospital where procedure/surgery will be performed: [facility]   1.  Do you have a history of Heart attack, stroke, stent, coronary bypass surgery, or other heart surgery? No   2.  Do you ever have any pain or discomfort in your chest? No   3.  Do you have a history of  Heart Failure? No   4.   Are you troubled by shortness of breath when:  walking on a level surface, or up a slight hill, or at night? No   5.  Do you currently have a cold, bronchitis or other respiratory infection? No   6.  Do you have a cough, shortness of breath, or wheezing? No   7.  Do you sometimes get pains in the calves of your legs when you walk? No   8. Do you or anyone in your family have previous history of blood clots? UNKNOWN - Sister, post surgical    9.  Do you or does anyone in your family have a serious bleeding problem such as prolonged bleeding following surgeries or cuts? No   10. Have you ever had problems with anemia or been told to take iron pills? YES- currently taking Iron   11. Have you had any abnormal blood loss such as black, tarry or bloody stools, or abnormal vaginal bleeding? No   12. Have you ever  had a blood transfusion? No   13. Have you or any of your relatives ever had problems with anesthesia? No   14. Do you have sleep apnea, excessive snoring or daytime drowsiness? No   15. Do you have any prosthetic heart valves? No   16. Do you have prosthetic joints? YES - Left and Right Hips    17. Is there any chance that you may be pregnant? No         HPI:     HPI related to upcoming procedure: chronic left shoulder pain.        HYPERTENSION - Patient has longstanding history of HTN , currently denies any symptoms referable to elevated blood pressure. Specifically denies chest pain, palpitations, dyspnea, orthopnea, PND or peripheral edema. Blood pressure readings have been in normal range. Current medication regimen is as listed below. Patient denies any side effects of medication.                                                                                                                                                                                          .    MEDICAL HISTORY:     Patient Active Problem List    Diagnosis Date Noted     Anemia, unspecified type 06/26/2018     Priority: Medium     Herpetic ritesh 09/27/2017     Priority: Medium     Status post total hip replacement, left 09/26/2017     Priority: Medium     Osteoarthritis of multiple joints 06/16/2017     Priority: Medium     Benign essential hypertension 02/14/2014     Priority: Medium     Lisinopril made patient feel fidgety.       Hyperlipidemia with target LDL less than 130 02/14/2014     Priority: Medium     Diagnosis updated by automated process. Provider to review and confirm.       Allergic rhinitis due to other allergen 02/14/2014     Priority: Medium     Advanced directives, counseling/discussion 02/14/2014     Priority: Medium     Atypical squamous cells cannot exclude high grade squamous intraepithelial lesion on cytologic smear of cervix (ASC-H) 02/14/2014     Priority: Medium     02/14/14 ASCUS, Neg HPV. Repeat co-testing in  3 yrs per ASCCP guidelines.   06/09/17 ASC-H, Neg HPV, Presence of endometrial cells. Plan for colp and possible EMB  08/18/17 [place]= SELENE 1. EMB= Benign. Plan 1 yr co-test.  07/12/18: NIL pap, Neg HR HPV result. Plan 1 year cotest per provider.       History of bowel resection 11/02/2018     Priority: Low     Due to perforation during routine colonoscopy, repaired.        Insomnia 02/14/2014     Priority: Low      Past Medical History:   Diagnosis Date     Atypical squamous cells cannot exclude high grade squamous intraepithelial lesion on cytologic smear of cervix (ASC-H) 06/09/2017 06/09/17 ASC-H, Neg HPV, Endometrial cells present. [place]= SELENE 1, EMB= Benign     Facial cellulitis 11/22/2015     HTN (hypertension)      Perforated sigmoid colon (H) 1/6/2017     Past Surgical History:   Procedure Laterality Date     APPENDECTOMY  2012     COLECTOMY LEFT N/A 1/6/2017    Procedure: COLECTOMY LEFT;  Surgeon: David Gaitan MD;  Location:  OR     COLONOSCOPY N/A 1/6/2017    Procedure: COLONOSCOPY;  Surgeon: Regis Mckoy MD;  Location:  GI     LAPAROSCOPIC ASSISTED SIGMOID COLECTOMY N/A 1/6/2017    Procedure: LAPAROSCOPIC ASSISTED SIGMOID COLECTOMY;  Surgeon: David Gaitan MD;  Location:  OR     ORTHOPEDIC SURGERY       SURGICAL HISTORY OF -   09/2010    Rotator cuff repair-right shoulder     SURGICAL HISTORY OF -   12/2011    Right hip replacement     SURGICAL HISTORY OF -   2005    ACL and MCL left knee     Current Outpatient Prescriptions   Medication Sig Dispense Refill     ASPIRIN ADULT LOW STRENGTH PO Take 81 mg by mouth       ASPIRIN EC PO Take 325 mg by mouth 2 times daily (with meals)       atenolol (TENORMIN) 25 MG tablet Take 0.5 tablets (12.5 mg) by mouth daily 90 tablet 0     calcium carbonate (OSCAL 500) 1250 (500 CA) MG TABS tablet Take 1,250 mg by mouth daily       chlorthalidone (HYGROTON) 25 MG tablet TAKE ONE TABLET BY MOUTH ONCE DAILY 90 tablet 3     chlorthalidone (HYGROTON)  25 MG tablet Take 1 tablet (25 mg) by mouth daily 90 tablet 3     ferrous sulfate (SLO-FE) 142 (45 FE) MG TBCR Take 1 tablet (142 mg) by mouth 2 times daily 60 tablet 1     lisinopril (PRINIVIL/ZESTRIL) 10 MG tablet Take 1 tablet (10 mg) by mouth daily 90 tablet 3     multivitamin, therapeutic with minerals (MULTI-VITAMIN) TABS Take 1 tablet by mouth every morning  100 tablet 3     Omega-3 Fatty Acids (OMEGA 3 500) 500 MG CAPS Take 500 mg by mouth daily       omeprazole (PRILOSEC) 20 MG CR capsule Take 1 capsule (20 mg) by mouth daily 90 capsule 3     potassium gluconate 2.5 MEQ TABS Take 5 mEq by mouth daily       valACYclovir (VALTREX) 1000 mg tablet Take 2 tablets (2,000 mg) by mouth 2 times daily As needed for herpetic ritesh 30 tablet 3     vitamin  B complex with vitamin C (VITAMIN  B COMPLEX) TABS Take 1 tablet by mouth daily  0     vitamin E 50 UNIT/ML SOLN drops Take by mouth daily       OTC products: None, except as noted above    Allergies   Allergen Reactions     Ciprofloxacin      Facial swelling, tingling on her upper lip, throat slightly closed, racing heart     Oxycodone      Sulfa Drugs      Eye reaction to sulfa-based eye drop      Latex Allergy: NO    Social History   Substance Use Topics     Smoking status: Never Smoker     Smokeless tobacco: Never Used     Alcohol use 0.0 oz/week     0 Standard drinks or equivalent per week      Comment: 1 glass of wine per night     History   Drug Use No       REVIEW OF SYSTEMS:   CONSTITUTIONAL: NEGATIVE for fever, chills, change in weight  INTEGUMENTARY/SKIN: NEGATIVE for worrisome rashes, moles or lesions  EYES: NEGATIVE for vision changes or irritation  ENT/MOUTH: NEGATIVE for ear, mouth and throat problems  RESP: NEGATIVE for significant cough or SOB  BREAST: NEGATIVE for masses, tenderness or discharge  CV: NEGATIVE for chest pain, palpitations or peripheral edema  GI: NEGATIVE for nausea, abdominal pain, heartburn, or change in bowel habits  :  "NEGATIVE for frequency, dysuria, or hematuria  MUSCULOSKELETAL: NEGATIVE for significant arthralgias or myalgia  NEURO: NEGATIVE for weakness, dizziness or paresthesias  ENDOCRINE: NEGATIVE for temperature intolerance, skin/hair changes  HEME: NEGATIVE for bleeding problems  PSYCHIATRIC: NEGATIVE for changes in mood or affect    EXAM:   /64 (BP Location: Right arm, Cuff Size: Adult Regular)  Pulse 72  Temp 98  F (36.7  C) (Oral)  Ht 5' 2\" (1.575 m)  Wt 134 lb (60.8 kg)  SpO2 99%  BMI 24.51 kg/m2    GENERAL APPEARANCE: healthy, alert and no distress     EYES: EOMI, PERRL     HENT: ear canals and TM's normal and nose and mouth without ulcers or lesions     NECK: no adenopathy, no asymmetry, masses, or scars and thyroid normal to palpation     RESP: lungs clear to auscultation - no rales, rhonchi or wheezes     CV: regular rates and rhythm, normal S1 S2, no S3 or S4 and no murmur, click or rub     ABDOMEN:  soft, nontender, no HSM or masses and bowel sounds normal     MS: extremities normal- no gross deformities noted, no evidence of inflammation in joints, FROM in all extremities.     SKIN: no suspicious lesions or rashes     NEURO: Normal strength and tone, sensory exam grossly normal, mentation intact and speech normal     PSYCH: mentation appears normal. and affect normal/bright     LYMPHATICS: No cervical adenopathy    DIAGNOSTICS:   EKG: Not indicated due to non-vascular surgery and last ekg on 10/11/18 (within 30 days for CAD history or last year for cardiac risk factors)    Recent Labs   Lab Test  10/11/18   0856  06/23/18   0939  11/10/17   1110 09/27/17   02/24/15   0952   HGB  14.8   --   12.3  8.8*   < >   --    PLT  317   --    --   439   < >   --    NA  133  135   --   138   < >  136   POTASSIUM  3.5  4.0   --   4.0   < >  3.9   CR  0.69  0.69   --   0.56   < >  0.70   A1C   --    --    --    --    --   5.5    < > = values in this interval not displayed.        IMPRESSION:   Reason for " surgery/procedure: chronic left shoulder pain   Diagnosis/reason for consult: preop     The proposed surgical procedure is considered INTERMEDIATE risk.    REVISED CARDIAC RISK INDEX  The patient has the following serious cardiovascular risks for perioperative complications such as (MI, PE, VFib and 3  AV Block):  No serious cardiac risks  INTERPRETATION: 0 risks: Class I (very low risk - 0.4% complication rate)    The patient has the following additional risks for perioperative complications:  No identified additional risks      ICD-10-CM    1. Pre-operative examination Z01.818    2. Chronic left shoulder pain M25.512     G89.29    3. Benign essential hypertension I10        RECOMMENDATIONS:         --Patient is to take all scheduled medications on the day of surgery EXCEPT for modifications listed below.    ACE Inhibitor or Angiotensin Receptor Blocker (ARB) Use  Ace inhibitor or Angiotensin Receptor Blocker (ARB) and should HOLD this medication for the 24 hours prior to surgery.      APPROVAL GIVEN to proceed with proposed procedure, without further diagnostic evaluation       Signed Electronically by: Jessie High MD    Copy of this evaluation report is provided to requesting physician.    [facility] Preop Guidelines    Revised Cardiac Risk Index

## 2018-11-19 ENCOUNTER — HOSPITAL ENCOUNTER (OUTPATIENT)
Dept: LAB | Facility: CLINIC | Age: 68
Discharge: HOME | DRG: 483 | End: 2018-11-19
Attending: ORTHOPAEDIC SURGERY | Admitting: ORTHOPAEDIC SURGERY
Payer: MEDICARE

## 2018-11-19 DIAGNOSIS — Z01.812 PRE-OPERATIVE LABORATORY EXAMINATION: ICD-10-CM

## 2018-11-19 LAB
ABO + RH BLD: ABNORMAL
ABO + RH BLD: ABNORMAL
ABO + RH BLD: NORMAL
ABO + RH BLD: NORMAL
BLD GP AB INVEST PLASRBC-IMP: NORMAL
BLD GP AB SCN SERPL QL: ABNORMAL
BLD PROD TYP BPU: ABNORMAL
BLOOD BANK CMNT PATIENT-IMP: ABNORMAL
BLOOD BANK CMNT PATIENT-IMP: ABNORMAL
BLOOD BANK CMNT PATIENT-IMP: NORMAL
CREAT SERPL-MCNC: 0.82 MG/DL (ref 0.52–1.04)
GFR SERPL CREATININE-BSD FRML MDRD: 70 ML/MIN/1.7M2
HGB BLD-MCNC: 14.2 G/DL (ref 11.7–15.7)
NUM BPU REQUESTED: 2
POTASSIUM SERPL-SCNC: 3.5 MMOL/L (ref 3.4–5.3)
SPECIMEN EXP DATE BLD: ABNORMAL

## 2018-11-19 PROCEDURE — 82565 ASSAY OF CREATININE: CPT | Performed by: ORTHOPAEDIC SURGERY

## 2018-11-19 PROCEDURE — 84132 ASSAY OF SERUM POTASSIUM: CPT | Performed by: ORTHOPAEDIC SURGERY

## 2018-11-19 PROCEDURE — 86850 RBC ANTIBODY SCREEN: CPT | Performed by: ORTHOPAEDIC SURGERY

## 2018-11-19 PROCEDURE — 86922 COMPATIBILITY TEST ANTIGLOB: CPT | Performed by: ORTHOPAEDIC SURGERY

## 2018-11-19 PROCEDURE — 36415 COLL VENOUS BLD VENIPUNCTURE: CPT | Performed by: ORTHOPAEDIC SURGERY

## 2018-11-19 PROCEDURE — 86870 RBC ANTIBODY IDENTIFICATION: CPT | Performed by: ORTHOPAEDIC SURGERY

## 2018-11-19 PROCEDURE — 85018 HEMOGLOBIN: CPT | Performed by: ORTHOPAEDIC SURGERY

## 2018-11-19 PROCEDURE — 86901 BLOOD TYPING SEROLOGIC RH(D): CPT | Performed by: ORTHOPAEDIC SURGERY

## 2018-11-19 PROCEDURE — 86900 BLOOD TYPING SEROLOGIC ABO: CPT | Performed by: ORTHOPAEDIC SURGERY

## 2018-11-20 ENCOUNTER — HOSPITAL ENCOUNTER (INPATIENT)
Facility: CLINIC | Age: 68
LOS: 1 days | Discharge: HOME HEALTH | DRG: 483 | End: 2018-11-21
Attending: ORTHOPAEDIC SURGERY | Admitting: ORTHOPAEDIC SURGERY
Payer: MEDICARE

## 2018-11-20 ENCOUNTER — ANESTHESIA (OUTPATIENT)
Dept: SURGERY | Facility: CLINIC | Age: 68
DRG: 483 | End: 2018-11-20
Payer: MEDICARE

## 2018-11-20 ENCOUNTER — ANESTHESIA EVENT (OUTPATIENT)
Dept: SURGERY | Facility: CLINIC | Age: 68
DRG: 483 | End: 2018-11-20
Payer: MEDICARE

## 2018-11-20 ENCOUNTER — APPOINTMENT (OUTPATIENT)
Dept: GENERAL RADIOLOGY | Facility: CLINIC | Age: 68
DRG: 483 | End: 2018-11-20
Attending: PHYSICIAN ASSISTANT
Payer: MEDICARE

## 2018-11-20 DIAGNOSIS — Z96.612 STATUS POST REVERSE TOTAL REPLACEMENT OF LEFT SHOULDER: Primary | ICD-10-CM

## 2018-11-20 DIAGNOSIS — R11.0 NAUSEA: ICD-10-CM

## 2018-11-20 PROBLEM — Z96.619 S/P REVERSE TOTAL SHOULDER ARTHROPLASTY: Status: ACTIVE | Noted: 2018-11-20

## 2018-11-20 LAB
CREAT SERPL-MCNC: 0.84 MG/DL (ref 0.52–1.04)
GFR SERPL CREATININE-BSD FRML MDRD: 68 ML/MIN/1.7M2
PLATELET # BLD AUTO: 245 10E9/L (ref 150–450)

## 2018-11-20 PROCEDURE — 12000013 ZZH R&B PEDS

## 2018-11-20 PROCEDURE — 25000128 H RX IP 250 OP 636: Performed by: NURSE ANESTHETIST, CERTIFIED REGISTERED

## 2018-11-20 PROCEDURE — 0RRK00Z REPLACEMENT OF LEFT SHOULDER JOINT WITH REVERSE BALL AND SOCKET SYNTHETIC SUBSTITUTE, OPEN APPROACH: ICD-10-PCS | Performed by: ORTHOPAEDIC SURGERY

## 2018-11-20 PROCEDURE — 36415 COLL VENOUS BLD VENIPUNCTURE: CPT | Performed by: PHYSICIAN ASSISTANT

## 2018-11-20 PROCEDURE — 36000063 ZZH SURGERY LEVEL 4 EA 15 ADDTL MIN: Performed by: ORTHOPAEDIC SURGERY

## 2018-11-20 PROCEDURE — 27210794 ZZH OR GENERAL SUPPLY STERILE: Performed by: ORTHOPAEDIC SURGERY

## 2018-11-20 PROCEDURE — 25000128 H RX IP 250 OP 636: Performed by: PHYSICIAN ASSISTANT

## 2018-11-20 PROCEDURE — L3670 SO ACRO/CLAV CAN WEB PRE OTS: HCPCS

## 2018-11-20 PROCEDURE — 37000009 ZZH ANESTHESIA TECHNICAL FEE, EACH ADDTL 15 MIN: Performed by: ORTHOPAEDIC SURGERY

## 2018-11-20 PROCEDURE — 85049 AUTOMATED PLATELET COUNT: CPT | Performed by: PHYSICIAN ASSISTANT

## 2018-11-20 PROCEDURE — 25000132 ZZH RX MED GY IP 250 OP 250 PS 637: Performed by: PHYSICIAN ASSISTANT

## 2018-11-20 PROCEDURE — 82565 ASSAY OF CREATININE: CPT | Performed by: PHYSICIAN ASSISTANT

## 2018-11-20 PROCEDURE — 25000566 ZZH SEVOFLURANE, EA 15 MIN: Performed by: ORTHOPAEDIC SURGERY

## 2018-11-20 PROCEDURE — 25000128 H RX IP 250 OP 636: Performed by: ANESTHESIOLOGY

## 2018-11-20 PROCEDURE — 25000125 ZZHC RX 250: Performed by: PHYSICIAN ASSISTANT

## 2018-11-20 PROCEDURE — 71000012 ZZH RECOVERY PHASE 1 LEVEL 1 FIRST HR: Performed by: ORTHOPAEDIC SURGERY

## 2018-11-20 PROCEDURE — 37000008 ZZH ANESTHESIA TECHNICAL FEE, 1ST 30 MIN: Performed by: ORTHOPAEDIC SURGERY

## 2018-11-20 PROCEDURE — 36000093 ZZH SURGERY LEVEL 4 1ST 30 MIN: Performed by: ORTHOPAEDIC SURGERY

## 2018-11-20 PROCEDURE — A9270 NON-COVERED ITEM OR SERVICE: HCPCS | Performed by: PHYSICIAN ASSISTANT

## 2018-11-20 PROCEDURE — 25000128 H RX IP 250 OP 636: Performed by: ORTHOPAEDIC SURGERY

## 2018-11-20 PROCEDURE — 25000125 ZZHC RX 250: Performed by: NURSE ANESTHETIST, CERTIFIED REGISTERED

## 2018-11-20 PROCEDURE — 40000986 XR SHOULDER LT PORT G/E 2 VW: Mod: LT

## 2018-11-20 PROCEDURE — 71000013 ZZH RECOVERY PHASE 1 LEVEL 1 EA ADDTL HR: Performed by: ORTHOPAEDIC SURGERY

## 2018-11-20 PROCEDURE — 27110028 ZZH OR GENERAL SUPPLY NON-STERILE: Performed by: ORTHOPAEDIC SURGERY

## 2018-11-20 PROCEDURE — 40000305 ZZH STATISTIC PRE PROC ASSESS I: Performed by: ORTHOPAEDIC SURGERY

## 2018-11-20 PROCEDURE — 25000125 ZZHC RX 250: Performed by: ORTHOPAEDIC SURGERY

## 2018-11-20 PROCEDURE — C1713 ANCHOR/SCREW BN/BN,TIS/BN: HCPCS | Performed by: ORTHOPAEDIC SURGERY

## 2018-11-20 DEVICE — IMPLANTABLE DEVICE: Type: IMPLANTABLE DEVICE | Site: SHOULDER | Status: FUNCTIONAL

## 2018-11-20 RX ORDER — LIDOCAINE 40 MG/G
CREAM TOPICAL
Status: DISCONTINUED | OUTPATIENT
Start: 2018-11-20 | End: 2018-11-21 | Stop reason: HOSPADM

## 2018-11-20 RX ORDER — FENTANYL CITRATE 50 UG/ML
INJECTION, SOLUTION INTRAMUSCULAR; INTRAVENOUS PRN
Status: DISCONTINUED | OUTPATIENT
Start: 2018-11-20 | End: 2018-11-20

## 2018-11-20 RX ORDER — ONDANSETRON 2 MG/ML
4 INJECTION INTRAMUSCULAR; INTRAVENOUS EVERY 30 MIN PRN
Status: DISCONTINUED | OUTPATIENT
Start: 2018-11-20 | End: 2018-11-20 | Stop reason: HOSPADM

## 2018-11-20 RX ORDER — ONDANSETRON 4 MG/1
4 TABLET, ORALLY DISINTEGRATING ORAL EVERY 30 MIN PRN
Status: DISCONTINUED | OUTPATIENT
Start: 2018-11-20 | End: 2018-11-20 | Stop reason: HOSPADM

## 2018-11-20 RX ORDER — DIMENHYDRINATE 50 MG/ML
25 INJECTION, SOLUTION INTRAMUSCULAR; INTRAVENOUS
Status: DISCONTINUED | OUTPATIENT
Start: 2018-11-20 | End: 2018-11-20 | Stop reason: HOSPADM

## 2018-11-20 RX ORDER — HYDROMORPHONE HYDROCHLORIDE 1 MG/ML
.3-.5 INJECTION, SOLUTION INTRAMUSCULAR; INTRAVENOUS; SUBCUTANEOUS EVERY 10 MIN PRN
Status: DISCONTINUED | OUTPATIENT
Start: 2018-11-20 | End: 2018-11-20 | Stop reason: HOSPADM

## 2018-11-20 RX ORDER — MEPERIDINE HYDROCHLORIDE 50 MG/ML
12.5 INJECTION INTRAMUSCULAR; INTRAVENOUS; SUBCUTANEOUS
Status: DISCONTINUED | OUTPATIENT
Start: 2018-11-20 | End: 2018-11-20 | Stop reason: HOSPADM

## 2018-11-20 RX ORDER — CEFAZOLIN SODIUM 1 G/50ML
1 INJECTION, SOLUTION INTRAVENOUS EVERY 8 HOURS
Status: COMPLETED | OUTPATIENT
Start: 2018-11-20 | End: 2018-11-21

## 2018-11-20 RX ORDER — NALOXONE HYDROCHLORIDE 0.4 MG/ML
.1-.4 INJECTION, SOLUTION INTRAMUSCULAR; INTRAVENOUS; SUBCUTANEOUS
Status: DISCONTINUED | OUTPATIENT
Start: 2018-11-20 | End: 2018-11-20 | Stop reason: HOSPADM

## 2018-11-20 RX ORDER — ALBUTEROL SULFATE 0.83 MG/ML
2.5 SOLUTION RESPIRATORY (INHALATION) EVERY 4 HOURS PRN
Status: DISCONTINUED | OUTPATIENT
Start: 2018-11-20 | End: 2018-11-20 | Stop reason: HOSPADM

## 2018-11-20 RX ORDER — HYDROCODONE BITARTRATE AND ACETAMINOPHEN 5; 325 MG/1; MG/1
1-2 TABLET ORAL EVERY 4 HOURS PRN
Status: DISCONTINUED | OUTPATIENT
Start: 2018-11-20 | End: 2018-11-21

## 2018-11-20 RX ORDER — PROPOFOL 10 MG/ML
INJECTION, EMULSION INTRAVENOUS PRN
Status: DISCONTINUED | OUTPATIENT
Start: 2018-11-20 | End: 2018-11-20

## 2018-11-20 RX ORDER — SODIUM CHLORIDE, SODIUM LACTATE, POTASSIUM CHLORIDE, CALCIUM CHLORIDE 600; 310; 30; 20 MG/100ML; MG/100ML; MG/100ML; MG/100ML
INJECTION, SOLUTION INTRAVENOUS CONTINUOUS
Status: DISCONTINUED | OUTPATIENT
Start: 2018-11-20 | End: 2018-11-21 | Stop reason: HOSPADM

## 2018-11-20 RX ORDER — SODIUM CHLORIDE, SODIUM LACTATE, POTASSIUM CHLORIDE, CALCIUM CHLORIDE 600; 310; 30; 20 MG/100ML; MG/100ML; MG/100ML; MG/100ML
INJECTION, SOLUTION INTRAVENOUS CONTINUOUS
Status: DISCONTINUED | OUTPATIENT
Start: 2018-11-20 | End: 2018-11-20 | Stop reason: HOSPADM

## 2018-11-20 RX ORDER — LISINOPRIL 10 MG/1
10 TABLET ORAL DAILY
Status: DISCONTINUED | OUTPATIENT
Start: 2018-11-20 | End: 2018-11-21 | Stop reason: HOSPADM

## 2018-11-20 RX ORDER — AMOXICILLIN 250 MG
2 CAPSULE ORAL 2 TIMES DAILY
Status: DISCONTINUED | OUTPATIENT
Start: 2018-11-20 | End: 2018-11-21 | Stop reason: HOSPADM

## 2018-11-20 RX ORDER — ONDANSETRON 2 MG/ML
INJECTION INTRAMUSCULAR; INTRAVENOUS PRN
Status: DISCONTINUED | OUTPATIENT
Start: 2018-11-20 | End: 2018-11-20

## 2018-11-20 RX ORDER — FENTANYL CITRATE 50 UG/ML
25-50 INJECTION, SOLUTION INTRAMUSCULAR; INTRAVENOUS
Status: DISCONTINUED | OUTPATIENT
Start: 2018-11-20 | End: 2018-11-20 | Stop reason: HOSPADM

## 2018-11-20 RX ORDER — ONDANSETRON 4 MG/1
4 TABLET, ORALLY DISINTEGRATING ORAL EVERY 6 HOURS PRN
Status: DISCONTINUED | OUTPATIENT
Start: 2018-11-20 | End: 2018-11-21 | Stop reason: HOSPADM

## 2018-11-20 RX ORDER — DEXAMETHASONE SODIUM PHOSPHATE 4 MG/ML
INJECTION, SOLUTION INTRA-ARTICULAR; INTRALESIONAL; INTRAMUSCULAR; INTRAVENOUS; SOFT TISSUE PRN
Status: DISCONTINUED | OUTPATIENT
Start: 2018-11-20 | End: 2018-11-20

## 2018-11-20 RX ORDER — CEFAZOLIN SODIUM 2 G/100ML
2 INJECTION, SOLUTION INTRAVENOUS
Status: COMPLETED | OUTPATIENT
Start: 2018-11-20 | End: 2018-11-20

## 2018-11-20 RX ORDER — NEOSTIGMINE METHYLSULFATE 1 MG/ML
VIAL (ML) INJECTION PRN
Status: DISCONTINUED | OUTPATIENT
Start: 2018-11-20 | End: 2018-11-20

## 2018-11-20 RX ORDER — ONDANSETRON 2 MG/ML
4 INJECTION INTRAMUSCULAR; INTRAVENOUS EVERY 6 HOURS PRN
Status: DISCONTINUED | OUTPATIENT
Start: 2018-11-20 | End: 2018-11-21 | Stop reason: HOSPADM

## 2018-11-20 RX ORDER — NALOXONE HYDROCHLORIDE 0.4 MG/ML
.1-.4 INJECTION, SOLUTION INTRAMUSCULAR; INTRAVENOUS; SUBCUTANEOUS
Status: DISCONTINUED | OUTPATIENT
Start: 2018-11-20 | End: 2018-11-21 | Stop reason: HOSPADM

## 2018-11-20 RX ORDER — GLYCOPYRROLATE 0.2 MG/ML
INJECTION, SOLUTION INTRAMUSCULAR; INTRAVENOUS PRN
Status: DISCONTINUED | OUTPATIENT
Start: 2018-11-20 | End: 2018-11-20

## 2018-11-20 RX ORDER — AMOXICILLIN 250 MG
1 CAPSULE ORAL 2 TIMES DAILY
Status: DISCONTINUED | OUTPATIENT
Start: 2018-11-20 | End: 2018-11-21 | Stop reason: HOSPADM

## 2018-11-20 RX ORDER — METOPROLOL TARTRATE 1 MG/ML
1-2 INJECTION, SOLUTION INTRAVENOUS EVERY 5 MIN PRN
Status: DISCONTINUED | OUTPATIENT
Start: 2018-11-20 | End: 2018-11-20 | Stop reason: HOSPADM

## 2018-11-20 RX ORDER — HYDROMORPHONE HYDROCHLORIDE 1 MG/ML
.3-.5 INJECTION, SOLUTION INTRAMUSCULAR; INTRAVENOUS; SUBCUTANEOUS
Status: DISCONTINUED | OUTPATIENT
Start: 2018-11-20 | End: 2018-11-21 | Stop reason: HOSPADM

## 2018-11-20 RX ORDER — LIDOCAINE 40 MG/G
CREAM TOPICAL
Status: DISCONTINUED | OUTPATIENT
Start: 2018-11-20 | End: 2018-11-20 | Stop reason: HOSPADM

## 2018-11-20 RX ORDER — CEFAZOLIN SODIUM 1 G/3ML
1 INJECTION, POWDER, FOR SOLUTION INTRAMUSCULAR; INTRAVENOUS SEE ADMIN INSTRUCTIONS
Status: DISCONTINUED | OUTPATIENT
Start: 2018-11-20 | End: 2018-11-20 | Stop reason: HOSPADM

## 2018-11-20 RX ORDER — ATENOLOL 25 MG/1
12.5 TABLET ORAL DAILY
Status: DISCONTINUED | OUTPATIENT
Start: 2018-11-21 | End: 2018-11-21 | Stop reason: HOSPADM

## 2018-11-20 RX ORDER — CHLORTHALIDONE 25 MG/1
25 TABLET ORAL DAILY
Status: DISCONTINUED | OUTPATIENT
Start: 2018-11-21 | End: 2018-11-21

## 2018-11-20 RX ORDER — BUPIVACAINE HYDROCHLORIDE AND EPINEPHRINE 5; 5 MG/ML; UG/ML
INJECTION, SOLUTION PERINEURAL PRN
Status: DISCONTINUED | OUTPATIENT
Start: 2018-11-20 | End: 2018-11-20 | Stop reason: HOSPADM

## 2018-11-20 RX ORDER — OXYCODONE HCL 10 MG/1
10 TABLET, FILM COATED, EXTENDED RELEASE ORAL EVERY 12 HOURS
Status: DISCONTINUED | OUTPATIENT
Start: 2018-11-20 | End: 2018-11-20

## 2018-11-20 RX ADMIN — SODIUM CHLORIDE, POTASSIUM CHLORIDE, SODIUM LACTATE AND CALCIUM CHLORIDE: 600; 310; 30; 20 INJECTION, SOLUTION INTRAVENOUS at 09:30

## 2018-11-20 RX ADMIN — GLYCOPYRROLATE 0.2 MG: 0.2 INJECTION, SOLUTION INTRAMUSCULAR; INTRAVENOUS at 09:40

## 2018-11-20 RX ADMIN — SENNOSIDES AND DOCUSATE SODIUM 2 TABLET: 8.6; 5 TABLET ORAL at 19:36

## 2018-11-20 RX ADMIN — SODIUM CHLORIDE, POTASSIUM CHLORIDE, SODIUM LACTATE AND CALCIUM CHLORIDE: 600; 310; 30; 20 INJECTION, SOLUTION INTRAVENOUS at 08:15

## 2018-11-20 RX ADMIN — Medication 1 G: at 10:00

## 2018-11-20 RX ADMIN — HYDROCODONE BITARTRATE AND ACETAMINOPHEN 2 TABLET: 5; 325 TABLET ORAL at 19:35

## 2018-11-20 RX ADMIN — CEFAZOLIN SODIUM 2 G: 2 INJECTION, SOLUTION INTRAVENOUS at 08:00

## 2018-11-20 RX ADMIN — HYDROCODONE BITARTRATE AND ACETAMINOPHEN 1 TABLET: 5; 325 TABLET ORAL at 15:40

## 2018-11-20 RX ADMIN — HYDROCODONE BITARTRATE AND ACETAMINOPHEN 2 TABLET: 5; 325 TABLET ORAL at 23:58

## 2018-11-20 RX ADMIN — PROPOFOL 150 MG: 10 INJECTION, EMULSION INTRAVENOUS at 07:37

## 2018-11-20 RX ADMIN — CEFAZOLIN SODIUM 1 G: 1 INJECTION, SOLUTION INTRAVENOUS at 15:43

## 2018-11-20 RX ADMIN — PHENYLEPHRINE HYDROCHLORIDE 100 MCG: 10 INJECTION, SOLUTION INTRAMUSCULAR; INTRAVENOUS; SUBCUTANEOUS at 08:46

## 2018-11-20 RX ADMIN — MIDAZOLAM 2 MG: 1 INJECTION INTRAMUSCULAR; INTRAVENOUS at 07:40

## 2018-11-20 RX ADMIN — Medication 2 MG: at 09:40

## 2018-11-20 RX ADMIN — ONDANSETRON 4 MG: 2 INJECTION INTRAMUSCULAR; INTRAVENOUS at 09:40

## 2018-11-20 RX ADMIN — PHENYLEPHRINE HYDROCHLORIDE 100 MCG: 10 INJECTION, SOLUTION INTRAMUSCULAR; INTRAVENOUS; SUBCUTANEOUS at 07:41

## 2018-11-20 RX ADMIN — SODIUM CHLORIDE, POTASSIUM CHLORIDE, SODIUM LACTATE AND CALCIUM CHLORIDE: 600; 310; 30; 20 INJECTION, SOLUTION INTRAVENOUS at 18:24

## 2018-11-20 RX ADMIN — PHENYLEPHRINE HYDROCHLORIDE 100 MCG: 10 INJECTION, SOLUTION INTRAMUSCULAR; INTRAVENOUS; SUBCUTANEOUS at 08:38

## 2018-11-20 RX ADMIN — Medication 1 G: at 08:05

## 2018-11-20 RX ADMIN — GLYCOPYRROLATE 0.2 MG: 0.2 INJECTION, SOLUTION INTRAMUSCULAR; INTRAVENOUS at 07:37

## 2018-11-20 RX ADMIN — ROCURONIUM BROMIDE 30 MG: 10 INJECTION INTRAVENOUS at 07:37

## 2018-11-20 RX ADMIN — SODIUM CHLORIDE, POTASSIUM CHLORIDE, SODIUM LACTATE AND CALCIUM CHLORIDE: 600; 310; 30; 20 INJECTION, SOLUTION INTRAVENOUS at 07:20

## 2018-11-20 RX ADMIN — DEXAMETHASONE SODIUM PHOSPHATE 4 MG: 4 INJECTION, SOLUTION INTRA-ARTICULAR; INTRALESIONAL; INTRAMUSCULAR; INTRAVENOUS; SOFT TISSUE at 07:37

## 2018-11-20 RX ADMIN — FENTANYL CITRATE 100 MCG: 50 INJECTION, SOLUTION INTRAMUSCULAR; INTRAVENOUS at 07:37

## 2018-11-20 RX ADMIN — SODIUM CHLORIDE, POTASSIUM CHLORIDE, SODIUM LACTATE AND CALCIUM CHLORIDE: 600; 310; 30; 20 INJECTION, SOLUTION INTRAVENOUS at 12:43

## 2018-11-20 ASSESSMENT — PAIN DESCRIPTION - DESCRIPTORS: DESCRIPTORS: SORE

## 2018-11-20 NOTE — BRIEF OP NOTE
[facility] Brief Operative Note    Pre-operative diagnosis: left rotator cuff tear glenohumeral osteoarthristis   Post-operative diagnosis same   Procedure: Procedure(s):  Left reverse total shoulder arthroplasty   Surgeon(s): Surgeon(s) and Role:     * Cricket Shah MD - Primary     * Milla Godfrey PA-C - Assisting     * Summer Neil MD - Assisting     * Isac Wilson PA-C - Resident - Observing   Estimated blood loss: 200 mL    Specimens: * No specimens in log *   Findings: See full operative note.     Plan:  NWB to LUE  Sling x 4 weeks  DVT prophylaxis - SCDs & Lovenox qday in hospital, then ASA x 4 wks   Ancef x 24 hours  PACU XRs  PT/OT  Keep dressing C/D/I for 2 weeks; okay to shower    F/U at clinic in 2 weeks

## 2018-11-20 NOTE — PLAN OF CARE
Problem: Surgery Nonspecified (Adult)  Goal: Signs and Symptoms of Listed Potential Problems Will be Absent, Minimized or Managed (Surgery Nonspecified)  Signs and symptoms of listed potential problems will be absent, minimized or managed by discharge/transition of care (reference Surgery Nonspecified (Adult) CPG).   Outcome: Improving  Ao, vss-2l nc. Dressing CDI- n/t, +2. Minimal pain managed ice. Up with sba, in chair. DTV

## 2018-11-20 NOTE — ADDENDUM NOTE
Addendum  created 11/20/18 1435 by Roland Zuluaga, DO    Anesthesia Review and Sign - Ready for Procedure, Anesthesia Review and Sign - Signed, Sign clinical note

## 2018-11-20 NOTE — ANESTHESIA CARE TRANSFER NOTE
Patient: Aaliyah Hayes    Procedure(s):  Left reverse total shoulder arthroplasty    Diagnosis: left rotator cuff tear glenohumeral osteoarthristis  Diagnosis Additional Information: No value filed.    Anesthesia Type:   General, ETT, Periph. Nerve Block for postop pain     Note:  Airway :Face Mask  Patient transferred to:PACU  Comments: Pt SV good tidal volume, op sxn cuff down extubated.  Transfer to pacu.  Report to PACU RN transfer care. Handoff Report: Identifed the Patient, Identified the Reponsible Provider, Reviewed the pertinent medical history, Discussed the surgical course, Reviewed Intra-OP anesthesia mangement and issues during anesthesia, Set expectations for post-procedure period and Allowed opportunity for questions and acknowledgement of understanding      Vitals: (Last set prior to Anesthesia Care Transfer)    CRNA VITALS  11/20/2018 0941 - 11/20/2018 1017      11/20/2018             Pulse: 100    SpO2: 100 %                Electronically Signed By: PANKAJ Roy CRNA  November 20, 2018  10:17 AM

## 2018-11-20 NOTE — OP NOTE
Procedure Date: 11/20/2018      PREOPERATIVE DIAGNOSIS:  Left shoulder rotator cuff arthropathy.      POSTOPERATIVE DIAGNOSIS:  Left shoulder rotator cuff arthropathy.      PROCEDURE:  Left reverse total shoulder arthroplasty.      SURGEONS:  Cricket Shah MD and Summer Neil MD      ASSISTANTS:  Milla Godfrey PA-C and Isac Wilson PA-C      Skilled assistants were necessary for patient positioning, helping with retraction, help with reduction, help with cutting as well as position of implants and holding the arm.      ESTIMATED BLOOD LOSS:  200 mL.      ANESTHESIA:  General and interscalene block.      COMPLICATIONS:  None apparent.      IMPLANTS:   1.  Tornier Aequalis Perform plus reversed full wedge augment base plate, size 25 mm.   2.  Tornier Aequalis Perform reversed standard glenosphere size 36 mm.   3.  Tornier Flex shoulder system reversed insert poly size +6 mm angle B.   4.  Tornier Flex shoulder system reversed tray low eccentricity thickness +0.   5.  Tornier Aequalis Ascend Flex standard humeral stem size 2B.      INDICATIONS:  The patient is a 68-year-old female who has been complaining of left shoulder pain for several years.  She saw one of my colleagues, Dr. Neil, who referred her to me for further discussion of reverse shoulder arthroplasty given she had a massive irreparable rotator cuff tear as well as DJD of the glenohumeral joint.  After discussion of the treatment options with the patient, we decided the best way to move forward would be a reverse shoulder arthroplasty and she agreed to proceed.      DESCRIPTION OF PROCEDURE:  On the date of the procedure, the patient was met in the preoperative area by the surgeon and anesthesia team.  Her left shoulder was marked by the operative surgeon and informed consent was obtained.  Risks and benefits of the surgery were discussed with the patient including risk of bleeding, infection, damage to neurovascular structures, risk of  stiffness, blood clots as well as a fracture and failure of the implants, need for future revision and she understood and agreed to proceed.  Our anesthesia colleagues then performed an interscalene block.  She was then brought to the operating room, placed on the operating room table in supine position and general anesthesia was administered.  She was then positioned on the table with her left shoulder off the table for mobilization.  Her head was secured into position and all bony prominences were well padded.  We then prepped and draped the left shoulder and left upper extremity in the usual sterile fashion.  Preoperative antibiotics were given and preoperative tranexamic acid was given as well.        We began the procedure by injecting Marcaine with epinephrine in the deltopectoral interval.  We then performed sharp dissection through the deltopectoral interval.  Sharp dissection was carried down through the skin and subcutaneous tissue.  The cephalic vein was mobilized laterally and protected throughout the case.  We then placed the appropriate retractors deep to the deltoid and the pec.  The superior border of the pec was released.  The superior 1 cm above the pectoralis was released.  We then used a Davey elevator in order to get rid of the adhesions both in the subdeltoid groove area as well as in the subacromial area.  There was a significant amount of scar tissue from her old rotator cuff tear in that area as well, now was mobilized.  We then released the CA ligament and continued inferior release along the lateral aspect of the conjoined tendon.  We then placed the link retractor deep to the conjoined tendon and the glenoid and visualized the subscapularis.  There was a significant amount of bursa and scarring over the top of the subscapularis and that was removed using electrocautery.  Next, we used a #2 Ethibond in order to tenodese the biceps to the superior border of the pec and the biceps was then  released proximally.        We followed the biceps into the glenohumeral joint.  The remnant of the biceps was then removed.  The biceps was previously tenodesed at the level of the groove and it was completely removed.  Next, we tied the 3 sisters using #2 Ethibond and placed #2 Ethibond sutures through the subscapularis.  We then proceeded with a subscapularis peel, right off the lesser tuberosity.  We then externally rotated and flexed the arm in order to get excellent positioning and mobilization of the inferior capsule right off the humerus and were able to clear off the humerus.  We then used a rongeur in order to remove the osteophytes off the inferior aspect of the humerus and we proceeded with humeral preparation.  We made an anatomic head cut using an oscillating saw, then placed the canal finder into the appropriate position followed by sounders and then impacted up to a size 2 that had excellent fit with rotational stability.  A cup protector was then placed and we turned our attention back to the subscapularis step.        We mobilized the subscapularis and released adhesions both superiorly and inferiorly, making sure to protect the axillary nerve.  We then released the middle glenohumeral as well as the inferior glenohumeral ligament anteriorly allowing the subscapularis to be fully mobile.  We then tucked it into the subscapularis fossa.  Next, the anterior superior, posterior and inferior labrums were removed and cartilage was scraped off the glenoid.  A Tornier blueprint patient-specific guide was then placed in the correct position and a guidepin was drilled into the glenoid in the appropriate position.  We then overdrilled the glenoid pin and reamed in the appropriate position using the Perform plus reamer in order to improve the superior tilt as well as the posterior wear.  After we were happy with our reaming position, we overdrilled once again and the pin was then removed.  The hole was tapped  and the appropriately sized and size 25 baseplate was wedge augmented and put into correct position in the appropriate orientation.  This was secured into place and compressed using the large screw.  Three locking screws were then placed in order to lock in the construct and prevent any rotational instability.        Next, the glenosphere was sized and a size 36 glenosphere was impacted into position and screwed into position.  We then proceeded back to the humerus.  The trials were removed and everything was copiously irrigated.  We then made 3 drill holes through the lesser tuberosity and placed a #5 FiberWire in a nice looped fashion for later subscapularis repair.  We then impacted in the true implants that consisted of a 2B stem with a low eccentricity tray as well as the appropriate polyethylene.  The construct was then reduced appropriately without any issues.  The subscapularis was removed from the subscapularis fossa and after taking the patient through a full range of motion, we noticed that she had excellent stability and the subscapularis was then repaired using nice loop fashion.  Once we were happy with our subscapularis repair, we turned our attention to closure and the wound was copiously irrigated.  The deltopectoral interval was loosely approximated using 0 Vicryl followed by 2-0 Vicryls for deep dermals and a running 4-0 Monocryl for skin.  Sterile dressing was applied.  The patient was placed into an UltraSling and awakened from anesthesia in stable condition.         GIORGIO DUNCAN MD             D: 2018   T: 2018   MT: KYRIE      Name:     LILI LUTZ   MRN:      [id number]        Account:        [id number]   :      1950           Procedure Date: 2018      Document: [id number]

## 2018-11-20 NOTE — ANESTHESIA PREPROCEDURE EVALUATION
PAC NOTE:       ANESTHESIA PRE EVALUATION:  Anesthesia Evaluation     .             ROS/MED HX    ENT/Pulmonary:  - neg pulmonary ROS     Neurologic:  - neg neurologic ROS     Cardiovascular:     (+) Dyslipidemia, hypertension----. : . . . :. .       METS/Exercise Tolerance:     Hematologic:     (+) Anemia, -      Musculoskeletal:   (+) arthritis, , , -       GI/Hepatic:  - neg GI/hepatic ROS       Renal/Genitourinary:  - ROS Renal section negative       Endo: Comment: .Body mass index is 24.51 kg/(m^2).          Psychiatric:  - neg psychiatric ROS       Infectious Disease:  - neg infectious disease ROS       Malignancy:         Other: Comment: .Lab Test        11/19/18     10/11/18     11/10/17     09/27/17      --          07/05/17                       1309          0856          1110                             --           1808          WBC           --          9.8           --          8.9           --          7.3           HGB          14.2         14.8         12.3         8.8*           < >        11.3*         MCV           --          96            --          97            --          99            PLT           --          317           --          439           --          288            < > = values in this interval not displayed.                  Lab Test        11/19/18     10/11/18     06/23/18     09/27/17                       1309          0856          0939                            NA            --          133          135          138           POTASSIUM    3.5          3.5          4.0          4.0           CHLORIDE      --          97           98           103           CO2           --          27           27           26            BUN           --          13           17           13            CR           0.82         0.69         0.69         0.56          ANIONGAP      --          9            10           9             DARIUS           --          9.5          9.6          9.6            GLC           --          108*         85           99               - neg other ROS                 Physical Exam  Normal systems: cardiovascular, pulmonary and dental    Airway   Mallampati: II    Dental     Cardiovascular   Rhythm and rate: regular and normal      Pulmonary    breath sounds clear to auscultation             Anesthesia Plan      History & Physical Review  History and physical reviewed and following examination; no interval change.    ASA Status:  2 .        Plan for General, ETT and Periph. Nerve Block for postop pain with Intravenous induction. Maintenance will be Inhalation and Balanced.    PONV prophylaxis:  Ondansetron (or other 5HT-3) and Dexamethasone or Solumedrol       Postoperative Care  Postoperative pain management:  IV analgesics, Oral pain medications and Multi-modal analgesia.      Consents  Anesthetic plan, risks, benefits and alternatives discussed with:  Patient or representative..                            .

## 2018-11-20 NOTE — PHARMACY-ADMISSION MEDICATION HISTORY
Admission medication history interview completed by pre-admitting RN, Saira Echols. See EPIC admission navigator for allergy information, prior to admission medications and immunization status. No further clarification needed at this time.     Prior to Admission medications    Medication Sig Last Dose Taking? Auth Provider   Ascorbic Acid (VITAMIN C PO) Take 1,000 mg by mouth daily 11/19/2018 at Unknown time Yes Reported, Patient   ASPIRIN ADULT LOW STRENGTH PO Take 81 mg by mouth 11/12/2018 at Unknown time Yes Reported, Patient   atenolol (TENORMIN) 25 MG tablet Take 0.5 tablets (12.5 mg) by mouth daily 11/20/2018 at Unknown time Yes Elizabeth Mcgowan MD   chlorthalidone (HYGROTON) 25 MG tablet TAKE ONE TABLET BY MOUTH ONCE DAILY 11/20/2018 at Unknown time Yes Jessie High MD   ferrous sulfate (SLO-FE) 142 (45 FE) MG TBCR Take 1 tablet (142 mg) by mouth 2 times daily 11/19/2018 at Unknown time Yes Jessie High MD   lisinopril (PRINIVIL/ZESTRIL) 10 MG tablet Take 1 tablet (10 mg) by mouth daily 11/19/2018 at Unknown time Yes Jessie High MD   multivitamin, therapeutic with minerals (MULTI-VITAMIN) TABS Take 1 tablet by mouth every morning  11/19/2018 at Unknown time Yes Jessie High MD   Omega-3 Fatty Acids (OMEGA 3 500) 500 MG CAPS Take 500 mg by mouth daily 11/12/2018 at Unknown time Yes Reported, Patient   potassium gluconate 2.5 MEQ TABS Take 5 mEq by mouth daily 11/19/2018 at Unknown time Yes Reported, Patient   valACYclovir (VALTREX) 1000 mg tablet Take 2 tablets (2,000 mg) by mouth 2 times daily As needed for herpetic ritesh Past Month at Unknown time Yes Jessie High MD   vitamin  B complex with vitamin C (VITAMIN  B COMPLEX) TABS Take 1 tablet by mouth daily 11/19/2018 at Unknown time Yes Jessie High MD   vitamin E 50 UNIT/ML SOLN drops Take 100 Units by mouth daily  11/12/2018 at Unknown time Yes Reported, Patient   calcium  carbonate (OSCAL 500) 1250 (500 CA) MG TABS tablet Take 1,250 mg by mouth daily 11/19/2018  Reported, Patient

## 2018-11-20 NOTE — ANESTHESIA POSTPROCEDURE EVALUATION
Patient: Aaliyah Hayes    Procedure(s):  Left reverse total shoulder arthroplasty    Diagnosis:left rotator cuff tear glenohumeral osteoarthristis  Diagnosis Additional Information: Left shoulder rotator cuff arthropathy.     Anesthesia Type:  General, ETT, Periph. Nerve Block for postop pain    Note:  Anesthesia Post Evaluation    Patient location during evaluation: PACU  Patient participation: Able to fully participate in evaluation  Level of consciousness: awake  Pain management: adequate  Airway patency: patent  Cardiovascular status: acceptable  Respiratory status: acceptable  Hydration status: acceptable  PONV: controlled     Anesthetic complications: None          Last vitals:  Vitals:    11/20/18 1146 11/20/18 1209 11/20/18 1243   BP: 106/61 102/64 102/69   Pulse:      Resp: 18 16 17   Temp: 98  F (36.7  C) 98  F (36.7  C)    SpO2: 98% 98%          Electronically Signed By: Roland Zuluaga DO  November 20, 2018  1:13 PM

## 2018-11-20 NOTE — IP AVS SNAPSHOT
[facility]    [address]    [facility] [address]    Phone:  [phone]    Fax:  [phone]                                       After Visit Summary   11/20/2018    Aaliyah Hayes    MRN: [id number]           After Visit Summary Signature Page     I have received my discharge instructions, and my questions have been answered. I have discussed any challenges I see with this plan with the nurse or doctor.    ..........................................................................................................................................  Patient/Patient Representative Signature      ..........................................................................................................................................  Patient Representative Print Name and Relationship to Patient    ..................................................               ................................................  Date                                   Time    ..........................................................................................................................................  Reviewed by Signature/Title    ...................................................              ..............................................  Date                                               Time          22EPIC Rev 08/18

## 2018-11-20 NOTE — ANESTHESIA PROCEDURE NOTES
Peripheral nerve/Neuraxial procedure note : Interscalene and Brachial plexus  Pre-Procedure  Performed by ALISON ZULUAGA  Referred by LYNDON DUNCAN MD  Location: pre-op    Procedure Times:11/20/2018 7:11 AM and 11/20/2018 7:22 AM  Pre-Anesthestic Checklist: patient identified, IV checked, site marked, risks and benefits discussed, informed consent, monitors and equipment checked, pre-op evaluation, at physician/surgeon's request and post-op pain management    Timeout  Correct Patient: Yes   Correct Procedure: Yes   Correct Site: Yes   Correct Laterality: Yes   Correct Position: Yes   Site Marked: Yes   .   Procedure Documentation    .    Procedure:  left  Interscalene and Brachial plexus.  Local skin infiltrated with mL of 1% lidocaine.     Ultrasound used to identify targeted nerve, plexus, or vascular marker and placed a needle adjacent to it., Ultrasound was used to visualize the spread of the anesthetic in close proximity to the above stated nerve.   Patient Prep;mask, sterile gloves, povidone-iodine 7.5% surgical scrub.  Nerve Stim: Initial Level 1 mA. Lowest motor response mA..  Needle: insulated  Needle Length (Inches) 2  Insertion Method: Single Shot.       Assessment/Narrative  .  The placement was negative for: blood aspirated, painful injection and site bleeding.  Bolus given via needle..   Secured via.   Complications: none. Comments:  .The surgeon has given a verbal order transferring care of this patient to me for the performance of a regional analgesia block for post-op pain control. It is requested of me because I am uniquely trained and qualified to perform this block and the surgeon is neither trained nor qualified to perform this procedure.  .Brachial Plexus Block     Interscalene Approach    Medication Bupivicaine 0.75% 16cc + Lido 2% w/ epi 1:200k 4cc    ALONZO Zuluaga

## 2018-11-21 ENCOUNTER — APPOINTMENT (OUTPATIENT)
Dept: OCCUPATIONAL THERAPY | Facility: CLINIC | Age: 68
DRG: 483 | End: 2018-11-21
Attending: ORTHOPAEDIC SURGERY
Payer: MEDICARE

## 2018-11-21 VITALS
OXYGEN SATURATION: 95 % | DIASTOLIC BLOOD PRESSURE: 67 MMHG | RESPIRATION RATE: 18 BRPM | WEIGHT: 134 LBS | HEIGHT: 62 IN | BODY MASS INDEX: 24.66 KG/M2 | TEMPERATURE: 98.5 F | SYSTOLIC BLOOD PRESSURE: 115 MMHG | HEART RATE: 84 BPM

## 2018-11-21 LAB — HGB BLD-MCNC: 10.6 G/DL (ref 11.7–15.7)

## 2018-11-21 PROCEDURE — 25000132 ZZH RX MED GY IP 250 OP 250 PS 637: Performed by: PHYSICIAN ASSISTANT

## 2018-11-21 PROCEDURE — 25000128 H RX IP 250 OP 636: Performed by: PHYSICIAN ASSISTANT

## 2018-11-21 PROCEDURE — 97110 THERAPEUTIC EXERCISES: CPT | Mod: GO | Performed by: REHABILITATION PRACTITIONER

## 2018-11-21 PROCEDURE — A9270 NON-COVERED ITEM OR SERVICE: HCPCS | Performed by: PHYSICIAN ASSISTANT

## 2018-11-21 PROCEDURE — 97165 OT EVAL LOW COMPLEX 30 MIN: CPT | Mod: GO | Performed by: REHABILITATION PRACTITIONER

## 2018-11-21 PROCEDURE — 97535 SELF CARE MNGMENT TRAINING: CPT | Mod: GO | Performed by: REHABILITATION PRACTITIONER

## 2018-11-21 PROCEDURE — 99207 ZZC CONSULT E&M CHANGED TO INITIAL LEVEL: CPT | Performed by: INTERNAL MEDICINE

## 2018-11-21 PROCEDURE — 40000133 ZZH STATISTIC OT WARD VISIT: Performed by: REHABILITATION PRACTITIONER

## 2018-11-21 PROCEDURE — 36415 COLL VENOUS BLD VENIPUNCTURE: CPT | Performed by: PHYSICIAN ASSISTANT

## 2018-11-21 PROCEDURE — 99221 1ST HOSP IP/OBS SF/LOW 40: CPT | Performed by: INTERNAL MEDICINE

## 2018-11-21 PROCEDURE — 85018 HEMOGLOBIN: CPT | Performed by: PHYSICIAN ASSISTANT

## 2018-11-21 RX ORDER — ONDANSETRON 4 MG/1
4 TABLET, FILM COATED ORAL EVERY 8 HOURS PRN
Qty: 18 TABLET | Refills: 0 | Status: SHIPPED | OUTPATIENT
Start: 2018-11-21 | End: 2019-08-27

## 2018-11-21 RX ORDER — HYDROMORPHONE HYDROCHLORIDE 2 MG/1
2 TABLET ORAL
Status: DISCONTINUED | OUTPATIENT
Start: 2018-11-21 | End: 2018-11-21 | Stop reason: HOSPADM

## 2018-11-21 RX ORDER — HYDROCODONE BITARTRATE AND ACETAMINOPHEN 5; 325 MG/1; MG/1
1-2 TABLET ORAL EVERY 4 HOURS PRN
Qty: 30 TABLET | Refills: 0 | Status: SHIPPED | OUTPATIENT
Start: 2018-11-21 | End: 2019-08-27

## 2018-11-21 RX ORDER — AMOXICILLIN 250 MG
2 CAPSULE ORAL 2 TIMES DAILY
Qty: 100 TABLET | Refills: 0 | Status: SHIPPED | OUTPATIENT
Start: 2018-11-21 | End: 2019-08-27

## 2018-11-21 RX ORDER — ACETAMINOPHEN 325 MG/1
650 TABLET ORAL EVERY 6 HOURS PRN
Qty: 100 TABLET | Refills: 0 | Status: SHIPPED | OUTPATIENT
Start: 2018-11-21 | End: 2019-08-27

## 2018-11-21 RX ORDER — ASPIRIN 325 MG
325 TABLET ORAL
Qty: 60 TABLET | Refills: 0 | Status: SHIPPED | OUTPATIENT
Start: 2018-11-21 | End: 2019-08-27

## 2018-11-21 RX ADMIN — HYDROMORPHONE HYDROCHLORIDE 0.3 MG: 1 INJECTION, SOLUTION INTRAMUSCULAR; INTRAVENOUS; SUBCUTANEOUS at 07:17

## 2018-11-21 RX ADMIN — ENOXAPARIN SODIUM 40 MG: 40 INJECTION SUBCUTANEOUS at 09:43

## 2018-11-21 RX ADMIN — HYDROMORPHONE HYDROCHLORIDE 0.5 MG: 1 INJECTION, SOLUTION INTRAMUSCULAR; INTRAVENOUS; SUBCUTANEOUS at 02:32

## 2018-11-21 RX ADMIN — ONDANSETRON 4 MG: 2 INJECTION INTRAMUSCULAR; INTRAVENOUS at 07:16

## 2018-11-21 RX ADMIN — Medication 140 MG: at 09:10

## 2018-11-21 RX ADMIN — LISINOPRIL 10 MG: 10 TABLET ORAL at 09:10

## 2018-11-21 RX ADMIN — HYDROMORPHONE HYDROCHLORIDE 0.5 MG: 1 INJECTION, SOLUTION INTRAMUSCULAR; INTRAVENOUS; SUBCUTANEOUS at 09:43

## 2018-11-21 RX ADMIN — ONDANSETRON 4 MG: 2 INJECTION INTRAMUSCULAR; INTRAVENOUS at 13:51

## 2018-11-21 RX ADMIN — HYDROMORPHONE HYDROCHLORIDE 2 MG: 2 TABLET ORAL at 13:30

## 2018-11-21 RX ADMIN — SENNOSIDES AND DOCUSATE SODIUM 1 TABLET: 8.6; 5 TABLET ORAL at 09:10

## 2018-11-21 RX ADMIN — CEFAZOLIN SODIUM 1 G: 1 INJECTION, SOLUTION INTRAVENOUS at 00:02

## 2018-11-21 RX ADMIN — HYDROCODONE BITARTRATE AND ACETAMINOPHEN 2 TABLET: 5; 325 TABLET ORAL at 05:26

## 2018-11-21 RX ADMIN — Medication 12.5 MG: at 09:10

## 2018-11-21 NOTE — PLAN OF CARE
Problem: Patient Care Overview  Goal: Plan of Care/Patient Progress Review  OT- eval completed and treatment initiated. Patient was POD #1 for L reverse TSA. Prior to admission, patient was living alone in 2 story [facility], plans to discontinue to daughter's home initially for a few days. Patient was I with ADL's and IADL's prior to surgery.    Discharge Planner OT   Patient plan for discharge: home to dtr's   Current status:  Educated in shoulder precautions, NWB status, exercises following shoulder surgery, sling management, TSA handouts; advancement of activity following surgery, ADL management, driving readiness and pain management techniques, patient verbalized understanding and was engaged throughout instruction. Patient reporting pain increasing to 9/10 at start of session, RN provided IV pain meds, improved to 7/10. I with sit in bed to sit at EOB, SBA for sit<>stand, transfer to chair. After instruction, patient was min A to don/doff sling. Completed exercises for elbow to hand with full ROM in all motions.     PM:Throughout session, patient was observed to be completing bed mobility, ambulating in her room, toileting, managing sinkside cares independently- RN confirmed. After instruction, patient was SBA for TB dressing, including sling management with only random vc's for techniques or problem solving. Instructed in pendulum exercises, afterwards, completed with min vc's for technique, patient feels comfortable with all exercises. [place] through session, patient experienced n/v , Rn alerted and provided IV pain meds. Goals have been met, will discharge from OT services.   Barriers to return to prior living situation: lives alone, pain  Recommendations for discharge: home to daughters until patient is able to manage independently.   Rationale for recommendations: Pain is biggest limiting factor at this point, anticipate patient will meet goals once managed. Will continue with OT services, IP PT is not  indicated.   PM session; patient has met needed goals for safe discharge to her daughter's home, will discharge from OT services.        Entered by: Laura Jack 11/21/2018 8:13 AM       Occupational Therapy Discharge Summary    Reason for therapy discharge:    All goals and outcomes met, no further needs identified.    Progress towards therapy goal(s). See goals on Care Plan in [facility] electronic health record for goal details.  Goals met    Therapy recommendation(s):    No further therapy is recommended.

## 2018-11-21 NOTE — PROGRESS NOTES
11/21/18 0705   Quick Adds   Type of Visit Initial Occupational Therapy Evaluation   Living Environment   Lives With alone   Living Arrangements house  (2 level [facility])   Home Accessibility bed and bath are not on the first floor  (walk in shower)   Number of Stairs Within Home 12   Living Environment Comment plans to stay at [facility] home until Friday morning initially, then to return home alone   Self-Care   Dominant Hand right   Usual Activity Tolerance good   Current Activity Tolerance fair   Equipment Currently Used at Home none  (has along handle brush and reacher to use)   Activity/Exercise/Self-Care Comment Patient works part-time as a dental hygienist, I with ADL's and IADl's   Functional Level Prior   Ambulation 0-->independent   Transferring 0-->independent   Toileting 0-->independent   Bathing 0-->independent   Dressing 0-->independent   Eating 0-->independent   Communication 0-->understands/communicates without difficulty   Swallowing 0-->swallows foods/liquids without difficulty   Cognition 0 - no cognition issues reported   Fall history within last six months no   Which of the above functional risks had a recent onset or change? ambulation;transferring;toileting;bathing;dressing   General Information   Onset of Illness/Injury or Date of Surgery - Date 11/20/18   Referring Physician Dr. Shah   Patient/Family Goals Statement to return home after stay at [facility] home , with HC services   Additional Occupational Profile Info/Pertinent History of Current Problem Patient is POD #1 L reverse TSA   Precautions/Limitations other (see comments);fall precautions  (shoulder precautions, sling at all times)   Weight-Bearing Status - LUE nonweight-bearing   General Info Comments Per orders- AAROM- forward flexion 0-90, external rotation 0-30, pendulum exercises, elbow, wrist and hand AROM   Cognitive Status Examination   Orientation orientation to person, place and time   Level of Consciousness alert   Able to Follow  Commands WNL/WFL   Personal Safety (Cognitive) WNL/WFL   Memory intact   Attention No deficits were identified   Visual Perception   Visual Perception Wears glasses  (reading glasses)   Sensory Examination   Sensory Comments continued numbness in  L thumb only   Pain Assessment   Patient Currently in Pain Yes, see Vital Sign flowsheet  (ratin 9/10- IV meds provided, reduced to 7/10)   Integumentary/Edema   Integumentary/Edema no deficits were identifed   Posture   Posture not impaired   Range of Motion (ROM)   ROM Quick Adds No deficits were identified  (IN R UE)   Strength   Manual Muscle Testing Quick Adds No deficits were identified  (IN R UE)   Hand Strength   Hand Strength Comments intact   Muscle Tone Assessment   Muscle Tone Quick Adds No deficits were identified   Coordination   Upper Extremity Coordination No deficits were identified   Mobility   Bed Mobility Comments I from sit in bed to sit at EOB   Transfer Skill: Bed to Chair/Chair to Bed   Level of [place]: Bed to Chair (treatment initiated-defer to OT daily note for details)   Transfer Skill: Sit to Stand   Level of [place]: Sit/Stand stand-by assist   Physical Assist/Nonphysical Assist: Sit/Stand supervision   Transfer Skill: Sit to Stand nonweight-bearing  (to L UE)   Toilet Transfer   Toilet Transfer Comments patient reports completing without concerns   Balance   Balance Comments LOB was not noted, general unsteadiness to be expected   Upper Body Dressing   Level of [place]: Dress Upper Body (treatment initiated-defer to OT daily note for details)   Eating/Self Feeding   Level of [place]: Eating independent   Instrumental Activities of Daily Living (IADL)   IADL Comments plana to complete as able, dtr to A and patient to have home caers services at [place]   Activities of Daily Living Analysis   Impairments Contributing to Impaired Activities of Daily Living balance impaired;pain;post surgical precautions;ROM decreased;strength  "decreased   General Therapy Interventions   Planned Therapy Interventions ADL retraining;progressive activity/exercise;transfer training;home program guidelines;ROM   Clinical Impression   Criteria for Skilled Therapeutic Interventions Met yes, treatment indicated   OT Diagnosis decreased ADL's   Influenced by the following impairments balance impaired;pain;post surgical precautions;ROM decreased;strength decreased   Assessment of Occupational Performance 5 or more Performance Deficits   Identified Performance Deficits decreased ADL's and IADl's- dsg, toileting, bathing, community and functional mobility, household chores, driving, work duties   Clinical Decision Making (Complexity) Low complexity   Therapy Frequency daily   Predicted Duration of Therapy Intervention (days/wks) 2   Anticipated Discharge Disposition Home   Risks and Benefits of Treatment have been explained. Yes   Patient, Family & other staff in agreement with plan of care Yes   [facility] \"6 Clicks\"   2016, Trustees of [facility], under license to [employer].  All rights reserved.   6 Clicks Short Forms Daily Activity Inpatient Short Form   [facility]  \"6 Clicks\" Daily Activity Inpatient Short Form   1. Putting on and taking off regular lower body clothing? 3 - A Little   2. Bathing (including washing, rinsing, drying)? 3 - A Little   3. Toileting, which includes using toilet, bedpan or urinal? 3 - A Little   4. Putting on and taking off regular upper body clothing? 3 - A Little   5. Taking care of personal grooming such as brushing teeth? 3 - A Little   6. Eating meals? 4 - None   Daily Activity Raw Score (Score out of 24.Lower scores equate to lower levels of function) 19   Total Evaluation Time   Total Evaluation Time (Minutes) 10     "

## 2018-11-21 NOTE — PLAN OF CARE
Problem: Surgery Nonspecified (Adult)  Goal: Signs and Symptoms of Listed Potential Problems Will be Absent, Minimized or Managed (Surgery Nonspecified)  Signs and symptoms of listed potential problems will be absent, minimized or managed by discharge/transition of care (reference Surgery Nonspecified (Adult) CPG).   Outcome: Improving  Vital Signs: VSS. Afebrile.   Pain/Comfort: Pt taking IV dilaudid this morning, started oral dilaudid this afternoon.  Pt had a small emesis after oral pain meds, unsure if she kept down the dilaudid.  Zofran given for nausea.  Assessment: Left shoulder dressing c/d/i. No bruising noted. Good cap refill. Full sensation. Able to move fingers well.  Diet: Tolerating regular diet, except small emesis after PO dilaudid.  Output: Voiding well.  Activity/Ambulation: Moving well. Independent in room. Worked with OT x2.  Plan: hopes to discharge later this afternoon.

## 2018-11-21 NOTE — PLAN OF CARE
Problem: Patient Care Overview  Goal: Plan of Care/Patient Progress Review  Discharge Planner PT   Patient plan for discharge: To daughters home  Current status: Per OT note, pt SBA for functional transfers to chair. Per discussion with OT there are no skilled IP PT needs identified. Will complete PT order.   Barriers to return to prior living situation: Defer to OT  Recommendations for discharge: Per OT recommendations: To daughters house  Rationale for recommendations: No skilled IP PT needs identified. Will complete order.        Entered by: Brionna Rojas 11/21/2018 9:58 AM

## 2018-11-21 NOTE — CONSULTS
Care Transitions Team: Following for CC, discharge planning, and disposition.      Pt is being followed by CM of [facility] with plan to discharge home with assist of dtr with [facility]/Saturday  Current PT recommendation home to daughters until patient is able to manage independently  CM to follow to Please fax orders to [facility] /[phone] Both numbers and if POC changes please contact Harini Romo RN   BP  [facility] T: [phone]   C. [phone]     Jasmin Fuentes RN BSN CTS  Care Transitions Team  [phone]

## 2018-11-21 NOTE — PLAN OF CARE
Problem: Patient Care Overview  Goal: Plan of Care/Patient Progress Review  Has slept fair. Rated left shoulder pain as  6 to 8. Described it as throbbing.  Cap refill is brisk. Some numbness in left thumb. Freely wiggles finger without increased pain. Medicated twice with Norco and once with Dilaudid. Ice applied.Aquacell dressing intact with no drainage noted. Sling intact. HOB elevated. Tolerated crackers and water. IV infusing at 75 ml's/hr. Void X 2. Ambulated in the jackson.

## 2018-11-21 NOTE — PLAN OF CARE
Problem: Patient Care Overview  Goal: Plan of Care/Patient Progress Review  Outcome: Adequate for Discharge Date Met: 11/21/18  Patient discharged with daughter. AVS printed and reviewed. Follow up instructions given. Medications given to patient and instructed when to take. No further questions or concerns.

## 2018-11-21 NOTE — PROGRESS NOTES
CM: spoke with Harini Romo RN [facility]  [phone] at [facility] regarding pt discharging today. Plan according to [facility] was home with [facility] OT. CM noticed discharge orders are in for today, SUMEET was discharge home with daughter on Saturday with HC. Notified Harini of change and that there are NO home care orders. Harini stated she will reach out to the surgeon/PA for these orders.    Pt planning to discharge around 1630 today when daughter arrives.    Will fax home care orders if put in before 1600.     Elizabeth Singer RN, BSN CTS  Care Coordinator  [phone]     Update: orders faxed for home care to [facility]. Home care orders noted for PT not OT Harini notified via .

## 2018-11-21 NOTE — PLAN OF CARE
Problem: Patient Care Overview  Goal: Plan of Care/Patient Progress Review  Outcome: Therapy, progress toward functional goals as expected  Pt tolerating regular diet, up in room and chair with SBA, po analgesics for pain control along with rest, ice and elevation of limb, continue to monitor and provide for needs.

## 2018-11-21 NOTE — PROGRESS NOTES
Orthopedic Surgery  Aaliyah Hayes  2018  Admit Date:  2018  POD # 1  S/P left reverse total shoulder arthroplasty     Patient resting comfortably in bed.    Patient complains of increasing pain to left shoulder and states the Norco barely touches her pain.   Tolerating oral intake.    Denies nausea or vomiting.  Denies chest pain or shortness of breath.  No events overnight.       Alert and orient to person, place, and time.  Vital Sign Ranges  Temperature Temp  Av.3  F (36.8  C)  Min: 98  F (36.7  C)  Max: 98.5  F (36.9  C)   Blood pressure Systolic (24hrs), Av , Min:95 , Max:115        Diastolic (24hrs), Av, Min:56, Max:67      Pulse Pulse  Av.5  Min: 60  Max: 89   Respirations Resp  Av  Min: 14  Max: 20   Pulse oximetry SpO2  Av.4 %  Min: 92 %  Max: 100 %       Sling in place  Sensation intact in upper arm over biceps as well as in hand r/m/u nerve distribution  Able to abduct and oppose left thumb  +Radial pulse  +cap refill      Labs:  Recent Labs   Lab Test  18   1309  10/11/18   0856  18   0939   POTASSIUM  3.5  3.5  4.0     Recent Labs   Lab Test  18   0530  18   1309  10/11/18   0856   HGB  10.6*  14.2  14.8     No results for input(s): INR in the last 35240 hours.  Recent Labs   Lab Test  18   1241  10/11/18   0856 17   PLT  245  317  439       A/P  1. Plan   Continue Lovenox for DVT prophylaxis in hospital, then  mg BID x 4 weeks     Mobilize with PT/OT   NWB to RUE x 4 weeks   Sling in place   Keep dressing intact.    Continue current pain regiment - added oral Dilaudid for additional pain control     2. Disposition   Anticipate d/c to home this afternoon.     Milla Godfrey PA-C

## 2018-11-21 NOTE — CONSULTS
Consult Date:  11/21/2018      DATE OF CONSULTATION:  11/21/2018      REASON FOR CONSULTATION:  Medical evaluation in a postoperative patient.      REQUESTING PROVIDER:  Dr. Shah      BRIEF HISTORY:  Aaliyah Hayes is a 68-year-old female who is post left reverse total shoulder arthroplasty done by Dr. Shah.  The patient had outpatient preoperative evaluation done for surgery and anesthesia risk and cleared for yesterday's surgery.  The patient denied any cough, any fever.  She is actually getting ready for discharge.  No runny nose, no sore throat.  She had an episode of nausea and asked that if she could get some nausea medication upon discharge.      PAST MEDICAL HISTORY:   1.  Osteoarthritis.   2.  Hypertension.   3.  Hyperlipidemia.   4.  Allergic rhinitis.   5.  Insomnia.   6.  History of perforated sigmoid colon.      PAST SURGICAL HISTORY:  Appendectomy, colectomy, colonoscopy, rotator cuff repair, right hip replacement, ACL and MCL left knee repair.      HOME MEDICATIONS:  Reviewed and reconciled.   Prior to Admission Medications   Prescriptions Last Dose Informant Patient Reported? Taking?   ASPIRIN ADULT LOW STRENGTH PO 11/12/2018 at Unknown time  Yes No   Sig: Take 81 mg by mouth   Ascorbic Acid (VITAMIN C PO) 11/19/2018 at Unknown time Self Yes Yes   Sig: Take 1,000 mg by mouth daily   Omega-3 Fatty Acids (OMEGA 3 500) 500 MG CAPS 11/12/2018 at Unknown time  Yes Yes   Sig: Take 500 mg by mouth daily   atenolol (TENORMIN) 25 MG tablet 11/20/2018 at Unknown time  No Yes   Sig: Take 0.5 tablets (12.5 mg) by mouth daily   calcium carbonate (OSCAL 500) 1250 (500 CA) MG TABS tablet 11/19/2018  Yes No   Sig: Take 1,250 mg by mouth daily   chlorthalidone (HYGROTON) 25 MG tablet 11/20/2018 at Unknown time  No Yes   Sig: TAKE ONE TABLET BY MOUTH ONCE DAILY   ferrous sulfate (SLO-FE) 142 (45 FE) MG TBCR 11/19/2018 at Unknown time  No Yes   Sig: Take 1 tablet (142 mg) by mouth 2 times daily   lisinopril  (PRINIVIL/ZESTRIL) 10 MG tablet 11/19/2018 at Unknown time  No Yes   Sig: Take 1 tablet (10 mg) by mouth daily   multivitamin, therapeutic with minerals (MULTI-VITAMIN) TABS 11/19/2018 at Unknown time  Yes Yes   Sig: Take 1 tablet by mouth every morning    potassium gluconate 2.5 MEQ TABS 11/19/2018 at Unknown time  Yes Yes   Sig: Take 5 mEq by mouth daily   valACYclovir (VALTREX) 1000 mg tablet Past Month at Unknown time  No Yes   Sig: Take 2 tablets (2,000 mg) by mouth 2 times daily As needed for herpetic ritesh   vitamin  B complex with vitamin C (VITAMIN  B COMPLEX) TABS 11/19/2018 at Unknown time  Yes Yes   Sig: Take 1 tablet by mouth daily   vitamin E 50 UNIT/ML SOLN drops 11/12/2018 at Unknown time  Yes Yes   Sig: Take 100 Units by mouth daily       Facility-Administered Medications: None       ALLERGIES:  NO KNOWN DRUG ALLERGIES.      FAMILY HISTORY:  Reviewed and noncontributory.     REVIEW OF SYSTEMS:  Ten points reviewed, all are negative except those mentioned in HPI.      SOCIAL HISTORY:  The patient does not smoke.  She does not drink alcohol.  She does not use illicit drugs.      PHYSICAL EXAMINATION:   GENERAL:  The patient is awake, alert, oriented, comfortable, not in any form of distress.   VITAL SIGNS:  Blood pressure 115/67, pulse rate 84, temperature 98, oxygen saturation 95%.   HEENT:  Pink and anicteric.  Extraocular muscle movement intact.   NECK:  Supple, no JVD, no thyromegaly.   CHEST:  Good air entry bilaterally.  No wheezing, crackles or rales.   CARDIOVASCULAR:  S1, S2 were heard.  No gallop or murmur.   ABDOMEN:  Soft, nontender, nondistended, positive bowel sounds, no organomegaly.   LOWER EXTREMITIES:  No edema, cyanosis or clubbing.  Left shoulder area dressed and immobilized.      DIAGNOSTIC TESTS OF INTEREST:  Potassium 3.5, creatinine 0.8.  Hemoglobin was 14, prior to surgery it is 10.6 today.      ASSESSMENT:  Aaliyah Hayes is a 68-year-old female with past medical history  as outlined above, who was admitted post left shoulder reverse arthroplasty.  The patient has a smooth postoperative course.   1.  Postop day #1 status post left reverse total shoulder arthroplasty.   2.  Benign essential hypertension.   3.  Osteoarthritis.   4.  Acute blood loss anemia postop.      PLAN:  The patient is admitted under Orthopedic Service.  Pain is controlled.  She has episodes of nausea, which will be treated with nausea medication.  PT, OT will evaluate the patient.  When I saw the patient, actually the patient stated that she will be discharged this afternoon and I discussed with patient at length the plan of care.  All her questions and concerns were addressed.        Thank you for this consultation.  We will continue to follow up the patient while she is here.         DINORAH BOLDEN MD             D: 2018   T: 2018   MT: KYRIE      Name:     LILI LUTZ   MRN:      [id number]        Account:       [id number]   :      1950           Consult Date:  2018      Document: [id number]

## 2018-11-23 ENCOUNTER — TELEPHONE (OUTPATIENT)
Dept: PEDIATRICS | Facility: CLINIC | Age: 68
End: 2018-11-23

## 2018-11-23 LAB
BLD PROD TYP BPU: NORMAL
BLD PROD TYP BPU: NORMAL
BLD UNIT ID BPU: 0
BLD UNIT ID BPU: 0
BLOOD PRODUCT CODE: NORMAL
BLOOD PRODUCT CODE: NORMAL
BPU ID: NORMAL
BPU ID: NORMAL
TRANSFUSION STATUS PATIENT QL: NORMAL

## 2018-11-23 NOTE — TELEPHONE ENCOUNTER
ED / Discharge Outreach Protocol    Patient Contact    Attempt # 1    Was call answered?  No.  Left message on voicemail with information to call me back.  Will await her return phone call.    Kassie Kyle RN

## 2018-11-23 NOTE — TELEPHONE ENCOUNTER
Please contact patient for In-patient follow up.  [phone] (home) NONE (work)    Visit date: 11/20 -D/Cd 11/2021   Diagnosis listed:S/P Reverse Total Shoulder Arthroplasty, Left Rotator Cuff Tear Glenohumeral Osteoarthristis  Number of visits in past 12 months:ED 1/ IP 1

## 2018-11-26 NOTE — DISCHARGE SUMMARY
Orthopedic Discharge Summary   Patient: Aaliyah Hayes  Admission Date: 11/20/2018  Discharge Date: 11/21/2018  Date of Service: 11/26/2018  Attending Provider: No att. providers found  Admission Diagnosis: left rotator cuff tear glenohumeral osteoarthristis  S/p reverse total shoulder arthroplasty  Discharge Diagnosis: left rotator cuff tear glenohumeral osteoarthritis   Procedures Performed: left reverse total shoulder arthroplasty   Complications: None apparent     Past Medical History:   Past Medical History:   Diagnosis Date     Arthritis      Atypical squamous cells cannot exclude high grade squamous intraepithelial lesion on cytologic smear of cervix (ASC-H) 06/09/2017 06/09/17 ASC-H, Neg HPV, Endometrial cells present. [place]= SELENE 1, EMB= Benign     Facial cellulitis 11/22/2015     HTN (hypertension)      Perforated sigmoid colon (H) 1/6/2017     Patient Active Problem List   Diagnosis     Benign essential hypertension     Hyperlipidemia with target LDL less than 130     Insomnia     Allergic rhinitis due to other allergen     Atypical squamous cells cannot exclude high grade squamous intraepithelial lesion on cytologic smear of cervix (ASC-H)     Osteoarthritis of multiple joints     Status post total hip replacement, left     Herpetic ritesh     Anemia, unspecified type     History of bowel resection     S/p reverse total shoulder arthroplasty     Past Surgical History:   Procedure Laterality Date     APPENDECTOMY  2012     COLECTOMY LEFT N/A 1/6/2017    Procedure: COLECTOMY LEFT;  Surgeon: David Gaitan MD;  Location:  OR     COLONOSCOPY N/A 1/6/2017    Procedure: COLONOSCOPY;  Surgeon: Regis Mckoy MD;  Location:  GI     LAPAROSCOPIC ASSISTED SIGMOID COLECTOMY N/A 1/6/2017    Procedure: LAPAROSCOPIC ASSISTED SIGMOID COLECTOMY;  Surgeon: David Gaitan MD;  Location:  OR     ORTHOPEDIC SURGERY       REVERSE ARTHROPLASTY SHOULDER Left 11/20/2018    Procedure: Left reverse  total shoulder arthroplasty;  Surgeon: Cricket Shah MD;  Location:  OR     SURGICAL HISTORY OF -   09/2010    Rotator cuff repair-right shoulder     SURGICAL HISTORY OF -   12/2011    Right hip replacement     SURGICAL HISTORY OF -   2005    ACL and MCL left knee     Social History     Social History     Marital status:      Spouse name: N/A     Number of children: N/A     Years of education: N/A     Occupational History     Not on file.     Social History Main Topics     Smoking status: Never Smoker     Smokeless tobacco: Never Used     Alcohol use 0.0 oz/week     0 Standard drinks or equivalent per week      Comment: 1 glass of wine per night     Drug use: No     Sexual activity: No     Other Topics Concern     Not on file     Social History Narrative     Medications on admission:   No prescriptions prior to admission.     Allergies:    Allergies   Allergen Reactions     Blood Transfusion Related (Informational Only) Other (See Comments)     Patient has a history of a clinically significant antibody against RBC antigens.  A delay in compatible RBCs may occur.     Ciprofloxacin      Facial swelling, tingling on her upper lip, throat slightly closed, racing heart     Oxycodone      Nightmares, heart palpations     Sulfa Drugs      Eye reaction to sulfa-based eye drop       Hospital Course: Patient was admitted to Orthopedics and brought to the OR on 11/20/2018 where she underwent the above named procedure. Postoperatively she did very well with no apparent complications, and she had an uneventful hospital stay. Ancef was given for 24 hours after surgery. She was given Lovenox for DVT prophylaxis and her pain was well controlled with IV Dilaudid, then transitioned to oral pain medications during her hospital stay. She progressed well with physical therapy and discharge to home was recommended. Her chronic medical problems were followed by the medicine team during her hospital stay and there were no  significant changes to conditions or treatment plans. No new medical problems presented during her hospital stay.   Consultations Obtained During Hospitalization:  1. Internal medicine for management of comorbid conditions and home medication management.  2. Physical Therapy for gait training, mobilization, range of motion and strengthening exercises  3. Occupational Therapy for activities of daily living.  4. Social Work for disposition planning.  Active Problems:    S/p reverse total shoulder arthroplasty    Discharge Disposition: home  Discharge Diet: regular  Discharge Medications:   Discharge Medication List as of 11/21/2018  3:55 PM      START taking these medications    Details   acetaminophen (TYLENOL) 325 MG tablet Take 2 tablets (650 mg) by mouth every 6 hours as needed for mild pain, Disp-100 tablet, R-0, Local Print      aspirin 325 MG tablet Take 1 tablet (325 mg) by mouth 2 times daily, Disp-60 tablet, R-0, Local Print      HYDROcodone-acetaminophen (NORCO) 5-325 MG tablet Take 1-2 tablets by mouth every 4 hours as needed for pain, Disp-30 tablet, R-0, Local Print      ondansetron (ZOFRAN) 4 MG tablet Take 1 tablet (4 mg) by mouth every 8 hours as needed for nausea, Disp-18 tablet, R-0, E-Prescribe      senna-docusate (SENOKOT-S;PERICOLACE) 8.6-50 MG per tablet Take 2 tablets by mouth 2 times daily, Disp-100 tablet, R-0, Local Print         CONTINUE these medications which have NOT CHANGED    Details   Ascorbic Acid (VITAMIN C PO) Take 1,000 mg by mouth daily, Historical      atenolol (TENORMIN) 25 MG tablet Take 0.5 tablets (12.5 mg) by mouth daily, Disp-90 tablet, R-0, Local Print      calcium carbonate (OSCAL 500) 1250 (500 CA) MG TABS tablet Take 1,250 mg by mouth daily, Historical      chlorthalidone (HYGROTON) 25 MG tablet TAKE ONE TABLET BY MOUTH ONCE DAILY, Disp-90 tablet, R-3, E-Prescribe      ferrous sulfate (SLO-FE) 142 (45 FE) MG TBCR Take 1 tablet (142 mg) by mouth 2 times daily, Disp-60  tablet, R-1, E-PrescribePharmacist: may dispense either 142mg or 140mg salt form (45mg elemental is what is desired).      lisinopril (PRINIVIL/ZESTRIL) 10 MG tablet Take 1 tablet (10 mg) by mouth daily, Disp-90 tablet, R-3, E-PrescribeProfile Rx: patient will contact pharmacy when needed      multivitamin, therapeutic with minerals (MULTI-VITAMIN) TABS Take 1 tablet by mouth every morning , Disp-100 tablet, R-3, Historical      Omega-3 Fatty Acids (OMEGA 3 500) 500 MG CAPS Take 500 mg by mouth daily, Historical      potassium gluconate 2.5 MEQ TABS Take 5 mEq by mouth daily, Historical      valACYclovir (VALTREX) 1000 mg tablet Take 2 tablets (2,000 mg) by mouth 2 times daily As needed for herpetic ritesh, Disp-30 tablet, R-3, E-Prescribe      vitamin  B complex with vitamin C (VITAMIN  B COMPLEX) TABS Take 1 tablet by mouth daily, R-0, Historical      vitamin E 50 UNIT/ML SOLN drops Take 100 Units by mouth daily , Historical         STOP taking these medications       ASPIRIN ADULT LOW STRENGTH PO Comments:   Reason for Stopping:             Code Status: full  X-rays needed at the follow up visit: left shoulder  Milla Godfrey PA-C  11/26/2018 3:26 PM   [facility]   [phone]

## 2018-11-27 NOTE — TELEPHONE ENCOUNTER
ED / Discharge Outreach Protocol    Patient Contact    Attempt # 2    Was call answered?  No.  Left message on voicemail with information to call me back.    Génesis Yee RN -- [facility]

## 2018-11-27 NOTE — TELEPHONE ENCOUNTER
"Hospital/TCU/ED for chronic condition Discharge Protocol    \"Hi, my name is Génesis Yee, a registered nurse, and I am calling from [facility].  I am calling to follow up and see how things are going for you after your recent emergency visit/hospital/TCU stay.\"    Tell me how you are doing now that you are home?\" The Pt reports her pain is controlled well with current Norco regimen. No s/s of infection, home care is coming out tomorrow to change bandage. She will be following up with Ortho on 11/30. Overall doing very well. Taking Aspirin BID as directed.       Discharge Instructions    \"Let's review your discharge instructions.  What is/are the follow-up recommendations?  Pt. Response: Follow up with [facility]    \"Has an appointment with your primary care provider been scheduled?\"   Following up with Ortho    \"When you see the provider, I would recommend that you bring your medications with you.\"    Medications    \"Tell me what changed about your medicines when you discharged?\"    Changes to chronic meds?    0-1    \"What questions do you have about your medications?\"    None     New diagnoses of heart failure, COPD, diabetes, or MI?    No              Medication reconciliation completed? Yes  Was MTM referral placed (*Make sure to put transitions as reason for referral)?   No    Call Summary    \"What questions or concerns do you have about your recent visit and your follow-up care?\"     none    \"If you have questions or things don't continue to improve, we encourage you contact us through the main clinic number (give number).  Even if the clinic is not open, triage nurses are available 24/7 to help you.     We would like you to know that our clinic has extended hours (provide information).  We also have urgent care (provide details on closest location and hours/contact info)\"      \"Thank you for your time and take care!\"    Génesis Yee RN -- [facility]                  "

## 2018-11-30 ENCOUNTER — TRANSFERRED RECORDS (OUTPATIENT)
Dept: HEALTH INFORMATION MANAGEMENT | Facility: CLINIC | Age: 68
End: 2018-11-30

## 2019-01-02 ENCOUNTER — MYC MEDICAL ADVICE (OUTPATIENT)
Dept: PEDIATRICS | Facility: CLINIC | Age: 69
End: 2019-01-02

## 2019-01-02 DIAGNOSIS — I10 BENIGN ESSENTIAL HYPERTENSION: ICD-10-CM

## 2019-01-02 RX ORDER — ATENOLOL 25 MG/1
12.5 TABLET ORAL DAILY
Qty: 45 TABLET | Refills: 3 | Status: SHIPPED | OUTPATIENT
Start: 2019-01-02 | End: 2019-08-27

## 2019-01-02 NOTE — TELEPHONE ENCOUNTER
Atenolol 12.5 mg  Last Written Prescription Date:  10/11/18  Last Fill Quantity: 90,  # refills: 0   Last office visit: 11/2/2018 with prescribing provider:  11/02/18   Future Office Visit:      Patient has been on different doses of this medication over the past year  Last RXed while in the hospital.  BP Readings from Last 3 Encounters:   11/21/18 115/67   11/02/18 114/64   10/11/18 140/75   ZACHARY Alvares RN    Routing refill request to provider for review/approval because:  Drug not prescribed by MD in clinic.  Please review.  ZACHARY Alvares RN

## 2019-01-16 ENCOUNTER — OFFICE VISIT (OUTPATIENT)
Dept: OPTOMETRY | Facility: CLINIC | Age: 69
End: 2019-01-16
Payer: MEDICARE

## 2019-01-16 DIAGNOSIS — H52.203 HYPEROPIA OF BOTH EYES WITH ASTIGMATISM: Primary | ICD-10-CM

## 2019-01-16 DIAGNOSIS — H52.03 HYPEROPIA OF BOTH EYES WITH ASTIGMATISM: Primary | ICD-10-CM

## 2019-01-16 DIAGNOSIS — H52.4 PRESBYOPIA: ICD-10-CM

## 2019-01-16 PROCEDURE — 92015 DETERMINE REFRACTIVE STATE: CPT | Mod: GY | Performed by: OPTOMETRIST

## 2019-01-16 PROCEDURE — 92004 COMPRE OPH EXAM NEW PT 1/>: CPT | Performed by: OPTOMETRIST

## 2019-01-16 ASSESSMENT — TONOMETRY
OS_IOP_MMHG: 18
OD_IOP_MMHG: 18
IOP_METHOD: APPLANATION

## 2019-01-16 ASSESSMENT — CUP TO DISC RATIO
OD_RATIO: 0.45
OS_RATIO: 0.45

## 2019-01-16 ASSESSMENT — VISUAL ACUITY
CORRECTION_TYPE: GLASSES
OS_CC: 20/30-1
METHOD: SNELLEN - LINEAR
OD_CC+: -2
OD_SC: 20/20
OS_SC+: -1
OS_SC: 20/20
OD_CC: 20/20
OD_CC: 20/30-1
OS_CC: 20/20
OS_CC+: -1

## 2019-01-16 ASSESSMENT — REFRACTION_MANIFEST
OS_SPHERE: -0.75
OD_SPHERE: +0.75
METHOD_AUTOREFRACTION: 1
OD_CYLINDER: +0.50
OD_AXIS: 096
OS_CYLINDER: +1.25
OS_AXIS: 026
OD_AXIS: 081
OD_SPHERE: +0.25
OS_CYLINDER: +1.25
OD_ADD: +2.25
OS_AXIS: 039
OD_CYLINDER: +0.50
OS_ADD: +2.25
OS_SPHERE: -0.75

## 2019-01-16 ASSESSMENT — SLIT LAMP EXAM - LIDS: COMMENTS: NORMAL

## 2019-01-16 ASSESSMENT — REFRACTION_WEARINGRX
OS_AXIS: 36
OS_SPHERE: -0.50
OD_AXIS: 116
OD_ADD: +2.50
OD_CYLINDER: +1.00
OS_CYLINDER: +0.75
OD_SPHERE: -0.50
SPECS_TYPE: PAL
OS_ADD: +2.50

## 2019-01-16 ASSESSMENT — EXTERNAL EXAM - LEFT EYE: OS_EXAM: NORMAL

## 2019-01-16 ASSESSMENT — CONF VISUAL FIELD
OS_NORMAL: 1
OD_NORMAL: 1
METHOD: COUNTING FINGERS

## 2019-01-16 ASSESSMENT — EXTERNAL EXAM - RIGHT EYE: OD_EXAM: NORMAL

## 2019-01-16 NOTE — LETTER
1/16/2019         RE: Aaliyah Hayes  [address]  [place] [address]        Dear Colleague,    Thank you for referring your patient, Aaliyah Hayes, to the [facility]. Please see a copy of my visit note below.    error    Chief Complaint   Patient presents with     Annual Eye Exam        Last Eye Exam: 6+ years  Dilated Previously: Yes    What are you currently using to see?  glasses  Wears sunglasses     Distance Vision Acuity: Satisfied with vision    Near Vision Acuity: Satisfied with vision while reading  with glasses    Eye Comfort: itchy during allergy season  Do you use eye drops? 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       OBJECTIVE: See Ophthalmology exam    ASSESSMENT:    ICD-10-CM    1. Hyperopia of both eyes with astigmatism H52.03     H52.203    2. Presbyopia H52.4       PLAN:   Updated progressive addition lens prescription   Discussed yearly eye exam    Rosa Bridges OD     Again, thank you for allowing me to participate in the care of your patient.        Sincerely,        Rosa Bridges, OD

## 2019-01-16 NOTE — PROGRESS NOTES
Chief Complaint   Patient presents with     Annual Eye Exam        Last Eye Exam: 6+ years  Dilated Previously: Yes    What are you currently using to see?  glasses  Wears sunglasses     Distance Vision Acuity: Satisfied with vision    Near Vision Acuity: Satisfied with vision while reading  with glasses    Eye Comfort: itchy during allergy season  Do you use eye drops? 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       OBJECTIVE: See Ophthalmology exam    ASSESSMENT:    ICD-10-CM    1. Hyperopia of both eyes with astigmatism H52.03     H52.203    2. Presbyopia H52.4       PLAN:   Updated progressive addition lens prescription   Discussed yearly eye exam    Rosa Bridges OD

## 2019-03-15 ENCOUNTER — TRANSFERRED RECORDS (OUTPATIENT)
Dept: HEALTH INFORMATION MANAGEMENT | Facility: CLINIC | Age: 69
End: 2019-03-15

## 2019-04-24 ENCOUNTER — TELEPHONE (OUTPATIENT)
Dept: PEDIATRICS | Facility: CLINIC | Age: 69
End: 2019-04-24

## 2019-04-24 DIAGNOSIS — E78.5 HYPERLIPIDEMIA WITH TARGET LDL LESS THAN 130: ICD-10-CM

## 2019-04-24 DIAGNOSIS — I10 BENIGN ESSENTIAL HYPERTENSION: Primary | ICD-10-CM

## 2019-04-24 DIAGNOSIS — D64.9 ANEMIA, UNSPECIFIED TYPE: ICD-10-CM

## 2019-04-24 NOTE — TELEPHONE ENCOUNTER
Reason for Call: Request for an order or referral:    Order or referral being requested: Lab orders for an upcoming physical on 8/27/19    Date needed: before my next appointment    Has the patient been seen by the PCP for this problem? YES    Additional comments: The pt sent a  message and she is wanting to have some lab work done prior if necessary. When I replied to her via mc I told her the care team would either call her or communicate via mc when the orders have been placed.     Phone number Patient can be reached at:  Cell number on file:    Telephone Information:   Mobile [phone]       Best Time:  Anytime    Can we leave a detailed message on this number?  YES    Call taken on 4/24/2019 at 3:10 PM by Evonen Pro

## 2019-04-25 RX ORDER — LISINOPRIL 10 MG/1
10 TABLET ORAL DAILY
Qty: 90 TABLET | Refills: 3 | Status: SHIPPED | OUTPATIENT
Start: 2019-04-25 | End: 2019-08-27

## 2019-04-25 NOTE — TELEPHONE ENCOUNTER
Ok to wait until august for labs, they don't have to be done in June.  Can schedule them a few days prior to her visit.  Jessie High MD

## 2019-04-25 NOTE — TELEPHONE ENCOUNTER
Physical scheduled on 08/27.  Labs will be due in June.  Orders pended, please sign if in agreement.  Thyroid and Vitamin B checked last year and were normal.  ZACHARY Alvares RN

## 2019-04-26 NOTE — TELEPHONE ENCOUNTER
This writer left detail message: labs do not need to be completed in June ok'd to come in a few days prior to physical  Shante Key MA// April 26, 2019 2:59 PM

## 2019-08-24 DIAGNOSIS — D64.9 ANEMIA, UNSPECIFIED TYPE: ICD-10-CM

## 2019-08-24 DIAGNOSIS — E78.5 HYPERLIPIDEMIA WITH TARGET LDL LESS THAN 130: ICD-10-CM

## 2019-08-24 DIAGNOSIS — I10 BENIGN ESSENTIAL HYPERTENSION: ICD-10-CM

## 2019-08-24 LAB
ALBUMIN SERPL-MCNC: 4.3 G/DL (ref 3.4–5)
ALP SERPL-CCNC: 59 U/L (ref 40–150)
ALT SERPL W P-5'-P-CCNC: 27 U/L (ref 0–50)
ANION GAP SERPL CALCULATED.3IONS-SCNC: 5 MMOL/L (ref 3–14)
AST SERPL W P-5'-P-CCNC: 23 U/L (ref 0–45)
BILIRUB SERPL-MCNC: 0.5 MG/DL (ref 0.2–1.3)
BUN SERPL-MCNC: 20 MG/DL (ref 7–30)
CALCIUM SERPL-MCNC: 9.2 MG/DL (ref 8.5–10.1)
CHLORIDE SERPL-SCNC: 101 MMOL/L (ref 94–109)
CHOLEST SERPL-MCNC: 241 MG/DL
CO2 SERPL-SCNC: 31 MMOL/L (ref 20–32)
CREAT SERPL-MCNC: 0.73 MG/DL (ref 0.52–1.04)
CREAT UR-MCNC: 66 MG/DL
FERRITIN SERPL-MCNC: 251 NG/ML (ref 8–252)
GFR SERPL CREATININE-BSD FRML MDRD: 85 ML/MIN/{1.73_M2}
GLUCOSE SERPL-MCNC: 105 MG/DL (ref 70–99)
HDLC SERPL-MCNC: 62 MG/DL
HGB BLD-MCNC: 14 G/DL (ref 11.7–15.7)
LDLC SERPL CALC-MCNC: 152 MG/DL
MICROALBUMIN UR-MCNC: 7 MG/L
MICROALBUMIN/CREAT UR: 10 MG/G CR (ref 0–25)
NONHDLC SERPL-MCNC: 179 MG/DL
POTASSIUM SERPL-SCNC: 3.8 MMOL/L (ref 3.4–5.3)
PROT SERPL-MCNC: 7.5 G/DL (ref 6.8–8.8)
SODIUM SERPL-SCNC: 137 MMOL/L (ref 133–144)
TRIGL SERPL-MCNC: 134 MG/DL

## 2019-08-24 PROCEDURE — 82043 UR ALBUMIN QUANTITATIVE: CPT | Performed by: INTERNAL MEDICINE

## 2019-08-24 PROCEDURE — 80053 COMPREHEN METABOLIC PANEL: CPT | Performed by: INTERNAL MEDICINE

## 2019-08-24 PROCEDURE — 85018 HEMOGLOBIN: CPT | Performed by: INTERNAL MEDICINE

## 2019-08-24 PROCEDURE — 80061 LIPID PANEL: CPT | Performed by: INTERNAL MEDICINE

## 2019-08-24 PROCEDURE — 36415 COLL VENOUS BLD VENIPUNCTURE: CPT | Performed by: INTERNAL MEDICINE

## 2019-08-24 PROCEDURE — 82728 ASSAY OF FERRITIN: CPT | Performed by: INTERNAL MEDICINE

## 2019-08-24 ASSESSMENT — ENCOUNTER SYMPTOMS
MYALGIAS: 1
FREQUENCY: 0
SORE THROAT: 0
DIZZINESS: 0
HEARTBURN: 0
PARESTHESIAS: 1
PALPITATIONS: 0
DYSURIA: 0
NERVOUS/ANXIOUS: 0
FEVER: 0
JOINT SWELLING: 1
SHORTNESS OF BREATH: 0
WEAKNESS: 0
NAUSEA: 0
HEMATURIA: 0
EYE PAIN: 0
HEMATOCHEZIA: 0
DIARRHEA: 0
BREAST MASS: 0
ABDOMINAL PAIN: 0
COUGH: 0
ARTHRALGIAS: 1
CHILLS: 0
HEADACHES: 0
CONSTIPATION: 0

## 2019-08-24 ASSESSMENT — ACTIVITIES OF DAILY LIVING (ADL): CURRENT_FUNCTION: NO ASSISTANCE NEEDED

## 2019-08-27 ENCOUNTER — OFFICE VISIT (OUTPATIENT)
Dept: PEDIATRICS | Facility: CLINIC | Age: 69
End: 2019-08-27
Payer: MEDICARE

## 2019-08-27 VITALS
DIASTOLIC BLOOD PRESSURE: 60 MMHG | HEART RATE: 76 BPM | OXYGEN SATURATION: 99 % | WEIGHT: 134.4 LBS | TEMPERATURE: 98.2 F | HEIGHT: 62 IN | SYSTOLIC BLOOD PRESSURE: 102 MMHG | BODY MASS INDEX: 24.73 KG/M2

## 2019-08-27 DIAGNOSIS — Z12.4 SCREENING FOR CERVICAL CANCER: ICD-10-CM

## 2019-08-27 DIAGNOSIS — I10 BENIGN ESSENTIAL HYPERTENSION: ICD-10-CM

## 2019-08-27 DIAGNOSIS — Z78.0 ASYMPTOMATIC POSTMENOPAUSAL STATUS: ICD-10-CM

## 2019-08-27 DIAGNOSIS — Z12.31 VISIT FOR SCREENING MAMMOGRAM: ICD-10-CM

## 2019-08-27 DIAGNOSIS — Z00.00 ROUTINE GENERAL MEDICAL EXAMINATION AT A HEALTH CARE FACILITY: Primary | ICD-10-CM

## 2019-08-27 PROBLEM — D64.9 ANEMIA, UNSPECIFIED TYPE: Status: RESOLVED | Noted: 2018-06-26 | Resolved: 2019-08-27

## 2019-08-27 PROCEDURE — G0145 SCR C/V CYTO,THINLAYER,RESCR: HCPCS | Performed by: INTERNAL MEDICINE

## 2019-08-27 PROCEDURE — G0439 PPPS, SUBSEQ VISIT: HCPCS | Performed by: INTERNAL MEDICINE

## 2019-08-27 RX ORDER — ATENOLOL 25 MG/1
12.5 TABLET ORAL DAILY
Qty: 45 TABLET | Refills: 3 | Status: SHIPPED | OUTPATIENT
Start: 2019-08-27 | End: 2020-09-16

## 2019-08-27 RX ORDER — CHLORTHALIDONE 25 MG/1
25 TABLET ORAL DAILY
Qty: 90 TABLET | Refills: 3 | Status: SHIPPED | OUTPATIENT
Start: 2019-08-27 | End: 2020-07-31

## 2019-08-27 RX ORDER — LISINOPRIL 10 MG/1
10 TABLET ORAL DAILY
Qty: 90 TABLET | Refills: 3 | Status: SHIPPED | OUTPATIENT
Start: 2019-08-27 | End: 2020-11-05

## 2019-08-27 ASSESSMENT — ENCOUNTER SYMPTOMS
DIARRHEA: 0
PARESTHESIAS: 1
JOINT SWELLING: 1
SHORTNESS OF BREATH: 0
DYSURIA: 0
CHILLS: 0
HEMATOCHEZIA: 0
MYALGIAS: 1
ARTHRALGIAS: 1
NAUSEA: 0
PALPITATIONS: 0
BREAST MASS: 0
SORE THROAT: 0
COUGH: 0
FEVER: 0
EYE PAIN: 0
NERVOUS/ANXIOUS: 0
DIZZINESS: 0
WEAKNESS: 0
HEARTBURN: 0
CONSTIPATION: 0
HEMATURIA: 0
FREQUENCY: 0
ABDOMINAL PAIN: 0
HEADACHES: 0

## 2019-08-27 ASSESSMENT — ACTIVITIES OF DAILY LIVING (ADL): CURRENT_FUNCTION: NO ASSISTANCE NEEDED

## 2019-08-27 ASSESSMENT — MIFFLIN-ST. JEOR: SCORE: 1097.88

## 2019-08-27 NOTE — PROGRESS NOTES
"SUBJECTIVE:   Aaliyah Hayes is a 69 year old female who presents for Preventive Visit.    Are you in the first 12 months of your Medicare coverage?  No    Healthy Habits:     In general, how would you rate your overall health?  Good    Frequency of exercise:  2-3 days/week    Duration of exercise:  Other    Do you usually eat at least 4 servings of fruit and vegetables a day, include whole grains    & fiber and avoid regularly eating high fat or \"junk\" foods?  Yes    Taking medications regularly:  Yes    Medication side effects:  None    Ability to successfully perform activities of daily living:  No assistance needed    Home Safety:  No safety concerns identified    Hearing Impairment:  No hearing concerns    In the past 6 months, have you been bothered by leaking of urine?  No    In general, how would you rate your overall mental or emotional health?  Excellent      PHQ-2 Total Score: 0    Additional concerns today:  Yes    Would like to see a dermatologist for a skin check.        Do you feel safe in your environment? Yes    Do you have a Health Care Directive? Yes: Patient states has Advance Directive and will bring in a copy to clinic.      Fall risk       Cognitive Screening   1) Repeat 3 items (Leader, Season, Table)    2) Clock draw: NORMAL  3) 3 item recall: Recalls 2 objects   Results: NORMAL clock, 1-2 items recalled: COGNITIVE IMPAIRMENT LESS LIKELY    Mini-CogTM Copyright S Elham. Licensed by the author for use in [facility]; reprinted with permission (buck@.[facility]). All rights reserved.      Do you have sleep apnea, excessive snoring or daytime drowsiness?: no    Reviewed and updated as needed this visit by clinical staff         Reviewed and updated as needed this visit by Provider        Social History     Tobacco Use     Smoking status: Never Smoker     Smokeless tobacco: Never Used   Substance Use Topics     Alcohol use: Yes     Alcohol/week: 0.0 oz     Frequency: 4 or more " times a week     Drinks per session: 1 or 2     Binge frequency: Never     Comment: 1 glass of wine per night     If you drink alcohol do you typically have >3 drinks per day or >7 drinks per week? No    Alcohol Use 8/24/2019   Prescreen: >3 drinks/day or >7 drinks/week? No   Prescreen: >3 drinks/day or >7 drinks/week? -       Current providers sharing in care for this patient include:   Patient Care Team:  Jessie High MD as PCP - General (Pediatrics)  Jessie High MD as Assigned PCP    The following health maintenance items are reviewed in Epic and correct as of today:  Health Maintenance   Topic Date Due     DEXA  1950     ANNUAL REVIEW OF HM ORDERS  1950     ZOSTER IMMUNIZATION (2 of 3) 06/28/2013     FALL RISK ASSESSMENT  06/26/2019     MEDICARE ANNUAL WELLNESS VISIT  07/12/2019     HPV  07/12/2019     PAP  07/12/2019     INFLUENZA VACCINE (1) 09/01/2019     KRISTAN ASSESSMENT  11/02/2019     PHQ-9  11/02/2019     MAMMO SCREENING  08/23/2020     CMP  08/24/2020     LIPID  08/24/2020     MICROALBUMIN  08/24/2020     DTAP/TDAP/TD IMMUNIZATION (3 - Td) 06/14/2023     ADVANCE CARE PLANNING  11/21/2023     COLONOSCOPY  01/06/2027     HEPATITIS C SCREENING  Completed     PNEUMOCOCCAL IMMUNIZATION 65+ LOW/MEDIUM RISK  Completed     IPV IMMUNIZATION  Aged Out     MENINGITIS IMMUNIZATION  Aged Out     Lab work is in process  Labs reviewed in EPIC  BP Readings from Last 3 Encounters:   08/27/19 102/60   11/21/18 115/67   11/02/18 114/64    Wt Readings from Last 3 Encounters:   08/27/19 61 kg (134 lb 6.4 oz)   11/20/18 60.8 kg (134 lb)   11/02/18 60.8 kg (134 lb)                  Patient Active Problem List   Diagnosis     Benign essential hypertension     Hyperlipidemia with target LDL less than 130     Insomnia     Allergic rhinitis due to other allergen     Atypical squamous cells cannot exclude high grade squamous intraepithelial lesion on cytologic smear of cervix (ASC-H)      Osteoarthritis of multiple joints     Status post total hip replacement, left     Herpetic ritesh     History of bowel resection     S/p reverse total shoulder arthroplasty     Past Surgical History:   Procedure Laterality Date     APPENDECTOMY  2012     COLECTOMY LEFT N/A 1/6/2017    Procedure: COLECTOMY LEFT;  Surgeon: David Gaitan MD;  Location: [facility] OR     COLONOSCOPY N/A 1/6/2017    Procedure: COLONOSCOPY;  Surgeon: Regis Mckoy MD;  Location:  GI     LAPAROSCOPIC ASSISTED SIGMOID COLECTOMY N/A 1/6/2017    Procedure: LAPAROSCOPIC ASSISTED SIGMOID COLECTOMY;  Surgeon: David Gaitan MD;  Location:  OR     ORTHOPEDIC SURGERY       REVERSE ARTHROPLASTY SHOULDER Left 11/20/2018    Procedure: Left reverse total shoulder arthroplasty;  Surgeon: Cricket Shah MD;  Location:  OR     SURGICAL HISTORY OF -   09/2010    Rotator cuff repair-right shoulder     SURGICAL HISTORY OF -   12/2011    Right hip replacement     SURGICAL HISTORY OF -   2005    ACL and MCL left knee       Social History     Tobacco Use     Smoking status: Never Smoker     Smokeless tobacco: Never Used   Substance Use Topics     Alcohol use: Yes     Alcohol/week: 0.0 oz     Frequency: 4 or more times a week     Drinks per session: 1 or 2     Binge frequency: Never     Comment: 1 glass of wine per night     Family History   Problem Relation Age of Onset     Diabetes Mother 84        Type 2     Hypertension Mother      Alcohol/Drug Father      C.A.D. Paternal Grandmother      Colon Cancer No family hx of      Glaucoma No family hx of      Macular Degeneration No family hx of          Current Outpatient Medications   Medication Sig Dispense Refill     Ascorbic Acid (VITAMIN C PO) Take 1,000 mg by mouth daily       atenolol (TENORMIN) 25 MG tablet Take 0.5 tablets (12.5 mg) by mouth daily 45 tablet 3     calcium carbonate (OSCAL 500) 1250 (500 CA) MG TABS tablet Take 1,250 mg by mouth daily       chlorthalidone (HYGROTON) 25 MG  "tablet Take 1 tablet (25 mg) by mouth daily 90 tablet 3     lisinopril (PRINIVIL/ZESTRIL) 10 MG tablet Take 1 tablet (10 mg) by mouth daily 90 tablet 3     multivitamin, therapeutic with minerals (MULTI-VITAMIN) TABS Take 1 tablet by mouth every morning  100 tablet 3     Omega-3 Fatty Acids (OMEGA 3 500) 500 MG CAPS Take 500 mg by mouth daily       potassium gluconate 2.5 MEQ TABS Take 5 mEq by mouth daily       valACYclovir (VALTREX) 1000 mg tablet Take 2 tablets (2,000 mg) by mouth 2 times daily As needed for herpetic ritesh 30 tablet 3     vitamin  B complex with vitamin C (VITAMIN  B COMPLEX) TABS Take 1 tablet by mouth daily  0         Review of Systems   Constitutional: Negative for chills and fever.   HENT: Negative for congestion, ear pain, hearing loss and sore throat.    Eyes: Negative for pain and visual disturbance.   Respiratory: Negative for cough and shortness of breath.    Cardiovascular: Negative for chest pain, palpitations and peripheral edema.   Gastrointestinal: Negative for abdominal pain, constipation, diarrhea, heartburn, hematochezia and nausea.   Breasts:  Negative for tenderness, breast mass and discharge.   Genitourinary: Negative for dysuria, frequency, genital sores, hematuria, pelvic pain, urgency, vaginal bleeding and vaginal discharge.   Musculoskeletal: Positive for arthralgias, joint swelling and myalgias.   Skin: Negative for rash.   Neurological: Positive for paresthesias. Negative for dizziness, weakness and headaches.   Psychiatric/Behavioral: Negative for mood changes. The patient is not nervous/anxious.        OBJECTIVE:   /60 (BP Location: Right arm, Patient Position: Chair, Cuff Size: Adult Regular)   Pulse 76   Temp 98.2  F (36.8  C) (Oral)   Ht 1.575 m (5' 2\")   Wt 61 kg (134 lb 6.4 oz)   SpO2 99%   BMI 24.58 kg/m   Estimated body mass index is 24.51 kg/m  as calculated from the following:    Height as of 11/20/18: 1.575 m (5' 2\").    Weight as of 11/20/18: " 60.8 kg (134 lb).  Physical Exam  GENERAL:  alert and no distress  EYES: Eyes grossly normal to inspection, PERRL and conjunctivae and sclerae normal  HENT: ear canals and TM's normal, nose and mouth without ulcers or lesions  NECK: no adenopathy, no asymmetry, masses, or scars and thyroid normal to palpation  RESP: lungs clear to auscultation - no rales, rhonchi or wheezes  BREAST: normal without masses, tenderness or nipple discharge and no palpable axillary masses or adenopathy  CV: regular rate and rhythm, normal S1 S2, no S3 or S4, no murmur, click or rub, no peripheral edema and peripheral pulses strong  ABDOMEN: soft, nontender, no hepatosplenomegaly, no masses and bowel sounds normal   (female): normal female external genitalia, normal urethral meatus, vaginal mucosa pink, moist, well rugated, and normal cervix/adnexa/uterus without masses or discharge  MS: no gross musculoskeletal defects noted, no edema  SKIN: no suspicious lesions or rashes  NEURO: Normal strength and tone, mentation intact and speech normal  PSYCH: mentation appears normal, affect normal/bright    Diagnostic Test Results:  Labs reviewed in Epic  Results for orders placed or performed in visit on 08/24/19   Ferritin   Result Value Ref Range    Ferritin 251 8 - 252 ng/mL   Hemoglobin   Result Value Ref Range    Hemoglobin 14.0 11.7 - 15.7 g/dL   Albumin Random Urine Quantitative with Creat Ratio   Result Value Ref Range    Creatinine Urine 66 mg/dL    Albumin Urine mg/L 7 mg/L    Albumin Urine mg/g Cr 10.00 0 - 25 mg/g Cr   Lipid panel reflex to direct LDL Fasting   Result Value Ref Range    Cholesterol 241 (H) <200 mg/dL    Triglycerides 134 <150 mg/dL    HDL Cholesterol 62 >49 mg/dL    LDL Cholesterol Calculated 152 (H) <100 mg/dL    Non HDL Cholesterol 179 (H) <130 mg/dL   **Comprehensive metabolic panel FUTURE 1yr   Result Value Ref Range    Sodium 137 133 - 144 mmol/L    Potassium 3.8 3.4 - 5.3 mmol/L    Chloride 101 94 - 109 mmol/L  "   Carbon Dioxide 31 20 - 32 mmol/L    Anion Gap 5 3 - 14 mmol/L    Glucose 105 (H) 70 - 99 mg/dL    Urea Nitrogen 20 7 - 30 mg/dL    Creatinine 0.73 0.52 - 1.04 mg/dL    GFR Estimate 85 >60 mL/min/[1.73_m2]    GFR Estimate If Black >90 >60 mL/min/[1.73_m2]    Calcium 9.2 8.5 - 10.1 mg/dL    Bilirubin Total 0.5 0.2 - 1.3 mg/dL    Albumin 4.3 3.4 - 5.0 g/dL    Protein Total 7.5 6.8 - 8.8 g/dL    Alkaline Phosphatase 59 40 - 150 U/L    ALT 27 0 - 50 U/L    AST 23 0 - 45 U/L       ASSESSMENT / PLAN:   (Z00.00) Routine general medical examination at a health care facility  (primary encounter diagnosis)  -reviewed labs with patient  -mammo, dexa ordered  -recommended shingles vaccine     (I10) Benign essential hypertension  -bp well controlled on current regimen of atenolol, lisinopril and chlorthalidone  -renal function and microalbumin are normal  -no change to medications   Plan: atenolol (TENORMIN) 25 MG tablet,         chlorthalidone (HYGROTON) 25 MG tablet,         lisinopril (PRINIVIL/ZESTRIL) 10 MG tablet           (Z12.4) Screening for cervical cancer  -continue pap smears due to SELENE 1 on biopsy in 2017  Plan: Pap imaged thin layer screen with HPV -         recommended age 30 - 65 years (select HPV order        below), HPV High Risk Types DNA Cervical       (Z78.0) Asymptomatic postmenopausal status  Plan: DX Hip/Pelvis/Spine       (Z12.31) Visit for screening mammogram  Plan: *MA Screening Digital Bilateral        End of Life Planning:  Patient currently has an advanced directive: Yes.  Practitioner is supportive of decision.    COUNSELING:  Reviewed preventive health counseling, as reflected in patient instructions       Regular exercise       Healthy diet/nutrition       Vision screening       Hearing screening       Dental care       Osteoporosis Prevention/Bone Health    Estimated body mass index is 24.51 kg/m  as calculated from the following:    Height as of 11/20/18: 1.575 m (5' 2\").    Weight as of " 11/20/18: 60.8 kg (134 lb).         reports that she has never smoked. She has never used smokeless tobacco.      Appropriate preventive services were discussed with this patient, including applicable screening as appropriate for cardiovascular disease, diabetes, osteopenia/osteoporosis, and glaucoma.  As appropriate for age/gender, discussed screening for colorectal cancer, prostate cancer, breast cancer, and cervical cancer. Checklist reviewing preventive services available has been given to the patient.    Reviewed patients plan of care and provided an AVS. The Intermediate Care Plan ( asthma action plan, low back pain action plan, and migraine action plan) for Aaliyah meets the Care Plan requirement. This Care Plan has been established and reviewed with the Patient.    Counseling Resources:  ATP IV Guidelines  Pooled Cohorts Equation Calculator  Breast Cancer Risk Calculator  FRAX Risk Assessment  ICSI Preventive Guidelines  Dietary Guidelines for Americans, 2010  USDA's MyPlate  ASA Prophylaxis  Lung CA Screening    The 10-year ASCVD risk score (Cher CHAMBERLAIN Jr., et al., 2013) is: 6.2%    Values used to calculate the score:      Age: 69 years      Sex: Female      Is Non- : No      Diabetic: No      Tobacco smoker: No      Systolic Blood Pressure: 102 mmHg      Is BP treated: Yes      HDL Cholesterol: 62 mg/dL      Total Cholesterol: 241 mg/dL      Jessie High MD  [facility]    Identified Health Risks:

## 2019-08-28 PROCEDURE — 87624 HPV HI-RISK TYP POOLED RSLT: CPT | Performed by: INTERNAL MEDICINE

## 2019-09-04 LAB
COPATH REPORT: NORMAL
PAP: NORMAL

## 2019-09-09 ENCOUNTER — ANCILLARY PROCEDURE (OUTPATIENT)
Dept: MAMMOGRAPHY | Facility: CLINIC | Age: 69
End: 2019-09-09
Attending: INTERNAL MEDICINE
Payer: MEDICARE

## 2019-09-09 DIAGNOSIS — Z12.31 VISIT FOR SCREENING MAMMOGRAM: ICD-10-CM

## 2019-09-09 PROCEDURE — 77067 SCR MAMMO BI INCL CAD: CPT | Mod: TC

## 2019-11-13 ENCOUNTER — ANCILLARY PROCEDURE (OUTPATIENT)
Dept: BONE DENSITY | Facility: CLINIC | Age: 69
End: 2019-11-13
Attending: INTERNAL MEDICINE
Payer: MEDICARE

## 2019-11-13 ENCOUNTER — ANCILLARY PROCEDURE (OUTPATIENT)
Dept: BONE DENSITY | Facility: CLINIC | Age: 69
End: 2019-11-13
Payer: MEDICARE

## 2019-11-13 DIAGNOSIS — Z78.0 ASYMPTOMATIC POSTMENOPAUSAL STATUS: ICD-10-CM

## 2019-11-13 PROCEDURE — 77080 DXA BONE DENSITY AXIAL: CPT | Performed by: FAMILY MEDICINE

## 2019-11-13 PROCEDURE — 77081 DXA BONE DENSITY APPENDICULR: CPT | Mod: 59 | Performed by: FAMILY MEDICINE

## 2020-02-24 ENCOUNTER — HEALTH MAINTENANCE LETTER (OUTPATIENT)
Age: 70
End: 2020-02-24

## 2020-07-22 DIAGNOSIS — I10 BENIGN ESSENTIAL HYPERTENSION: ICD-10-CM

## 2020-07-23 RX ORDER — CHLORTHALIDONE 25 MG/1
25 TABLET ORAL DAILY
Qty: 90 TABLET | Refills: 3 | OUTPATIENT
Start: 2020-07-23

## 2020-07-23 NOTE — TELEPHONE ENCOUNTER
The pt last had the medication refilled 8/27/19.  The pt is in [place] visiting her mother and she will call back at a later date to make an appointment. Please fill. Thank you.  Evonne Pro on 7/23/2020 at 11:49 AM

## 2020-07-23 NOTE — TELEPHONE ENCOUNTER
Patient given a year supply of medication on 8/27/20. Patient needs to establish care, encounter routed to MA/TC pool to schedule. Bryanna Hansen RN on 7/23/2020 at 8:20 AM

## 2020-07-31 ENCOUNTER — MYC REFILL (OUTPATIENT)
Dept: PEDIATRICS | Facility: CLINIC | Age: 70
End: 2020-07-31

## 2020-07-31 DIAGNOSIS — I10 BENIGN ESSENTIAL HYPERTENSION: ICD-10-CM

## 2020-08-03 ENCOUNTER — TELEPHONE (OUTPATIENT)
Dept: PEDIATRICS | Facility: CLINIC | Age: 70
End: 2020-08-03

## 2020-08-03 NOTE — TELEPHONE ENCOUNTER
Pt has physical set with Dr Jasmin Galvez 9/16/20; pt asking for lab orders to be entered for her usual fasting labs that she has had with Dr High, scheduled for 9/11/20 at [facility].  Pt ph# [phone].  Pt is Out of rx; please refill asap today.  Thank you.  chris

## 2020-08-03 NOTE — TELEPHONE ENCOUNTER
Routing refill request to provider for review/approval because:  Pt last saw Dr High     Has appt with you next month ok for RF    Sharon Gilmore RN

## 2020-08-03 NOTE — TELEPHONE ENCOUNTER
LVM for pt to call clinic.  Please schedule med check or physical with a provider accepting new patients when patient calls back.    Routing back to EA refill as an LAMONT Napoles     9:48 AM August 3, 2020

## 2020-08-03 NOTE — TELEPHONE ENCOUNTER
Patient has annual physical scheduled for 9/16 and she is coming in for lab appointment on 9/11. Patient is requesting lab orders for this appointment.

## 2020-08-03 NOTE — TELEPHONE ENCOUNTER
Order/Referral Request:  Who is requesting: Pt  Orders being requested: routine lab orders  Reason service is needed/diagnosis: Annual Physical/Est Care with new PCP  When are orders needed by: 9/10/2020  Has this been discussed with Provider: No  Does patient have a preference on a Group/Provider/Facility? [facility]  Does patient have an appointment scheduled: Yes - 9/11/20  Where to send Orders: into EPIC orders  Okay to leave detailed message?  No at Cell number on file:    Telephone Information:   Mobile [phone]       Routing

## 2020-08-05 RX ORDER — CHLORTHALIDONE 25 MG/1
25 TABLET ORAL DAILY
Qty: 90 TABLET | Refills: 0 | Status: SHIPPED | OUTPATIENT
Start: 2020-08-05 | End: 2021-12-03

## 2020-08-05 NOTE — TELEPHONE ENCOUNTER
Lab orders have been placed.  She can come in to do them any time that is convenient.   Medication sent through to preferred pharmacy.   Jasmin Galvez MD   Internal Medicine - Pediatrics

## 2020-08-13 DIAGNOSIS — I10 BENIGN ESSENTIAL HYPERTENSION: ICD-10-CM

## 2020-08-13 RX ORDER — CHLORTHALIDONE 25 MG/1
25 TABLET ORAL DAILY
Qty: 90 TABLET | Refills: 0 | Status: CANCELLED | OUTPATIENT
Start: 2020-08-13

## 2020-08-13 NOTE — TELEPHONE ENCOUNTER
I called and spoke to the pt and she picked up this medication a week ago. She doesn't need it refilled at this time.   Evonne Pro on 8/13/2020 at 12:01 PM

## 2020-08-13 NOTE — TELEPHONE ENCOUNTER
Please call patient and have them request a pharmacy to pharmacy transfer of medication.     Bryanna Hansen RN on 8/13/2020 at 10:29 AM

## 2020-09-11 ENCOUNTER — TELEPHONE (OUTPATIENT)
Dept: PEDIATRICS | Facility: CLINIC | Age: 70
End: 2020-09-11

## 2020-09-11 DIAGNOSIS — I10 BENIGN ESSENTIAL HYPERTENSION: Primary | ICD-10-CM

## 2020-09-11 DIAGNOSIS — I10 BENIGN ESSENTIAL HYPERTENSION: ICD-10-CM

## 2020-09-11 DIAGNOSIS — Z00.00 ROUTINE GENERAL MEDICAL EXAMINATION AT A HEALTH CARE FACILITY: ICD-10-CM

## 2020-09-11 DIAGNOSIS — Z00.00 ROUTINE GENERAL MEDICAL EXAMINATION AT A HEALTH CARE FACILITY: Primary | ICD-10-CM

## 2020-09-11 LAB
HBA1C MFR BLD: 5.4 % (ref 0–5.6)
HGB BLD-MCNC: 12.8 G/DL (ref 11.7–15.7)

## 2020-09-11 PROCEDURE — 80061 LIPID PANEL: CPT | Performed by: INTERNAL MEDICINE

## 2020-09-11 PROCEDURE — 85018 HEMOGLOBIN: CPT | Performed by: INTERNAL MEDICINE

## 2020-09-11 PROCEDURE — 83036 HEMOGLOBIN GLYCOSYLATED A1C: CPT | Performed by: INTERNAL MEDICINE

## 2020-09-11 PROCEDURE — 80053 COMPREHEN METABOLIC PANEL: CPT | Performed by: INTERNAL MEDICINE

## 2020-09-11 PROCEDURE — 36415 COLL VENOUS BLD VENIPUNCTURE: CPT | Performed by: INTERNAL MEDICINE

## 2020-09-12 LAB
ALBUMIN SERPL-MCNC: 4.6 G/DL (ref 3.4–5)
ALP SERPL-CCNC: 62 U/L (ref 40–150)
ALT SERPL W P-5'-P-CCNC: 35 U/L (ref 0–50)
ANION GAP SERPL CALCULATED.3IONS-SCNC: 7 MMOL/L (ref 3–14)
AST SERPL W P-5'-P-CCNC: 34 U/L (ref 0–45)
BILIRUB SERPL-MCNC: 0.5 MG/DL (ref 0.2–1.3)
BUN SERPL-MCNC: 27 MG/DL (ref 7–30)
CALCIUM SERPL-MCNC: 10 MG/DL (ref 8.5–10.1)
CHLORIDE SERPL-SCNC: 100 MMOL/L (ref 94–109)
CHOLEST SERPL-MCNC: 237 MG/DL
CO2 SERPL-SCNC: 27 MMOL/L (ref 20–32)
CREAT SERPL-MCNC: 1.13 MG/DL (ref 0.52–1.04)
GFR SERPL CREATININE-BSD FRML MDRD: 50 ML/MIN/{1.73_M2}
GLUCOSE SERPL-MCNC: 86 MG/DL (ref 70–99)
HDLC SERPL-MCNC: 53 MG/DL
LDLC SERPL CALC-MCNC: 121 MG/DL
NONHDLC SERPL-MCNC: 184 MG/DL
POTASSIUM SERPL-SCNC: 4 MMOL/L (ref 3.4–5.3)
PROT SERPL-MCNC: 8.2 G/DL (ref 6.8–8.8)
SODIUM SERPL-SCNC: 134 MMOL/L (ref 133–144)
TRIGL SERPL-MCNC: 317 MG/DL

## 2020-09-15 ASSESSMENT — ENCOUNTER SYMPTOMS
DIARRHEA: 0
CONSTIPATION: 0
FEVER: 0
JOINT SWELLING: 0
PALPITATIONS: 0
ARTHRALGIAS: 0
CHILLS: 0
DYSURIA: 0
MYALGIAS: 1
HEARTBURN: 0
HEADACHES: 0
NERVOUS/ANXIOUS: 0
SHORTNESS OF BREATH: 0
EYE PAIN: 0
SORE THROAT: 0
PARESTHESIAS: 1
FREQUENCY: 0
DIZZINESS: 0
NAUSEA: 0
BREAST MASS: 0
HEMATOCHEZIA: 0
HEMATURIA: 0
WEAKNESS: 0
COUGH: 0
ABDOMINAL PAIN: 0

## 2020-09-15 ASSESSMENT — ACTIVITIES OF DAILY LIVING (ADL): CURRENT_FUNCTION: NO ASSISTANCE NEEDED

## 2020-09-16 ENCOUNTER — OFFICE VISIT (OUTPATIENT)
Dept: PEDIATRICS | Facility: CLINIC | Age: 70
End: 2020-09-16
Payer: MEDICARE

## 2020-09-16 VITALS
DIASTOLIC BLOOD PRESSURE: 67 MMHG | WEIGHT: 137 LBS | TEMPERATURE: 97.5 F | BODY MASS INDEX: 25.06 KG/M2 | OXYGEN SATURATION: 88 % | SYSTOLIC BLOOD PRESSURE: 110 MMHG | HEART RATE: 93 BPM

## 2020-09-16 DIAGNOSIS — E78.5 HYPERLIPIDEMIA WITH TARGET LDL LESS THAN 130: ICD-10-CM

## 2020-09-16 DIAGNOSIS — I10 BENIGN ESSENTIAL HYPERTENSION: ICD-10-CM

## 2020-09-16 DIAGNOSIS — R87.611 ATYPICAL SQUAMOUS CELLS CANNOT EXCLUDE HIGH GRADE SQUAMOUS INTRAEPITHELIAL LESION ON CYTOLOGIC SMEAR OF CERVIX (ASC-H): ICD-10-CM

## 2020-09-16 DIAGNOSIS — Z00.00 ENCOUNTER FOR MEDICARE ANNUAL WELLNESS EXAM: Primary | ICD-10-CM

## 2020-09-16 DIAGNOSIS — Z01.411 ENCOUNTER FOR GYNECOLOGICAL EXAMINATION (GENERAL) (ROUTINE) WITH ABNORMAL FINDINGS: ICD-10-CM

## 2020-09-16 PROCEDURE — 90662 IIV NO PRSV INCREASED AG IM: CPT | Performed by: INTERNAL MEDICINE

## 2020-09-16 PROCEDURE — G0476 HPV COMBO ASSAY CA SCREEN: HCPCS | Performed by: INTERNAL MEDICINE

## 2020-09-16 PROCEDURE — G0439 PPPS, SUBSEQ VISIT: HCPCS | Performed by: INTERNAL MEDICINE

## 2020-09-16 PROCEDURE — G0145 SCR C/V CYTO,THINLAYER,RESCR: HCPCS | Performed by: INTERNAL MEDICINE

## 2020-09-16 PROCEDURE — G0008 ADMIN INFLUENZA VIRUS VAC: HCPCS | Performed by: INTERNAL MEDICINE

## 2020-09-16 RX ORDER — ATENOLOL 25 MG/1
12.5 TABLET ORAL DAILY
Qty: 45 TABLET | Refills: 3 | Status: SHIPPED | OUTPATIENT
Start: 2020-09-16 | End: 2021-08-31

## 2020-09-16 ASSESSMENT — ENCOUNTER SYMPTOMS
PARESTHESIAS: 1
DYSURIA: 0
HEADACHES: 0
CHILLS: 0
SHORTNESS OF BREATH: 0
JOINT SWELLING: 0
PALPITATIONS: 0
HEMATOCHEZIA: 0
SORE THROAT: 0
COUGH: 0
HEMATURIA: 0
DIARRHEA: 0
NERVOUS/ANXIOUS: 0
BREAST MASS: 0
NAUSEA: 0
ABDOMINAL PAIN: 0
MYALGIAS: 1
EYE PAIN: 0
DIZZINESS: 0
HEARTBURN: 0
ARTHRALGIAS: 0
FEVER: 0
FREQUENCY: 0
CONSTIPATION: 0
WEAKNESS: 0

## 2020-09-16 ASSESSMENT — ACTIVITIES OF DAILY LIVING (ADL): CURRENT_FUNCTION: NO ASSISTANCE NEEDED

## 2020-09-16 NOTE — PROGRESS NOTES
"SUBJECTIVE:   Aaliyah Hayes is a 70 year old female who presents for Preventive Visit.    Are you in the first 12 months of your Medicare coverage?  No    Healthy Habits:     In general, how would you rate your overall health?  Good    Frequency of exercise:  1 day/week    Duration of exercise:  15-30 minutes    Do you usually eat at least 4 servings of fruit and vegetables a day, include whole grains    & fiber and avoid regularly eating high fat or \"junk\" foods?  Yes    Taking medications regularly:  Yes    Medication side effects:  None    Ability to successfully perform activities of daily living:  No assistance needed    Home Safety:  No safety concerns identified    Hearing Impairment:  No hearing concerns    In the past 6 months, have you been bothered by leaking of urine?  No    In general, how would you rate your overall mental or emotional health?  Excellent      PHQ-2 Total Score: 0    Additional concerns today:  Yes    Do you feel safe in your environment? Yes    Have you ever done Advance Care Planning? (For example, a Health Directive, POLST, or a discussion with a medical provider or your loved ones about your wishes): Yes, advance care planning is on file.    Overall, she has been feeling well.  She was exercising regularly at first with stay at home orders in the spring, but has slowed down on exercise lately.  She is taking care of her 8-year-old granddaughter still has less time to do this.  However, she is planning to increase exercise because she knows this is what helps her feel better.    She does have a healthy diet, eats mostly vegetables, good at avoiding refined carbohydrates.  Over the summer she was having 1 cocktail per evening, but is switching this to red wine to avoid the sugar.  She does not drink more than 1 per evening, except very occasionally with friends.    No new health concerns today, overall has been well.    Fall risk  Fallen 2 or more times in the past year?: " No  Any fall with injury in the past year?: No  click delete button to remove this line now  Cognitive Screening   1) Repeat 3 items (Leader, Season, Table)    2) Clock draw: NORMAL  3) 3 item recall: Recalls 3 objects  Results: 3 items recalled: COGNITIVE IMPAIRMENT LESS LIKELY    Mini-CogTM Copyright S [employer]. Licensed by the author for use in [facility]; reprinted with permission (buck@[facility]). All rights reserved.      Do you have sleep apnea, excessive snoring or daytime drowsiness?: no    Reviewed and updated as needed this visit by clinical staff  Tobacco  Allergies  Meds  Problems  Med Hx  Surg Hx  Fam Hx  Soc Hx          Reviewed and updated as needed this visit by Provider  Tobacco  Allergies  Meds  Problems  Med Hx  Surg Hx  Fam Hx        Social History     Tobacco Use     Smoking status: Never Smoker     Smokeless tobacco: Never Used   Substance Use Topics     Alcohol use: Yes     Alcohol/week: 0.0 standard drinks     Frequency: 4 or more times a week     Drinks per session: 1 or 2     Binge frequency: Never     Comment: 1 glass of wine per night     If you drink alcohol do you typically have >3 drinks per day or >7 drinks per week? No    Alcohol Use 9/16/2020   Prescreen: >3 drinks/day or >7 drinks/week? -   Prescreen: >3 drinks/day or >7 drinks/week? No       Current providers sharing in care for this patient include:   Patient Care Team:  Jasmin Galvez MD as PCP - General (Pediatrics)  Jessie High MD as Assigned PCP    The following health maintenance items are reviewed in Epic and correct as of today:  Health Maintenance   Topic Date Due     ANNUAL REVIEW OF HM ORDERS  1950     ZOSTER IMMUNIZATION (2 of 3) 06/28/2013     FALL RISK ASSESSMENT  06/26/2019     MICROALBUMIN  08/24/2020     MAMMO SCREENING  09/09/2020     A1C  09/11/2021     CMP  09/11/2021     LIPID  09/11/2021     MEDICARE ANNUAL WELLNESS VISIT  09/16/2021     DTAP/TDAP/TD IMMUNIZATION  (3 - Td) 06/14/2023     DEXA  11/13/2024     ADVANCE CARE PLANNING  09/16/2025     COLORECTAL CANCER SCREENING  01/06/2027     HEPATITIS C SCREENING  Completed     PHQ-2  Completed     INFLUENZA VACCINE  Completed     PNEUMOCOCCAL IMMUNIZATION 65+ LOW/MEDIUM RISK  Completed     IPV IMMUNIZATION  Aged Out     MENINGITIS IMMUNIZATION  Aged Out     HEPATITIS B IMMUNIZATION  Aged Out     Labs reviewed in EPIC  BP Readings from Last 3 Encounters:   09/16/20 110/67   08/27/19 102/60   11/21/18 115/67    Wt Readings from Last 3 Encounters:   09/16/20 62.1 kg (137 lb)   08/27/19 61 kg (134 lb 6.4 oz)   11/20/18 60.8 kg (134 lb)                  Patient Active Problem List   Diagnosis     Benign essential hypertension     Hyperlipidemia with target LDL less than 130     Insomnia     Allergic rhinitis due to other allergen     Atypical squamous cells cannot exclude high grade squamous intraepithelial lesion on cytologic smear of cervix (ASC-H)     Osteoarthritis of multiple joints     Status post total hip replacement, left     Herpetic ritesh     History of bowel resection     S/p reverse total shoulder arthroplasty     Past Surgical History:   Procedure Laterality Date     APPENDECTOMY  2012     COLECTOMY LEFT N/A 1/6/2017    Procedure: COLECTOMY LEFT;  Surgeon: David Gaitan MD;  Location:  OR     COLONOSCOPY N/A 1/6/2017    Procedure: COLONOSCOPY;  Surgeon: Regis Mckoy MD;  Location:  GI     LAPAROSCOPIC ASSISTED SIGMOID COLECTOMY N/A 1/6/2017    Procedure: LAPAROSCOPIC ASSISTED SIGMOID COLECTOMY;  Surgeon: David Gaitan MD;  Location:  OR     ORTHOPEDIC SURGERY       REVERSE ARTHROPLASTY SHOULDER Left 11/20/2018    Procedure: Left reverse total shoulder arthroplasty;  Surgeon: Cricket Shah MD;  Location:  OR     SURGICAL HISTORY OF -   09/2010    Rotator cuff repair-right shoulder     SURGICAL HISTORY OF -   12/2011    Right hip replacement     SURGICAL HISTORY OF -   2005    ACL and MCL  left knee       Social History     Tobacco Use     Smoking status: Never Smoker     Smokeless tobacco: Never Used   Substance Use Topics     Alcohol use: Yes     Alcohol/week: 0.0 standard drinks     Frequency: 4 or more times a week     Drinks per session: 1 or 2     Binge frequency: Never     Comment: 1 glass of wine per night     Family History   Problem Relation Age of Onset     Diabetes Mother 84        Type 2     Hypertension Mother      Alcohol/Drug Father      C.A.D. Paternal Grandmother      Colon Cancer No family hx of      Glaucoma No family hx of      Macular Degeneration No family hx of          Mammogram Screening: Mammogram Screening: Patient over age 50, mutual decision to screen reflected in health maintenance.  Last 3 Pap and HPV Results:   PAP / HPV Latest Ref Rng & Units 8/28/2019 8/27/2019 7/12/2018   PAP - - NIL NIL   HPV 16 DNA NEG:Negative Negative - Negative   HPV 18 DNA NEG:Negative Negative - Negative   OTHER HR HPV NEG:Negative Negative - Negative       Review of Systems   Constitutional: Negative for chills and fever.   HENT: Negative for congestion, ear pain, hearing loss and sore throat.    Eyes: Negative for pain and visual disturbance.   Respiratory: Negative for cough and shortness of breath.    Cardiovascular: Negative for chest pain, palpitations and peripheral edema.   Gastrointestinal: Negative for abdominal pain, constipation, diarrhea, heartburn, hematochezia and nausea.   Breasts:  Negative for tenderness, breast mass and discharge.   Genitourinary: Negative for dysuria, frequency, genital sores, hematuria, pelvic pain, urgency, vaginal bleeding and vaginal discharge.   Musculoskeletal: Positive for myalgias. Negative for arthralgias and joint swelling.   Skin: Negative for rash.   Neurological: Positive for paresthesias. Negative for dizziness, weakness and headaches.   Psychiatric/Behavioral: Negative for mood changes. The patient is not nervous/anxious.   "    Constitutional, HEENT, cardiovascular, pulmonary, GI, , musculoskeletal, neuro, skin, endocrine and psych systems are negative, except as otherwise noted.    OBJECTIVE:   /67 (BP Location: Right arm, Cuff Size: Adult Regular)   Pulse 93   Temp 97.5  F (36.4  C) (Temporal)   Wt 62.1 kg (137 lb)   SpO2 (!) 88%   BMI 25.06 kg/m   Estimated body mass index is 25.06 kg/m  as calculated from the following:    Height as of 8/27/19: 1.575 m (5' 2\").    Weight as of this encounter: 62.1 kg (137 lb).  Physical Exam  GENERAL APPEARANCE: healthy, alert and no distress  EYES: Eyes grossly normal to inspection, PERRL and conjunctivae and sclerae normal  HENT: ear canals and TM's normal, nose and mouth without ulcers or lesions, oropharynx clear and oral mucous membranes moist  NECK: no adenopathy, no asymmetry, masses, or scars and thyroid normal to palpation  RESP: lungs clear to auscultation - no rales, rhonchi or wheezes  CV: regular rate and rhythm, normal S1 S2, no S3 or S4, no murmur, click or rub, no peripheral edema and peripheral pulses strong  ABDOMEN: soft, nontender, no hepatosplenomegaly, no masses and bowel sounds normal   (female): normal female external genitalia, normal urethral meatus, vaginal mucosal atrophy noted, normal cervix, adnexae, and uterus without masses or abnormal discharge  MS: no musculoskeletal defects are noted and gait is age appropriate without ataxia  SKIN: no suspicious lesions or rashes  NEURO: Normal strength and tone, sensory exam grossly normal, mentation intact and speech normal  PSYCH: mentation appears normal and affect normal/bright    Diagnostic Test Results:  Labs reviewed in Epic    ASSESSMENT / PLAN:   1. Encounter for Medicare annual wellness exam  Most 2 recent pap smears were normal, did have atypical cells on Pap 3 years ago.  Will repeat this year, with HPV and then can discuss whether to continue after 5 more years.  Mammogram normal last year.  Will move " "to every 2-year screening.  Reports she will not ever have another colonoscopy, she did have bowel perforation at the last one.  Not due until 2027.  - Pap imaged thin layer screen with HPV - recommended age 30 - 65 years (select HPV order below)    2. Benign essential hypertension  Well-controlled, no changes.  - atenolol (TENORMIN) 25 MG tablet; Take 0.5 tablets (12.5 mg) by mouth daily  Dispense: 45 tablet; Refill: 3    3. Encounter for gynecological examination (general) (routine) with abnormal findings   5. Atypical squamous cells cannot exclude high grade squamous intraepithelial lesion on cytologic smear of cervix (ASC-H)  Pap today.  - Pap imaged thin layer screen with HPV - recommended age 30 - 65 years (select HPV order below)    4. Hyperlipidemia with target LDL less than 130  Labs resulted this week, no need for further treatment at this time.  Will recheck next year.        COUNSELING:  Reviewed preventive health counseling, as reflected in patient instructions       Regular exercise       Healthy diet/nutrition       Vision screening       Immunizations    Vaccinated for: Influenza             Alcohol Use       Osteoporosis Prevention/Bone Health    Estimated body mass index is 25.06 kg/m  as calculated from the following:    Height as of 8/27/19: 1.575 m (5' 2\").    Weight as of this encounter: 62.1 kg (137 lb).        She reports that she has never smoked. She has never used smokeless tobacco.      Appropriate preventive services were discussed with this patient, including applicable screening as appropriate for cardiovascular disease, diabetes, osteopenia/osteoporosis, and glaucoma.  As appropriate for age/gender, discussed screening for colorectal cancer, prostate cancer, breast cancer, and cervical cancer. Checklist reviewing preventive services available has been given to the patient.    Reviewed patients plan of care and provided an AVS. The Basic Care Plan (routine screening as documented in " Health Maintenance) for Aaliyah meets the Care Plan requirement. This Care Plan has been established and reviewed with the Patient.    Counseling Resources:  ATP IV Guidelines  Pooled Cohorts Equation Calculator  Breast Cancer Risk Calculator  Breast Cancer: Medication to Reduce Risk  FRAX Risk Assessment  ICSI Preventive Guidelines  Dietary Guidelines for Americans, 2010  USDA's MyPlate  ASA Prophylaxis  Lung CA Screening    Jasmin Galvez MD  [facility]    Identified Health Risks:

## 2020-09-16 NOTE — PATIENT INSTRUCTIONS
Patient Education   Personalized Prevention Plan  You are due for the preventive services outlined below.  Your care team is available to assist you in scheduling these services.  If you have already completed any of these items, please share that information with your care team to update in your medical record.  Health Maintenance Due   Topic Date Due     ANNUAL REVIEW OF HM ORDERS  1950     Zoster (Shingles) Vaccine (2 of 3) 06/28/2013     FALL RISK ASSESSMENT  06/26/2019     Kidney Microalbumin Urine Test  08/24/2020     Flu Vaccine (1) 09/01/2020     Annual Wellness Visit  08/27/2020     Mammogram  09/09/2020           You can call the [facility] to make an appointment with Mehdi Kapoor DPM (Podiatry).

## 2020-09-22 LAB
COPATH REPORT: NORMAL
PAP: NORMAL

## 2020-11-04 DIAGNOSIS — I10 BENIGN ESSENTIAL HYPERTENSION: ICD-10-CM

## 2020-11-05 RX ORDER — LISINOPRIL 10 MG/1
10 TABLET ORAL DAILY
Qty: 90 TABLET | Refills: 3 | Status: SHIPPED | OUTPATIENT
Start: 2020-11-05 | End: 2021-11-09

## 2020-11-05 NOTE — TELEPHONE ENCOUNTER
Routing refill request to provider for review/approval because:  Labs out of range:  CR  A break in medication      Emile Messer RN, BSN

## 2020-11-05 NOTE — TELEPHONE ENCOUNTER
Refill sent.  She should come back to repeat the labs for kidney function, since the most recent test was slightly elevated.   Jasmin Galvez MD   Internal Medicine - Pediatrics

## 2020-11-06 DIAGNOSIS — I10 BENIGN ESSENTIAL HYPERTENSION: ICD-10-CM

## 2020-11-06 RX ORDER — CHLORTHALIDONE 25 MG/1
25 TABLET ORAL DAILY
Qty: 90 TABLET | Refills: 3 | Status: SHIPPED | OUTPATIENT
Start: 2020-11-06 | End: 2021-11-09

## 2020-11-06 NOTE — TELEPHONE ENCOUNTER
Refill completed.  It is important that she come to have her labs re-checked.   Jasmin Galvez MD   Internal Medicine - Pediatrics

## 2020-11-06 NOTE — TELEPHONE ENCOUNTER
Medication Question or Refill    Who is calling: Patient     What medication are you calling about (include dose and sig)?: chlorthalidone (HYGROTON) 25 MG tablet    Controlled Substance Agreement on file: No    Who prescribed the medication?: Jasmin Galvez    Do you need a refill? Yes: currently out of medication    When did you use the medication last? yesterday    Patient offered an appointment? No- last appointment on 09/16/2020 with PCP    Do you have any questions or concerns?  No    [facility]ed [facility]    Okay to leave a detailed message?: No at Cell number on file:    Telephone Information:   Mobile [phone]       Jemma Welch MA 2:49 PM 11/6/2020

## 2020-11-06 NOTE — TELEPHONE ENCOUNTER
1st attempt: left VM to return call. Please see message below and assist pt in scheduling a lab only appointment.    Jemma Welch MA 9:18 AM 11/6/2020

## 2020-11-09 RX ORDER — CHLORTHALIDONE 25 MG/1
25 TABLET ORAL DAILY
Qty: 90 TABLET | Refills: 0 | Status: CANCELLED | OUTPATIENT
Start: 2020-11-09

## 2020-11-09 NOTE — TELEPHONE ENCOUNTER
Routing refill request to provider for review/approval because:  Labs out of range:  CR    Bryanna Hansen RN on 11/9/2020 at 8:57 AM

## 2020-11-09 NOTE — TELEPHONE ENCOUNTER
Call to pt but no answer and no vm to leave a msg. Will attempt to call pt. Again tomorrow 11/10/20  Devika Corral MA on 11/9/2020 at 4:29 PM

## 2020-11-09 NOTE — TELEPHONE ENCOUNTER
I refilled this med last week. Can you call the patient to check to see she was able to fill it?  Thanks!

## 2020-12-13 ENCOUNTER — HEALTH MAINTENANCE LETTER (OUTPATIENT)
Age: 70
End: 2020-12-13

## 2021-03-19 ENCOUNTER — TRANSFERRED RECORDS (OUTPATIENT)
Dept: HEALTH INFORMATION MANAGEMENT | Facility: CLINIC | Age: 71
End: 2021-03-19

## 2021-08-31 ENCOUNTER — MYC MEDICAL ADVICE (OUTPATIENT)
Dept: PEDIATRICS | Facility: CLINIC | Age: 71
End: 2021-08-31

## 2021-08-31 DIAGNOSIS — I10 BENIGN ESSENTIAL HYPERTENSION: ICD-10-CM

## 2021-08-31 RX ORDER — ATENOLOL 25 MG/1
12.5 TABLET ORAL DAILY
Qty: 30 TABLET | Refills: 0 | Status: SHIPPED | OUTPATIENT
Start: 2021-08-31 | End: 2021-10-28

## 2021-08-31 NOTE — TELEPHONE ENCOUNTER
Patient has upcoming appointment with PCP 10/5, labs prior.     Sending in refill for patient to not go without medication.

## 2021-09-26 ENCOUNTER — HEALTH MAINTENANCE LETTER (OUTPATIENT)
Age: 71
End: 2021-09-26

## 2021-09-30 ENCOUNTER — TELEPHONE (OUTPATIENT)
Dept: PEDIATRICS | Facility: CLINIC | Age: 71
End: 2021-09-30

## 2021-09-30 ENCOUNTER — LAB (OUTPATIENT)
Dept: LAB | Facility: CLINIC | Age: 71
End: 2021-09-30
Payer: MEDICARE

## 2021-09-30 DIAGNOSIS — Z00.00 ENCOUNTER FOR MEDICARE ANNUAL WELLNESS EXAM: Primary | ICD-10-CM

## 2021-09-30 DIAGNOSIS — Z00.00 ENCOUNTER FOR MEDICARE ANNUAL WELLNESS EXAM: ICD-10-CM

## 2021-09-30 LAB — HBA1C MFR BLD: 5.5 % (ref 0–5.6)

## 2021-09-30 PROCEDURE — 83721 ASSAY OF BLOOD LIPOPROTEIN: CPT | Mod: 59

## 2021-09-30 PROCEDURE — 83036 HEMOGLOBIN GLYCOSYLATED A1C: CPT

## 2021-09-30 PROCEDURE — 80061 LIPID PANEL: CPT

## 2021-09-30 PROCEDURE — 82043 UR ALBUMIN QUANTITATIVE: CPT

## 2021-09-30 PROCEDURE — 36415 COLL VENOUS BLD VENIPUNCTURE: CPT

## 2021-09-30 PROCEDURE — 80053 COMPREHEN METABOLIC PANEL: CPT

## 2021-09-30 NOTE — TELEPHONE ENCOUNTER
Patient in lab needing lab orders for lab draw for upcoming physical on 10/05/2021.    Overdue labs pended for provider review.    Peter Virgen at 9:03 AM on 9/30/2021  [facility] Guide  Phone [phone]

## 2021-10-01 ENCOUNTER — MYC MEDICAL ADVICE (OUTPATIENT)
Dept: PEDIATRICS | Facility: CLINIC | Age: 71
End: 2021-10-01

## 2021-10-01 DIAGNOSIS — E78.1 PURE HYPERTRIGLYCERIDEMIA: Primary | ICD-10-CM

## 2021-10-01 LAB
ALBUMIN SERPL-MCNC: 4.2 G/DL (ref 3.4–5)
ALP SERPL-CCNC: 54 U/L (ref 40–150)
ALT SERPL W P-5'-P-CCNC: 24 U/L (ref 0–50)
ANION GAP SERPL CALCULATED.3IONS-SCNC: 5 MMOL/L (ref 3–14)
AST SERPL W P-5'-P-CCNC: 26 U/L (ref 0–45)
BILIRUB SERPL-MCNC: 0.4 MG/DL (ref 0.2–1.3)
BUN SERPL-MCNC: 24 MG/DL (ref 7–30)
CALCIUM SERPL-MCNC: 10 MG/DL (ref 8.5–10.1)
CHLORIDE BLD-SCNC: 101 MMOL/L (ref 94–109)
CHOLEST SERPL-MCNC: 236 MG/DL
CO2 SERPL-SCNC: 27 MMOL/L (ref 20–32)
CREAT SERPL-MCNC: 0.96 MG/DL (ref 0.52–1.04)
CREAT UR-MCNC: 126 MG/DL
FASTING STATUS PATIENT QL REPORTED: YES
GFR SERPL CREATININE-BSD FRML MDRD: 61 ML/MIN/1.73M2
GLUCOSE BLD-MCNC: 96 MG/DL (ref 70–99)
HDLC SERPL-MCNC: 57 MG/DL
LDLC SERPL CALC-MCNC: 116 MG/DL
LDLC SERPL CALC-MCNC: ABNORMAL MG/DL
MICROALBUMIN UR-MCNC: 6 MG/L
MICROALBUMIN/CREAT UR: 4.76 MG/G CR (ref 0–25)
NONHDLC SERPL-MCNC: 179 MG/DL
POTASSIUM BLD-SCNC: 4.5 MMOL/L (ref 3.4–5.3)
PROT SERPL-MCNC: 7.8 G/DL (ref 6.8–8.8)
SODIUM SERPL-SCNC: 133 MMOL/L (ref 133–144)
TRIGL SERPL-MCNC: 418 MG/DL

## 2021-10-01 RX ORDER — ICOSAPENT ETHYL 1 G/1
2 CAPSULE ORAL 2 TIMES DAILY WITH MEALS
Qty: 360 CAPSULE | Refills: 3 | Status: SHIPPED | OUTPATIENT
Start: 2021-10-01 | End: 2022-10-07

## 2021-10-02 ASSESSMENT — ENCOUNTER SYMPTOMS
HEADACHES: 1
DIZZINESS: 0
BREAST MASS: 0
SHORTNESS OF BREATH: 0
ARTHRALGIAS: 1
HEMATURIA: 0
CHILLS: 0
FEVER: 0
SORE THROAT: 0
COUGH: 0
DIARRHEA: 0
EYE PAIN: 1
HEARTBURN: 0
HEMATOCHEZIA: 0
PARESTHESIAS: 1
FREQUENCY: 0
DYSURIA: 0
CONSTIPATION: 0
JOINT SWELLING: 0
NERVOUS/ANXIOUS: 1
MYALGIAS: 1
NAUSEA: 0
PALPITATIONS: 0
WEAKNESS: 0
ABDOMINAL PAIN: 0

## 2021-10-02 ASSESSMENT — ACTIVITIES OF DAILY LIVING (ADL): CURRENT_FUNCTION: NO ASSISTANCE NEEDED

## 2021-10-05 ENCOUNTER — OFFICE VISIT (OUTPATIENT)
Dept: PEDIATRICS | Facility: CLINIC | Age: 71
End: 2021-10-05
Payer: MEDICARE

## 2021-10-05 VITALS
DIASTOLIC BLOOD PRESSURE: 56 MMHG | HEIGHT: 60 IN | SYSTOLIC BLOOD PRESSURE: 126 MMHG | WEIGHT: 135.4 LBS | RESPIRATION RATE: 16 BRPM | TEMPERATURE: 98.9 F | HEART RATE: 76 BPM | BODY MASS INDEX: 26.58 KG/M2

## 2021-10-05 DIAGNOSIS — F39 MOOD DISORDER (H): ICD-10-CM

## 2021-10-05 DIAGNOSIS — Z00.00 ENCOUNTER FOR MEDICARE ANNUAL WELLNESS EXAM: Primary | ICD-10-CM

## 2021-10-05 DIAGNOSIS — Z12.31 ENCOUNTER FOR SCREENING MAMMOGRAM FOR MALIGNANT NEOPLASM OF BREAST: ICD-10-CM

## 2021-10-05 PROCEDURE — G0439 PPPS, SUBSEQ VISIT: HCPCS | Performed by: INTERNAL MEDICINE

## 2021-10-05 PROCEDURE — 99214 OFFICE O/P EST MOD 30 MIN: CPT | Mod: 25 | Performed by: INTERNAL MEDICINE

## 2021-10-05 RX ORDER — FLUOXETINE 10 MG/1
10 CAPSULE ORAL DAILY
Qty: 60 CAPSULE | Refills: 1 | Status: SHIPPED | OUTPATIENT
Start: 2021-10-05 | End: 2022-03-24

## 2021-10-05 RX ORDER — HYDROXYZINE HYDROCHLORIDE 25 MG/1
25 TABLET, FILM COATED ORAL 3 TIMES DAILY PRN
Qty: 60 TABLET | Refills: 1 | Status: SHIPPED | OUTPATIENT
Start: 2021-10-05 | End: 2022-01-28

## 2021-10-05 ASSESSMENT — ENCOUNTER SYMPTOMS
DYSURIA: 0
MYALGIAS: 1
HEMATOCHEZIA: 0
SORE THROAT: 0
SHORTNESS OF BREATH: 0
BREAST MASS: 0
WEAKNESS: 0
NERVOUS/ANXIOUS: 1
FREQUENCY: 0
CHILLS: 0
ARTHRALGIAS: 1
ABDOMINAL PAIN: 0
PALPITATIONS: 0
DIZZINESS: 0
FEVER: 0
NAUSEA: 0
COUGH: 0
EYE PAIN: 1
PARESTHESIAS: 1
HEMATURIA: 0
JOINT SWELLING: 0
HEADACHES: 1
HEARTBURN: 0
DIARRHEA: 0
CONSTIPATION: 0

## 2021-10-05 ASSESSMENT — ACTIVITIES OF DAILY LIVING (ADL): CURRENT_FUNCTION: NO ASSISTANCE NEEDED

## 2021-10-05 ASSESSMENT — MIFFLIN-ST. JEOR: SCORE: 1064.64

## 2021-10-05 NOTE — PROGRESS NOTES
"SUBJECTIVE:   Aaliyah Hayes is a 71 year old female who presents for Preventive Visit.      Patient has been advised of split billing requirements and indicates understanding: Yes   Are you in the first 12 months of your Medicare coverage?  No    Healthy Habits:     In general, how would you rate your overall health?  Good    Frequency of exercise:  1 day/week    Duration of exercise:  45-60 minutes    Do you usually eat at least 4 servings of fruit and vegetables a day, include whole grains    & fiber and avoid regularly eating high fat or \"junk\" foods?  Yes    Taking medications regularly:  No    Medication side effects:  None    Ability to successfully perform activities of daily living:  No assistance needed    Home Safety:  No safety concerns identified    Hearing Impairment:  Difficulty following a conversation in a noisy restaurant or crowded room, feel that people are mumbling or not speaking clearly, difficulty following dialogue in the theater, need to ask people to speak up or repeat themselves, find that men's voices are easier to understand than woman's and difficulty understanding soft or whispered speech    In the past 6 months, have you been bothered by leaking of urine?  No    In general, how would you rate your overall mental or emotional health?  Good      PHQ-2 Total Score: 2    Additional concerns today:  Yes    Aaliyah is here for a preventive health visit.  Overall, she is doing well with the following concerns:      1. Rash on her face, for about the past few months. This summer, also had outbreak of hives on her back a couple of times. She was extremely stressed during that time, and things have overall improved since then both in her stress level and with the skin rash.     2. Had a terrible summer - put her elderly mother in a nursing home, fighting with siblings. Interested in low dose anxiety meds to get things back on track. Has had both some persistent anxiety and low mood " symptoms. No suicidal ideation, no panic attacks or overwhelming situations, but less patient and very stressed.       Do you feel safe in your environment? Yes    Have you ever done Advance Care Planning? (For example, a Health Directive, POLST, or a discussion with a medical provider or your loved ones about your wishes): Yes, patient states has an Advance Care Planning document and will bring a copy to the clinic.       Fall risk  Fallen 2 or more times in the past year?: No  Any fall with injury in the past year?: No    Cognitive Screening   1) Repeat 3 items (Leader, Season, Table)    2) Clock draw: NORMAL  3) 3 item recall: Recalls 3 objects  Results: 3 items recalled: COGNITIVE IMPAIRMENT LESS LIKELY    Mini-CogTM Copyright S [employer]. Licensed by the author for use in [facility]; reprinted with permission (buck@[facility]). All rights reserved.          Reviewed and updated as needed this visit by clinical staff  Tobacco  Allergies  Meds  Problems  Med Hx  Surg Hx  Fam Hx  Soc Hx          Reviewed and updated as needed this visit by Provider  Tobacco  Allergies  Meds  Problems  Med Hx  Surg Hx  Fam Hx         Social History     Tobacco Use     Smoking status: Never Smoker     Smokeless tobacco: Never Used   Substance Use Topics     Alcohol use: Yes     Alcohol/week: 0.0 standard drinks     Comment: 1 glass of wine per night         Alcohol Use 10/2/2021   Prescreen: >3 drinks/day or >7 drinks/week? Yes   Prescreen: >3 drinks/day or >7 drinks/week? -   AUDIT SCORE  4               Current providers sharing in care for this patient include:   Patient Care Team:  Jasmin Galvez MD as PCP - General (Pediatrics)  Jasmin Galvez MD as Assigned PCP    The following health maintenance items are reviewed in Epic and correct as of today:  Health Maintenance Due   Topic Date Due     ZOSTER IMMUNIZATION (2 of 3) 06/28/2013     MAMMO SCREENING  09/09/2020     INFLUENZA VACCINE (1) 09/01/2021      "FALL RISK ASSESSMENT  09/16/2021           Breast CA Risk Assessment (FHS-7) 10/2/2021   Do you have a family history of breast, colon, or ovarian cancer? No / Unknown         Mammogram Screening: Recommended mammography every 1-2 years with patient discussion and risk factor consideration  Pertinent mammograms are reviewed under the imaging tab.    Review of Systems   Constitutional: Negative for chills and fever.   HENT: Positive for congestion and hearing loss. Negative for ear pain and sore throat.    Eyes: Positive for pain. Negative for visual disturbance.   Respiratory: Negative for cough and shortness of breath.    Cardiovascular: Negative for chest pain, palpitations and peripheral edema.   Gastrointestinal: Negative for abdominal pain, constipation, diarrhea, heartburn, hematochezia and nausea.   Breasts:  Negative for tenderness, breast mass and discharge.   Genitourinary: Negative for dysuria, frequency, genital sores, hematuria, pelvic pain, urgency, vaginal bleeding and vaginal discharge.   Musculoskeletal: Positive for arthralgias and myalgias. Negative for joint swelling.   Skin: Positive for rash.   Neurological: Positive for headaches and paresthesias. Negative for dizziness and weakness.   Psychiatric/Behavioral: Negative for mood changes. The patient is nervous/anxious.      Constitutional, HEENT, cardiovascular, pulmonary, gi and gu systems are negative, except as otherwise noted.    OBJECTIVE:   /56 (BP Location: Right arm, Patient Position: Sitting)   Pulse 76   Temp 98.9  F (37.2  C) (Tympanic)   Resp 16   Ht 1.53 m (5' 0.25\")   Wt 61.4 kg (135 lb 6.4 oz)   BMI 26.22 kg/m   Estimated body mass index is 26.22 kg/m  as calculated from the following:    Height as of this encounter: 1.53 m (5' 0.25\").    Weight as of this encounter: 61.4 kg (135 lb 6.4 oz).  Physical Exam  GENERAL APPEARANCE: healthy, alert and no distress  EYES: Eyes grossly normal to inspection, PERRL and " conjunctivae and sclerae normal  HENT: ear canals and TM's normal, nose and mouth without ulcers or lesions, oropharynx clear and oral mucous membranes moist  NECK: no adenopathy, no asymmetry, masses, or scars and thyroid normal to palpation  RESP: lungs clear to auscultation - no rales, rhonchi or wheezes  CV: regular rate and rhythm, normal S1 S2, no S3 or S4, no murmur, click or rub, no peripheral edema and peripheral pulses strong  ABDOMEN: soft, nontender, no hepatosplenomegaly, no masses and bowel sounds normal  MS: no musculoskeletal defects are noted and gait is age appropriate without ataxia  SKIN: no suspicious lesions or rashes  NEURO: Normal strength and tone, sensory exam grossly normal, mentation intact and speech normal  PSYCH: mentation appears normal and affect normal/bright    Diagnostic Test Results:  Labs reviewed in Epic    ASSESSMENT / PLAN:   (Z00.00) Encounter for Medicare annual wellness exam  (primary encounter diagnosis)  Comment: Up to date on screening, doing well.   Plan: MA SCREENING DIGITAL BILAT - Future  (s+30)            (Z12.31) Encounter for screening mammogram for malignant neoplasm of breast   Comment: routine screening  Plan: MA SCREENING DIGITAL BILAT - Future  (s+30)            (F39) Mood disorder (H)  Comment: Situational due to recent family and social issues. No significant history of depression. Declined therapy at this time, will start fluoxetine 20 mg daily and follow up in 4 weeks.  Hydroxyzine for sleep/anxiety.  Plan: hydrOXYzine (ATARAX) 25 MG tablet, FLUoxetine         (PROZAC) 10 MG capsule              Patient has been advised of split billing requirements and indicates understanding: Yes  COUNSELING:  Reviewed preventive health counseling, as reflected in patient instructions       Regular exercise       Healthy diet/nutrition       Osteoporosis prevention/bone health    Estimated body mass index is 26.22 kg/m  as calculated from the following:    Height as of  "this encounter: 1.53 m (5' 0.25\").    Weight as of this encounter: 61.4 kg (135 lb 6.4 oz).        She reports that she has never smoked. She has never used smokeless tobacco.      Appropriate preventive services were discussed with this patient, including applicable screening as appropriate for cardiovascular disease, diabetes, osteopenia/osteoporosis, and glaucoma.  As appropriate for age/gender, discussed screening for colorectal cancer, prostate cancer, breast cancer, and cervical cancer. Checklist reviewing preventive services available has been given to the patient.    Reviewed patients plan of care and provided an AVS. The Basic Care Plan (routine screening as documented in Health Maintenance) for Aaliyah meets the Care Plan requirement. This Care Plan has been established and reviewed with the Patient.    Counseling Resources:  ATP IV Guidelines  Pooled Cohorts Equation Calculator  Breast Cancer Risk Calculator  Breast Cancer: Medication to Reduce Risk  FRAX Risk Assessment  ICSI Preventive Guidelines  Dietary Guidelines for Americans, 2010  [employer]'s MyPlate  ASA Prophylaxis  Lung CA Screening    Jasmin Galvez MD  [facility]    Identified Health Risks:  "

## 2021-10-05 NOTE — PATIENT INSTRUCTIONS
Fluoxetine: start with 10 mg daily, for two weeks.  Then can increase to 20 mg daily.  We will follow up in 1 month.     Hydroxyzine: as needed for insomnia, anxiety, itch.     I recommend you come to to the pharmacy for your flu shot and shingles vaccine.       Patient Education   Personalized Prevention Plan  You are due for the preventive services outlined below.  Your care team is available to assist you in scheduling these services.  If you have already completed any of these items, please share that information with your care team to update in your medical record.  Health Maintenance Due   Topic Date Due     ANNUAL REVIEW OF HM ORDERS  Never done     Zoster (Shingles) Vaccine (2 of 3) 06/28/2013     Mammogram  09/09/2020     Flu Vaccine (1) 09/01/2021     FALL RISK ASSESSMENT  09/16/2021     Annual Wellness Visit  09/16/2021

## 2021-10-13 ENCOUNTER — MYC MEDICAL ADVICE (OUTPATIENT)
Dept: PEDIATRICS | Facility: CLINIC | Age: 71
End: 2021-10-13

## 2021-10-13 DIAGNOSIS — E78.1 PURE HYPERTRIGLYCERIDEMIA: Primary | ICD-10-CM

## 2021-10-16 RX ORDER — ATORVASTATIN CALCIUM 20 MG/1
20 TABLET, FILM COATED ORAL DAILY
Qty: 90 TABLET | Refills: 1 | Status: SHIPPED | OUTPATIENT
Start: 2021-10-16 | End: 2022-10-07

## 2021-10-27 ENCOUNTER — ANCILLARY PROCEDURE (OUTPATIENT)
Dept: MAMMOGRAPHY | Facility: CLINIC | Age: 71
End: 2021-10-27
Attending: INTERNAL MEDICINE
Payer: MEDICARE

## 2021-10-27 DIAGNOSIS — Z00.00 ENCOUNTER FOR MEDICARE ANNUAL WELLNESS EXAM: ICD-10-CM

## 2021-10-27 DIAGNOSIS — Z12.31 ENCOUNTER FOR SCREENING MAMMOGRAM FOR MALIGNANT NEOPLASM OF BREAST: ICD-10-CM

## 2021-10-27 PROCEDURE — 77067 SCR MAMMO BI INCL CAD: CPT | Mod: TC | Performed by: RADIOLOGY

## 2021-10-27 PROCEDURE — 77063 BREAST TOMOSYNTHESIS BI: CPT | Mod: TC | Performed by: RADIOLOGY

## 2021-11-08 ENCOUNTER — MYC MEDICAL ADVICE (OUTPATIENT)
Dept: PEDIATRICS | Facility: CLINIC | Age: 71
End: 2021-11-08
Payer: MEDICARE

## 2021-11-08 DIAGNOSIS — I10 BENIGN ESSENTIAL HYPERTENSION: ICD-10-CM

## 2021-11-09 RX ORDER — CHLORTHALIDONE 25 MG/1
25 TABLET ORAL DAILY
Qty: 90 TABLET | Refills: 2 | Status: SHIPPED | OUTPATIENT
Start: 2021-11-09 | End: 2022-09-15

## 2021-11-09 RX ORDER — LISINOPRIL 10 MG/1
10 TABLET ORAL DAILY
Qty: 90 TABLET | Refills: 2 | Status: SHIPPED | OUTPATIENT
Start: 2021-11-09 | End: 2022-08-18

## 2021-11-09 NOTE — TELEPHONE ENCOUNTER
Prescription approved per [facility] Refill Protocol.    Bryanna Hansen RN on 11/9/2021 at 11:36 AM

## 2021-12-03 ENCOUNTER — ANCILLARY PROCEDURE (OUTPATIENT)
Dept: GENERAL RADIOLOGY | Facility: CLINIC | Age: 71
End: 2021-12-03
Attending: PHYSICIAN ASSISTANT
Payer: COMMERCIAL

## 2021-12-03 ENCOUNTER — OFFICE VISIT (OUTPATIENT)
Dept: URGENT CARE | Facility: URGENT CARE | Age: 71
End: 2021-12-03
Payer: MEDICARE

## 2021-12-03 VITALS
SYSTOLIC BLOOD PRESSURE: 154 MMHG | TEMPERATURE: 99 F | DIASTOLIC BLOOD PRESSURE: 80 MMHG | HEART RATE: 97 BPM | OXYGEN SATURATION: 100 %

## 2021-12-03 DIAGNOSIS — R06.02 SOB (SHORTNESS OF BREATH): ICD-10-CM

## 2021-12-03 DIAGNOSIS — R06.02 SOB (SHORTNESS OF BREATH): Primary | ICD-10-CM

## 2021-12-03 LAB
BASOPHILS # BLD AUTO: 0 10E3/UL (ref 0–0.2)
BASOPHILS NFR BLD AUTO: 0 %
EOSINOPHIL # BLD AUTO: 0.2 10E3/UL (ref 0–0.7)
EOSINOPHIL NFR BLD AUTO: 3 %
ERYTHROCYTE [DISTWIDTH] IN BLOOD BY AUTOMATED COUNT: 12 % (ref 10–15)
HCT VFR BLD AUTO: 36 % (ref 35–47)
HGB BLD-MCNC: 12.1 G/DL (ref 11.7–15.7)
LYMPHOCYTES # BLD AUTO: 2.3 10E3/UL (ref 0.8–5.3)
LYMPHOCYTES NFR BLD AUTO: 25 %
MCH RBC QN AUTO: 33.7 PG (ref 26.5–33)
MCHC RBC AUTO-ENTMCNC: 33.6 G/DL (ref 31.5–36.5)
MCV RBC AUTO: 100 FL (ref 78–100)
MONOCYTES # BLD AUTO: 1.1 10E3/UL (ref 0–1.3)
MONOCYTES NFR BLD AUTO: 11 %
NEUTROPHILS # BLD AUTO: 5.6 10E3/UL (ref 1.6–8.3)
NEUTROPHILS NFR BLD AUTO: 61 %
PLATELET # BLD AUTO: 273 10E3/UL (ref 150–450)
RBC # BLD AUTO: 3.59 10E6/UL (ref 3.8–5.2)
WBC # BLD AUTO: 9.2 10E3/UL (ref 4–11)

## 2021-12-03 PROCEDURE — 71046 X-RAY EXAM CHEST 2 VIEWS: CPT | Performed by: RADIOLOGY

## 2021-12-03 PROCEDURE — 93000 ELECTROCARDIOGRAM COMPLETE: CPT | Performed by: PHYSICIAN ASSISTANT

## 2021-12-03 PROCEDURE — 85025 COMPLETE CBC W/AUTO DIFF WBC: CPT | Performed by: PHYSICIAN ASSISTANT

## 2021-12-03 PROCEDURE — 99214 OFFICE O/P EST MOD 30 MIN: CPT | Performed by: PHYSICIAN ASSISTANT

## 2021-12-03 PROCEDURE — 36415 COLL VENOUS BLD VENIPUNCTURE: CPT | Performed by: PHYSICIAN ASSISTANT

## 2021-12-04 NOTE — PROGRESS NOTES
Assessment & Plan     1. SOB (shortness of breath)  Patient presents the clinic for evaluation of shortness of breath. On exam, she looks well. Vital signs are stable. Oxygen is normal. She is not dyspneic. Considered ACS/MI, pneumothorax, pneumonia, PE, etc. EKG shows low voltage and NSR. I would like her to follow-up with her PCP on this. Chest x-ray is without abnormality. CBC is normal. I suspect that her symptoms are related to stress. Stress reduction techniques. If they were to worsen, I advised follow-up in ER.  - EKG 12-lead complete w/read - Clinics  - XR Chest 2 Views; Future  - CBC with platelets and differential; Future  - CBC with platelets and differential        Return in about 1 week (around 12/10/2021), or PCP.    Diagnosis and treatment plan was reviewed with patient and/or family.   We went over any labs or imaging. Discussed worsening symptoms or little to no relief despite treatment plan to follow-up with PCP or return to clinic.  Patient verbalizes understanding. All questions were addressed and answered.     Jasmin Mitchell PA-C  [facility]    CHIEF COMPLAINT:   Chief Complaint   Patient presents with     Urgent Care     Palpitations     feels sob,irregular heartbeat  for months- feeling very stressed-  going on a plabe to visit with her mom- anxious about it     Subjective     Aaliyah is a 71 year old female who presents to clinic today for evaluation of palpitations and shortness of breath. Reports that since receiving the COVID-19 vaccine on 2/2021, she has felt intermittent irregular heartbeats. For the past 2 weeks, she has had intermittent shortness of breath. Shortness of breath can be associated with some dizziness and lightheadedness.Currently not feeling the symptoms Denies having any URI symptoms. Endorses anxiety and stress. She is currently taking atenolol for her palpitations. Denies fever, chills or hemoptysis. NO recent travel or surgery.       Past  Medical History:   Diagnosis Date     Arthritis      Atypical squamous cells cannot exclude high grade squamous intraepithelial lesion on cytologic smear of cervix (ASC-H) 06/09/2017 06/09/17 ASC-H, Neg HPV, Endometrial cells present. Beaver= SELENE 1, EMB= Benign     Facial cellulitis 11/22/2015     HTN (hypertension)      Perforated sigmoid colon (H) 1/6/2017     Past Surgical History:   Procedure Laterality Date     APPENDECTOMY  2012     COLECTOMY LEFT N/A 1/6/2017    Procedure: COLECTOMY LEFT;  Surgeon: David Gaitan MD;  Location: [facility] OR     COLONOSCOPY N/A 1/6/2017    Procedure: COLONOSCOPY;  Surgeon: Regis Mckoy MD;  Location:  GI     LAPAROSCOPIC ASSISTED SIGMOID COLECTOMY N/A 1/6/2017    Procedure: LAPAROSCOPIC ASSISTED SIGMOID COLECTOMY;  Surgeon: David Gaitan MD;  Location:  OR     ORTHOPEDIC SURGERY       REVERSE ARTHROPLASTY SHOULDER Left 11/20/2018    Procedure: Left reverse total shoulder arthroplasty;  Surgeon: Cricket Shah MD;  Location:  OR     SURGICAL HISTORY OF -   09/2010    Rotator cuff repair-right shoulder     SURGICAL HISTORY OF -   12/2011    Right hip replacement     SURGICAL HISTORY OF -   2005    ACL and MCL left knee     Social History     Tobacco Use     Smoking status: Never Smoker     Smokeless tobacco: Never Used   Substance Use Topics     Alcohol use: Yes     Alcohol/week: 0.0 standard drinks     Comment: 1 glass of wine per night     Current Outpatient Medications   Medication     Ascorbic Acid (VITAMIN C PO)     atenolol (TENORMIN) 25 MG tablet     atorvastatin (LIPITOR) 20 MG tablet     calcium carbonate (OSCAL 500) 1250 (500 CA) MG TABS tablet     chlorthalidone (HYGROTON) 25 MG tablet     FLUoxetine (PROZAC) 10 MG capsule     hydrOXYzine (ATARAX) 25 MG tablet     lisinopril (ZESTRIL) 10 MG tablet     multivitamin, therapeutic with minerals (MULTI-VITAMIN) TABS     Omega-3 Fatty Acids (OMEGA 3 500) 500 MG CAPS     potassium gluconate 2.5 MEQ TABS      valACYclovir (VALTREX) 1000 mg tablet     vitamin  B complex with vitamin C (VITAMIN  B COMPLEX) TABS     Icosapent Ethyl 1 g CAPS     No current facility-administered medications for this visit.     Allergies   Allergen Reactions     Blood Transfusion Related (Informational Only) Other (See Comments)     Patient has a history of a clinically significant antibody against RBC antigens.  A delay in compatible RBCs may occur.     Ciprofloxacin      Facial swelling, tingling on her upper lip, throat slightly closed, racing heart     Oxycodone      Nightmares, heart palpations     Sulfa Drugs      Eye reaction to sulfa-based eye drop       10 point ROS of systems were all negative except for pertinent positives noted in my HPI.      Exam:   BP (!) 154/80   Pulse 97   Temp 99  F (37.2  C) (Tympanic)   SpO2 100%   Constitutional: healthy, alert and no distress  Head: Normocephalic, atraumatic.  Eyes: conjunctiva clear, no drainage  ENT: TMs clear and shiny ton, nasal mucosa pink and moist, throat without tonsillar hypertrophy or erythema  Neck: neck is supple, no cervical lymphadenopathy or nuchal rigidity  Cardiovascular: RRR  Respiratory: CTA bilaterally, no rhonchi or rales  Gastrointestinal: soft and nontender  Skin: no rashes  Neurologic: Speech clear, gait normal. Moves all extremities.    Results for orders placed or performed in visit on 12/03/21   XR Chest 2 Views     Status: None    Narrative    CHEST TWO VIEWS 12/3/2021 4:07 PM     HISTORY: SOB (shortness of breath)    COMPARISON: 10/11/2018    FINDINGS: Heart size and pulmonary vascularity are within normal  limits. The lungs are clear. No pneumothorax or pleural effusion. Left  shoulder replacement noted.      Impression    IMPRESSION: No radiographic evidence of acute chest abnormality.     DAISHA FRASER MD         SYSTEM ID:  [id number]   Results for orders placed or performed in visit on 12/03/21   CBC with platelets and differential     Status:  Abnormal   Result Value Ref Range    WBC Count 9.2 4.0 - 11.0 10e3/uL    RBC Count 3.59 (L) 3.80 - 5.20 10e6/uL    Hemoglobin 12.1 11.7 - 15.7 g/dL    Hematocrit 36.0 35.0 - 47.0 %     78 - 100 fL    MCH 33.7 (H) 26.5 - 33.0 pg    MCHC 33.6 31.5 - 36.5 g/dL    RDW 12.0 10.0 - 15.0 %    Platelet Count 273 150 - 450 10e3/uL    % Neutrophils 61 %    % Lymphocytes 25 %    % Monocytes 11 %    % Eosinophils 3 %    % Basophils 0 %    Absolute Neutrophils 5.6 1.6 - 8.3 10e3/uL    Absolute Lymphocytes 2.3 0.8 - 5.3 10e3/uL    Absolute Monocytes 1.1 0.0 - 1.3 10e3/uL    Absolute Eosinophils 0.2 0.0 - 0.7 10e3/uL    Absolute Basophils 0.0 0.0 - 0.2 10e3/uL   CBC with platelets and differential     Status: Abnormal    Narrative    The following orders were created for panel order CBC with platelets and differential.  Procedure                               Abnormality         Status                     ---------                               -----------         ------                     CBC with platelets and d...[[id number]]  Abnormal            Final result                 Please view results for these tests on the individual orders.

## 2022-01-27 DIAGNOSIS — F39 MOOD DISORDER (H): ICD-10-CM

## 2022-01-28 RX ORDER — HYDROXYZINE HYDROCHLORIDE 25 MG/1
25 TABLET, FILM COATED ORAL 3 TIMES DAILY PRN
Qty: 60 TABLET | Refills: 1 | Status: SHIPPED | OUTPATIENT
Start: 2022-01-28 | End: 2022-03-22

## 2022-01-28 NOTE — TELEPHONE ENCOUNTER
Prescription approved per [facility] Refill Protocol.    Soledad Coffey RN

## 2022-03-19 DIAGNOSIS — F39 MOOD DISORDER (H): ICD-10-CM

## 2022-03-21 DIAGNOSIS — F39 MOOD DISORDER (H): ICD-10-CM

## 2022-03-22 RX ORDER — HYDROXYZINE HYDROCHLORIDE 25 MG/1
25 TABLET, FILM COATED ORAL 3 TIMES DAILY PRN
Qty: 60 TABLET | Refills: 0 | Status: SHIPPED | OUTPATIENT
Start: 2022-03-22 | End: 2022-07-07

## 2022-03-22 NOTE — TELEPHONE ENCOUNTER
Routing refill request to provider for review/approval because:  Patient needs to be seen because:  Was due for fluoxetine f/u in November 2021.  This is for atarax.    Please call the pt and see if she is still using fluoxetine - if so she is due for a f/u appt.  Thanks!

## 2022-03-24 RX ORDER — FLUOXETINE 10 MG/1
10 CAPSULE ORAL DAILY
Qty: 90 CAPSULE | Refills: 1 | Status: SHIPPED | OUTPATIENT
Start: 2022-03-24 | End: 2022-10-07

## 2022-03-24 NOTE — TELEPHONE ENCOUNTER
Prescription approved per [facility] Refill Protocol.  John Westbrook RN on 3/24/2022 at 10:10 AM

## 2022-03-28 NOTE — TELEPHONE ENCOUNTER
2nd attempt: lvm requesting pt to call back. If patient still takes Fluoxetine, pt is due for a follow-up.    Cici Motley on 3/28/2022 at 2:28 PM

## 2022-05-05 ENCOUNTER — TELEPHONE (OUTPATIENT)
Dept: PEDIATRICS | Facility: CLINIC | Age: 72
End: 2022-05-05
Payer: MEDICARE

## 2022-05-10 NOTE — TELEPHONE ENCOUNTER
Central Prior Authorization Team   Phone: [phone]    PA Initiation    Medication: Hydroxyzine 25mg  Insurance Company: [employer] - Phone [phone] Fax [phone]  Pharmacy Filling the Rx: [facility] [address]  Filling Pharmacy Phone: [phone]  Filling Pharmacy Fax:    Start Date: 5/10/2022

## 2022-07-05 DIAGNOSIS — F39 MOOD DISORDER (H): ICD-10-CM

## 2022-07-07 RX ORDER — HYDROXYZINE HYDROCHLORIDE 25 MG/1
25 TABLET, FILM COATED ORAL 3 TIMES DAILY PRN
Qty: 60 TABLET | Refills: 1 | Status: SHIPPED | OUTPATIENT
Start: 2022-07-07 | End: 2022-11-11

## 2022-07-11 DIAGNOSIS — F39 MOOD DISORDER (H): ICD-10-CM

## 2022-07-13 RX ORDER — HYDROXYZINE HYDROCHLORIDE 25 MG/1
25 TABLET, FILM COATED ORAL 3 TIMES DAILY PRN
Qty: 60 TABLET | Refills: 1 | OUTPATIENT
Start: 2022-07-13

## 2022-08-08 DIAGNOSIS — I10 BENIGN ESSENTIAL HYPERTENSION: Primary | ICD-10-CM

## 2022-08-08 DIAGNOSIS — E78.5 HYPERLIPIDEMIA WITH TARGET LDL LESS THAN 130: ICD-10-CM

## 2022-08-15 DIAGNOSIS — I10 BENIGN ESSENTIAL HYPERTENSION: ICD-10-CM

## 2022-08-18 RX ORDER — LISINOPRIL 10 MG/1
10 TABLET ORAL DAILY
Qty: 90 TABLET | Refills: 0 | Status: SHIPPED | OUTPATIENT
Start: 2022-08-18 | End: 2022-10-07

## 2022-08-18 NOTE — TELEPHONE ENCOUNTER
Last refill until visit  Prescription approved per [facility] Refill Protocol.  Jazzy Prasad RN, BSN  [facility]

## 2022-09-14 DIAGNOSIS — I10 BENIGN ESSENTIAL HYPERTENSION: ICD-10-CM

## 2022-09-15 RX ORDER — CHLORTHALIDONE 25 MG/1
25 TABLET ORAL DAILY
Qty: 90 TABLET | Refills: 0 | Status: SHIPPED | OUTPATIENT
Start: 2022-09-15 | End: 2022-10-07

## 2022-09-15 NOTE — TELEPHONE ENCOUNTER
Routing refill request to provider for review/approval because:  Failing bp    Soledad Coffey, RN

## 2022-09-30 SDOH — ECONOMIC STABILITY: INCOME INSECURITY: IN THE LAST 12 MONTHS, WAS THERE A TIME WHEN YOU WERE NOT ABLE TO PAY THE MORTGAGE OR RENT ON TIME?: NO

## 2022-09-30 SDOH — ECONOMIC STABILITY: INCOME INSECURITY: HOW HARD IS IT FOR YOU TO PAY FOR THE VERY BASICS LIKE FOOD, HOUSING, MEDICAL CARE, AND HEATING?: SOMEWHAT HARD

## 2022-09-30 SDOH — ECONOMIC STABILITY: FOOD INSECURITY: WITHIN THE PAST 12 MONTHS, YOU WORRIED THAT YOUR FOOD WOULD RUN OUT BEFORE YOU GOT MONEY TO BUY MORE.: NEVER TRUE

## 2022-09-30 SDOH — ECONOMIC STABILITY: FOOD INSECURITY: WITHIN THE PAST 12 MONTHS, THE FOOD YOU BOUGHT JUST DIDN'T LAST AND YOU DIDN'T HAVE MONEY TO GET MORE.: SOMETIMES TRUE

## 2022-09-30 SDOH — HEALTH STABILITY: PHYSICAL HEALTH: ON AVERAGE, HOW MANY DAYS PER WEEK DO YOU ENGAGE IN MODERATE TO STRENUOUS EXERCISE (LIKE A BRISK WALK)?: 2 DAYS

## 2022-09-30 SDOH — ECONOMIC STABILITY: TRANSPORTATION INSECURITY
IN THE PAST 12 MONTHS, HAS LACK OF TRANSPORTATION KEPT YOU FROM MEETINGS, WORK, OR FROM GETTING THINGS NEEDED FOR DAILY LIVING?: NO

## 2022-09-30 SDOH — ECONOMIC STABILITY: TRANSPORTATION INSECURITY
IN THE PAST 12 MONTHS, HAS THE LACK OF TRANSPORTATION KEPT YOU FROM MEDICAL APPOINTMENTS OR FROM GETTING MEDICATIONS?: NO

## 2022-09-30 SDOH — HEALTH STABILITY: PHYSICAL HEALTH: ON AVERAGE, HOW MANY MINUTES DO YOU ENGAGE IN EXERCISE AT THIS LEVEL?: 40 MIN

## 2022-09-30 ASSESSMENT — ENCOUNTER SYMPTOMS
MYALGIAS: 1
PALPITATIONS: 0
HEMATOCHEZIA: 0
SHORTNESS OF BREATH: 0
NAUSEA: 0
CONSTIPATION: 0
DIARRHEA: 0
FEVER: 0
COUGH: 0
NERVOUS/ANXIOUS: 0
SORE THROAT: 0
DIZZINESS: 1
HEARTBURN: 0
PARESTHESIAS: 1
JOINT SWELLING: 0
HEADACHES: 1
HEMATURIA: 0
WEAKNESS: 0
ARTHRALGIAS: 1
BREAST MASS: 0
DYSURIA: 0
EYE PAIN: 0
FREQUENCY: 0
CHILLS: 0
ABDOMINAL PAIN: 0

## 2022-09-30 ASSESSMENT — SOCIAL DETERMINANTS OF HEALTH (SDOH)
HOW OFTEN DO YOU GET TOGETHER WITH FRIENDS OR RELATIVES?: PATIENT DECLINED
DO YOU BELONG TO ANY CLUBS OR ORGANIZATIONS SUCH AS CHURCH GROUPS UNIONS, FRATERNAL OR ATHLETIC GROUPS, OR SCHOOL GROUPS?: YES
HOW OFTEN DO YOU ATTEND CHURCH OR RELIGIOUS SERVICES?: MORE THAN 4 TIMES PER YEAR
IN A TYPICAL WEEK, HOW MANY TIMES DO YOU TALK ON THE PHONE WITH FAMILY, FRIENDS, OR NEIGHBORS?: THREE TIMES A WEEK

## 2022-09-30 ASSESSMENT — LIFESTYLE VARIABLES
HOW OFTEN DO YOU HAVE SIX OR MORE DRINKS ON ONE OCCASION: NEVER
SKIP TO QUESTIONS 9-10: 1
HOW OFTEN DO YOU HAVE A DRINK CONTAINING ALCOHOL: 4 OR MORE TIMES A WEEK
AUDIT-C TOTAL SCORE: 4
HOW MANY STANDARD DRINKS CONTAINING ALCOHOL DO YOU HAVE ON A TYPICAL DAY: 1 OR 2

## 2022-09-30 ASSESSMENT — ACTIVITIES OF DAILY LIVING (ADL): CURRENT_FUNCTION: NO ASSISTANCE NEEDED

## 2022-10-03 ENCOUNTER — LAB (OUTPATIENT)
Dept: LAB | Facility: CLINIC | Age: 72
End: 2022-10-03
Payer: MEDICARE

## 2022-10-03 DIAGNOSIS — I10 BENIGN ESSENTIAL HYPERTENSION: ICD-10-CM

## 2022-10-03 DIAGNOSIS — E78.5 HYPERLIPIDEMIA WITH TARGET LDL LESS THAN 130: ICD-10-CM

## 2022-10-03 DIAGNOSIS — R79.9 ABNORMAL FINDING OF BLOOD CHEMISTRY, UNSPECIFIED: ICD-10-CM

## 2022-10-03 DIAGNOSIS — Z13.1 SCREENING FOR DIABETES MELLITUS: ICD-10-CM

## 2022-10-03 DIAGNOSIS — I10 BENIGN ESSENTIAL HYPERTENSION: Primary | ICD-10-CM

## 2022-10-03 LAB
ALBUMIN SERPL-MCNC: 3.9 G/DL (ref 3.4–5)
ALP SERPL-CCNC: 59 U/L (ref 40–150)
ALT SERPL W P-5'-P-CCNC: 24 U/L (ref 0–50)
ANION GAP SERPL CALCULATED.3IONS-SCNC: 4 MMOL/L (ref 3–14)
AST SERPL W P-5'-P-CCNC: 25 U/L (ref 0–45)
BILIRUB SERPL-MCNC: 0.4 MG/DL (ref 0.2–1.3)
BUN SERPL-MCNC: 26 MG/DL (ref 7–30)
CALCIUM SERPL-MCNC: 10.1 MG/DL (ref 8.5–10.1)
CHLORIDE BLD-SCNC: 106 MMOL/L (ref 94–109)
CHOLEST SERPL-MCNC: 256 MG/DL
CO2 SERPL-SCNC: 28 MMOL/L (ref 20–32)
CREAT SERPL-MCNC: 1.22 MG/DL (ref 0.52–1.04)
CREAT UR-MCNC: 97 MG/DL
FASTING STATUS PATIENT QL REPORTED: YES
GFR SERPL CREATININE-BSD FRML MDRD: 48 ML/MIN/1.73M2
GLUCOSE BLD-MCNC: 108 MG/DL (ref 70–99)
HBA1C MFR BLD: 4.9 % (ref 0–5.6)
HDLC SERPL-MCNC: 59 MG/DL
LDLC SERPL CALC-MCNC: 148 MG/DL
MICROALBUMIN UR-MCNC: 35 MG/L
MICROALBUMIN/CREAT UR: 36.08 MG/G CR (ref 0–25)
NONHDLC SERPL-MCNC: 197 MG/DL
POTASSIUM BLD-SCNC: 4.7 MMOL/L (ref 3.4–5.3)
PROT SERPL-MCNC: 7.5 G/DL (ref 6.8–8.8)
SODIUM SERPL-SCNC: 138 MMOL/L (ref 133–144)
TRIGL SERPL-MCNC: 243 MG/DL

## 2022-10-03 PROCEDURE — 80053 COMPREHEN METABOLIC PANEL: CPT

## 2022-10-03 PROCEDURE — 83036 HEMOGLOBIN GLYCOSYLATED A1C: CPT

## 2022-10-03 PROCEDURE — 82043 UR ALBUMIN QUANTITATIVE: CPT

## 2022-10-03 PROCEDURE — 36415 COLL VENOUS BLD VENIPUNCTURE: CPT

## 2022-10-03 PROCEDURE — 80061 LIPID PANEL: CPT

## 2022-10-05 DIAGNOSIS — I10 BENIGN ESSENTIAL HYPERTENSION: ICD-10-CM

## 2022-10-05 RX ORDER — ATENOLOL 25 MG/1
12.5 TABLET ORAL DAILY
Qty: 45 TABLET | Refills: 3 | Status: SHIPPED | OUTPATIENT
Start: 2022-10-05 | End: 2023-08-14

## 2022-10-05 NOTE — TELEPHONE ENCOUNTER
Routing refill request to provider for review/approval because:  Labs out of range:  Blood pressure    Zara DURAN RN, BSN, PHN  [facility]

## 2022-10-07 ENCOUNTER — OFFICE VISIT (OUTPATIENT)
Dept: PEDIATRICS | Facility: CLINIC | Age: 72
End: 2022-10-07
Payer: MEDICARE

## 2022-10-07 VITALS
BODY MASS INDEX: 25.11 KG/M2 | OXYGEN SATURATION: 98 % | TEMPERATURE: 98.4 F | WEIGHT: 133 LBS | SYSTOLIC BLOOD PRESSURE: 128 MMHG | HEART RATE: 73 BPM | HEIGHT: 61 IN | RESPIRATION RATE: 16 BRPM | DIASTOLIC BLOOD PRESSURE: 64 MMHG

## 2022-10-07 DIAGNOSIS — Z00.00 ENCOUNTER FOR MEDICARE ANNUAL WELLNESS EXAM: Primary | ICD-10-CM

## 2022-10-07 DIAGNOSIS — F39 MOOD DISORDER (H): ICD-10-CM

## 2022-10-07 DIAGNOSIS — Z12.11 SPECIAL SCREENING FOR MALIGNANT NEOPLASM OF COLON: ICD-10-CM

## 2022-10-07 DIAGNOSIS — N18.31 CHRONIC KIDNEY DISEASE, STAGE 3A (H): ICD-10-CM

## 2022-10-07 DIAGNOSIS — Z86.19 H/O HERPETIC WHITLOW: ICD-10-CM

## 2022-10-07 DIAGNOSIS — I10 BENIGN ESSENTIAL HYPERTENSION: ICD-10-CM

## 2022-10-07 DIAGNOSIS — Z23 ENCOUNTER FOR ADMINISTRATION OF VACCINE: ICD-10-CM

## 2022-10-07 DIAGNOSIS — H91.93 BILATERAL HEARING LOSS, UNSPECIFIED HEARING LOSS TYPE: ICD-10-CM

## 2022-10-07 PROCEDURE — 99214 OFFICE O/P EST MOD 30 MIN: CPT | Mod: 25 | Performed by: NURSE PRACTITIONER

## 2022-10-07 PROCEDURE — 90662 IIV NO PRSV INCREASED AG IM: CPT | Performed by: NURSE PRACTITIONER

## 2022-10-07 PROCEDURE — G0008 ADMIN INFLUENZA VIRUS VAC: HCPCS | Performed by: NURSE PRACTITIONER

## 2022-10-07 PROCEDURE — G0439 PPPS, SUBSEQ VISIT: HCPCS | Performed by: NURSE PRACTITIONER

## 2022-10-07 RX ORDER — LISINOPRIL 20 MG/1
20 TABLET ORAL DAILY
Qty: 90 TABLET | Refills: 3 | Status: SHIPPED | OUTPATIENT
Start: 2022-10-07 | End: 2023-11-16

## 2022-10-07 RX ORDER — VALACYCLOVIR HYDROCHLORIDE 1 G/1
500 TABLET, FILM COATED ORAL DAILY
Qty: 90 TABLET | Refills: 0 | Status: SHIPPED | OUTPATIENT
Start: 2022-10-07 | End: 2022-10-07

## 2022-10-07 RX ORDER — CHLORTHALIDONE 25 MG/1
25 TABLET ORAL DAILY
Qty: 90 TABLET | Refills: 3 | Status: SHIPPED | OUTPATIENT
Start: 2022-10-07 | End: 2023-02-20

## 2022-10-07 RX ORDER — VALACYCLOVIR HYDROCHLORIDE 1 G/1
1000 TABLET, FILM COATED ORAL DAILY
Qty: 90 TABLET | Refills: 0 | Status: SHIPPED | OUTPATIENT
Start: 2022-10-07 | End: 2023-11-16

## 2022-10-07 ASSESSMENT — ENCOUNTER SYMPTOMS
JOINT SWELLING: 0
HEADACHES: 1
PALPITATIONS: 0
CHILLS: 0
DIZZINESS: 1
COUGH: 0
ABDOMINAL PAIN: 0
NERVOUS/ANXIOUS: 0
FEVER: 0
CONSTIPATION: 0
EYE PAIN: 0
HEMATURIA: 0
PARESTHESIAS: 1
MYALGIAS: 1
SORE THROAT: 0
DIARRHEA: 0
SHORTNESS OF BREATH: 0
FREQUENCY: 0
HEARTBURN: 0
ARTHRALGIAS: 1
DYSURIA: 0
NAUSEA: 0
BREAST MASS: 0
HEMATOCHEZIA: 0
WEAKNESS: 0

## 2022-10-07 ASSESSMENT — ACTIVITIES OF DAILY LIVING (ADL): CURRENT_FUNCTION: NO ASSISTANCE NEEDED

## 2022-10-07 ASSESSMENT — PAIN SCALES - GENERAL: PAINLEVEL: NO PAIN (0)

## 2022-10-07 NOTE — PROGRESS NOTES
"SUBJECTIVE:   Aaliyah is a 72 year old who presents for Preventive Visit.    Patient has been advised of split billing requirements and indicates understanding: Yes     Are you in the first 12 months of your Medicare coverage?  No    Healthy Habits:     In general, how would you rate your overall health?  Good    Frequency of exercise:  2-3 days/week    Duration of exercise:  30-45 minutes    Do you usually eat at least 4 servings of fruit and vegetables a day, include whole grains    & fiber and avoid regularly eating high fat or \"junk\" foods?  Yes    Taking medications regularly:  Yes    Medication side effects:  None    Ability to successfully perform activities of daily living:  No assistance needed    Home Safety:  Lack of grab bars in the bathroom    Hearing Impairment:  Difficulty following a conversation in a noisy restaurant or crowded room, feel that people are mumbling or not speaking clearly, need to ask people to speak up or repeat themselves, find that men's voices are easier to understand than woman's and difficulty understanding soft or whispered speech    In the past 6 months, have you been bothered by leaking of urine?  No    In general, how would you rate your overall mental or emotional health?  Fair      PHQ-2 Total Score: 2    Additional concerns today:  No    Colon cancer screening - due. Declines colonoscopy, agreeable to stool test.  Pap - aged out  Mammo - done last year, declines this year  NILSON aponte    Has had shingles three times over the past year, family stressors. After her last outbreak in July, she starting taking Valtrex 1 g daily, which she says is for herpetic ritesh. Has been prescribed by her dentist previously but she is on her last refill.    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 Worried about hearing loss. Would like formal hearing assessment.    Do you feel safe in your environment? Yes    Have " you ever done Advance Care Planning? (For example, a Health Directive, POLST, or a discussion with a medical provider or your loved ones about your wishes): Yes, advance care planning is on file.    Hearing Assessment: not done-- referred to audiology today for formal hearing test.    Fall risk  Fallen 2 or more times in the past year?: No  Any fall with injury in the past year?: No    Cognitive Screening   1) Repeat 3 items (Leader, Season, Table)    2) Clock draw: NORMAL  3) 3 item recall: Recalls 3 objects  Results: 3 items recalled: COGNITIVE IMPAIRMENT LESS LIKELY    Mini-CogTM Copyright S Ehlam. Licensed by the author for use in [facility]; reprinted with permission (soob@[facility]). All rights reserved.      Do you have sleep apnea, excessive snoring or daytime drowsiness?: current study, dental study/work     Reviewed and updated as needed this visit by clinical staff   Tobacco  Allergies  Meds                Reviewed and updated as needed this visit by Provider                   Social History     Tobacco Use     Smoking status: Never Smoker     Smokeless tobacco: Never Used   Substance Use Topics     Alcohol use: Yes     Alcohol/week: 0.0 standard drinks     Comment: 1 glass of wine per night     If you drink alcohol do you typically have >3 drinks per day or >7 drinks per week? No    Alcohol Use 9/30/2022   Prescreen: >3 drinks/day or >7 drinks/week? No   Prescreen: >3 drinks/day or >7 drinks/week? -   AUDIT SCORE  -       Eye exam with ophthalmology on this date: [facility] for eye exams, [place].  Dental study for Sleep apnea.    Patient will fax to medical records.     Current providers sharing in care for this patient include:   Patient Care Team:  Jasmin Galevz MD as PCP - General (Pediatrics)  Jasmin Galvez MD as Assigned PCP    The following health maintenance items are reviewed in Epic and correct as of today:  Health Maintenance   Topic Date Due     ZOSTER IMMUNIZATION (2 of  3) 06/28/2013     COVID-19 Vaccine (3 - Booster for Moderna series) 04/22/2021     MAMMO SCREENING  10/27/2022     HEMOGLOBIN  12/03/2022     DTAP/TDAP/TD IMMUNIZATION (3 - Td or Tdap) 06/14/2023     PAP FOLLOW-UP  09/16/2023     HPV FOLLOW-UP  09/16/2023     A1C  10/03/2023     BMP  10/03/2023     CMP  10/03/2023     LIPID  10/03/2023     MICROALBUMIN  10/03/2023     MEDICARE ANNUAL WELLNESS VISIT  10/07/2023     ANNUAL REVIEW OF HM ORDERS  10/07/2023     FALL RISK ASSESSMENT  10/07/2023     DEXA  11/13/2024     COLORECTAL CANCER SCREENING  01/06/2027     ADVANCE CARE PLANNING  10/07/2027     HEPATITIS C SCREENING  Completed     PHQ-2 (once per calendar year)  Completed     INFLUENZA VACCINE  Completed     Pneumococcal Vaccine: 65+ Years  Completed     URINALYSIS  Completed     IPV IMMUNIZATION  Aged Out     MENINGITIS IMMUNIZATION  Aged Out     HEPATITIS B IMMUNIZATION  Aged Out     BP Readings from Last 3 Encounters:   10/07/22 128/64   12/03/21 (!) 154/80   10/05/21 126/56    Wt Readings from Last 3 Encounters:   10/07/22 60.3 kg (133 lb)   10/05/21 61.4 kg (135 lb 6.4 oz)   09/16/20 62.1 kg (137 lb)              Review of Systems   Constitutional: Negative for chills and fever.   HENT: Positive for congestion and hearing loss. Negative for ear pain and sore throat.    Eyes: Negative for pain and visual disturbance.   Respiratory: Negative for cough and shortness of breath.    Cardiovascular: Negative for chest pain, palpitations and peripheral edema.   Gastrointestinal: Negative for abdominal pain, constipation, diarrhea, heartburn, hematochezia and nausea.   Breasts:  Negative for tenderness, breast mass and discharge.   Genitourinary: Negative for dysuria, frequency, genital sores, hematuria, pelvic pain, urgency, vaginal bleeding and vaginal discharge.   Musculoskeletal: Positive for arthralgias and myalgias. Negative for joint swelling.   Skin: Positive for rash.   Neurological: Positive for dizziness,  "headaches and paresthesias. Negative for weakness.   Psychiatric/Behavioral: Negative for mood changes. The patient is not nervous/anxious.        OBJECTIVE:   /64 (BP Location: Right arm, Patient Position: Sitting, Cuff Size: Adult Regular)   Pulse 73   Temp 98.4  F (36.9  C) (Tympanic)   Resp 16   Ht 1.54 m (5' 0.63\")   Wt 60.3 kg (133 lb)   SpO2 98%   BMI 25.44 kg/m   Estimated body mass index is 25.44 kg/m  as calculated from the following:    Height as of this encounter: 1.54 m (5' 0.63\").    Weight as of this encounter: 60.3 kg (133 lb).  Physical Exam  Constitutional: appears to be in no acute distress, comfortable, pleasant.   Eyes: anicteric, conjunctiva clear without drainage or erythema. LUCERO.   Ears, Nose and Throat: tympanic membranes gray with LR,  nose without nasal discharge. OP: no erythema to posterior pharynx, negative post nasal drainage, tonsils +1 no erythema or exudate.  Neck: supple, thyroid palpable,not enlarged, no nodules   Breast: Exam deferred (deferred after discussion of exam options with patient, no symptoms or concerns).   Cardiovascular: regular rate and rhythm, normal S1 and S2, no murmurs, rubs or gallops, peripheral pulses full and symmetric; negative peripheral edema   Respiratory: Air entry throughout. Breathing pattern unlabored without the use of accessory muscles. Clear to auscultation A and P, no wheezes or crackles, normal breath sounds.    Gastrointestinal: rounded, soft. Positive bowel sounds x4, nontender, no masses.   Genitourinary: Exam deferred (deferred after discussion of exam options with patient, no symptoms or concerns, pap not indicated due to age).   Musculoskeletal: full range of motion, no edema.   Skin: pink, turgor smooth and elastic. Negative for lesions or dryness.  Neurological: normal gait, no tremor.   Psychological: appropriate mood and affect.   Lymphatic: no cervical, axillary, supraclavicular, or infraclavicular " lymphadenopathy.    Diagnostic Test Results:  Labs reviewed in Epic    ASSESSMENT / PLAN:   (Z00.00) Encounter for Medicare annual wellness exam  (primary encounter diagnosis)  Age appropriate screening and preventative care have been addressed today. Vaccinations have been updated. Patient has been advised to follow a balanced diet with adequate calcium and vitamin D. They have been advised to undertake routine aerobic activity and they were counseled on healthy weight maintenance. Recommend annual vision exams as well as biannual dental exams. They will follow up for annual physical again in one year.   - Colon cancer screening - due. Declines colonoscopy, agreeable to stool test.  - Pap - aged out  - Mammo - done last year, declines this year  - DEXA - utd  - Refuses shingrix today although does plan to obtain eventually - concerns about cost.  - Refuses COVID-19 booster today.     (Z12.11) Special screening for malignant neoplasm of colon  Declines colonoscopy. Agreeable to cologuard, ordered.  - COLOGUARD([employer])          (I10) Benign essential hypertension  (N18.31) Chronic kidney disease, stage 3a (H)  Controlled on lisinopril 10 mg daily and chlorthalidone 25 mg daily. Recent metabolic panel revealed elevated Scr associated with microalbuminuria so it was recommend by PCP that she increase lisinopril to 20 mg daily which she started doing today. New rx sent. Recommend she return in 2-3 weeks for BMP recheck but she is hesitant due to cost. Reviewed risks of not rechecking BMP, which she verbalized understanding of and will consider returning to clinic for lab recheck as recommended.  - Basic metabolic panel  (Ca, Cl, CO2, Creat, Gluc, K, Na, BUN)  - lisinopril (ZESTRIL) 20 MG tablet  - chlorthalidone (HYGROTON) 25 MG tablet    (F39) Mood disorder (H)  Stopped fluoxetine. No concerns today.     (H91.93) Bilateral hearing loss, unspecified hearing loss type  - Adult Audiology  Referral         "  (Z86.19) H/O hernik awad  Has had shingles three times over the past year, which she attributes to increased family stressors. After her last outbreak in July, she starting taking Valtrex 1 g daily as suppressive therapy, which she says is for herpetic ritesh. Has been prescribed by her dentist previously but she is on her last refill. She is not set on continuing suppressive therapy. Recommended she follow-up with PCP in 3 months via e-visit to discuss discontinuing if no further outbreaks.  -  valACYclovir (VALTREX) 1000 mg tablet          (Z23) Encounter for administration of vaccine  - INFLUENZA, QUAD, HIGH DOSE, PF, 65YR + (FLUZONE HD)           Follow-up:  - Return for labs in 2-3 weeks.      COUNSELING:  Reviewed preventive health counseling, as reflected in patient instructions  Special attention given to:       Healthy diet/nutrition       Vision screening       Dental care       Immunizations       Osteoporosis prevention/bone health       Colon cancer screening    Estimated body mass index is 25.44 kg/m  as calculated from the following:    Height as of this encounter: 1.54 m (5' 0.63\").    Weight as of this encounter: 60.3 kg (133 lb).        She reports that she has never smoked. She has never used smokeless tobacco.      Appropriate preventive services were discussed with this patient, including applicable screening as appropriate for cardiovascular disease, diabetes, osteopenia/osteoporosis, and glaucoma.  As appropriate for age/gender, discussed screening for colorectal cancer, prostate cancer, breast cancer, and cervical cancer. Checklist reviewing preventive services available has been given to the patient.    Reviewed patients plan of care and provided an AVS. The Intermediate Care Plan ( asthma action plan, low back pain action plan, and migraine action plan) for Aaliyah meets the Care Plan requirement. This Care Plan has been established and reviewed with the Patient.    Counseling " Resources:  ATP IV Guidelines  Pooled Cohorts Equation Calculator  Breast Cancer Risk Calculator  Breast Cancer: Medication to Reduce Risk  FRAX Risk Assessment  ICSI Preventive Guidelines  Dietary Guidelines for Americans, 2010  USDA's MyPlate  ASA Prophylaxis  Lung CA Screening    PANKAJ Lozada [facility] ALEX    Identified Health Risks:

## 2022-11-10 DIAGNOSIS — F39 MOOD DISORDER (H): ICD-10-CM

## 2022-11-11 RX ORDER — HYDROXYZINE HYDROCHLORIDE 25 MG/1
25 TABLET, FILM COATED ORAL 3 TIMES DAILY PRN
Qty: 60 TABLET | Refills: 1 | Status: SHIPPED | OUTPATIENT
Start: 2022-11-11 | End: 2023-03-06

## 2023-01-26 ENCOUNTER — OFFICE VISIT (OUTPATIENT)
Dept: AUDIOLOGY | Facility: CLINIC | Age: 73
End: 2023-01-26
Attending: NURSE PRACTITIONER
Payer: MEDICARE

## 2023-01-26 DIAGNOSIS — H91.93 BILATERAL HEARING LOSS, UNSPECIFIED HEARING LOSS TYPE: ICD-10-CM

## 2023-01-26 DIAGNOSIS — H61.22 IMPACTED CERUMEN OF LEFT EAR: Primary | ICD-10-CM

## 2023-01-26 PROCEDURE — V5299 HEARING SERVICE: HCPCS | Performed by: AUDIOLOGIST

## 2023-01-26 NOTE — PROGRESS NOTES
No charge appointment. Ms. Hayes was referred for hearing testing; she has noted declining hearing ability, worse in the left ear than in the right, over many years. 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 She endorsed bilateral, intermittent tinnitus, which is not debilitating or pulsatile, and left-sided aural fullness and lightheaded dizziness. She denied spinning vertigo, otalgia, otorrhea, or recent illness. She reported that her mother had increasing hearing loss in her later years.     Otoscopy revealed a complete cerumen occlusion in her left ear. Right ear canal was clear. Testing was deferred until cerumen can be safely removed by a physician.     Ms. Hayes was provided with scheduling numbers for both ENT and audiology, with the suggestion of scheduling with ENT first for cerumen removal, and ideally scheduled with audiology (same day) after ear cleaning for hearing testing. Ms. Hayes expressed verbal understanding of this information and plan.     Norbert Schneider, [facility]  [place] Licensed Audiologist [id number]

## 2023-02-20 ENCOUNTER — OFFICE VISIT (OUTPATIENT)
Dept: PEDIATRICS | Facility: CLINIC | Age: 73
End: 2023-02-20
Payer: MEDICARE

## 2023-02-20 ENCOUNTER — TELEPHONE (OUTPATIENT)
Dept: PEDIATRICS | Facility: CLINIC | Age: 73
End: 2023-02-20

## 2023-02-20 VITALS
TEMPERATURE: 97.5 F | HEIGHT: 60 IN | SYSTOLIC BLOOD PRESSURE: 120 MMHG | OXYGEN SATURATION: 100 % | RESPIRATION RATE: 18 BRPM | BODY MASS INDEX: 27.15 KG/M2 | DIASTOLIC BLOOD PRESSURE: 52 MMHG | WEIGHT: 138.3 LBS | HEART RATE: 75 BPM

## 2023-02-20 DIAGNOSIS — N18.31 CHRONIC KIDNEY DISEASE, STAGE 3A (H): ICD-10-CM

## 2023-02-20 DIAGNOSIS — I10 BENIGN ESSENTIAL HYPERTENSION: ICD-10-CM

## 2023-02-20 DIAGNOSIS — F39 MOOD DISORDER (H): ICD-10-CM

## 2023-02-20 DIAGNOSIS — Z01.818 PRE-OP EXAM: Primary | ICD-10-CM

## 2023-02-20 DIAGNOSIS — R94.31 ST SEGMENT CHANGES ON ELECTROCARDIOGRAM: ICD-10-CM

## 2023-02-20 DIAGNOSIS — M15.0 PRIMARY OSTEOARTHRITIS INVOLVING MULTIPLE JOINTS: ICD-10-CM

## 2023-02-20 LAB
ANION GAP SERPL CALCULATED.3IONS-SCNC: 14 MMOL/L (ref 7–15)
BUN SERPL-MCNC: 26.3 MG/DL (ref 8–23)
CALCIUM SERPL-MCNC: 10.4 MG/DL (ref 8.8–10.2)
CHLORIDE SERPL-SCNC: 101 MMOL/L (ref 98–107)
CREAT SERPL-MCNC: 1.43 MG/DL (ref 0.51–0.95)
DEPRECATED HCO3 PLAS-SCNC: 23 MMOL/L (ref 22–29)
GFR SERPL CREATININE-BSD FRML MDRD: 39 ML/MIN/1.73M2
GLUCOSE SERPL-MCNC: 100 MG/DL (ref 70–99)
HGB BLD-MCNC: 11.8 G/DL (ref 11.7–15.7)
POTASSIUM SERPL-SCNC: 3.9 MMOL/L (ref 3.4–5.3)
SODIUM SERPL-SCNC: 138 MMOL/L (ref 136–145)

## 2023-02-20 PROCEDURE — 93000 ELECTROCARDIOGRAM COMPLETE: CPT | Performed by: INTERNAL MEDICINE

## 2023-02-20 PROCEDURE — 99215 OFFICE O/P EST HI 40 MIN: CPT | Performed by: INTERNAL MEDICINE

## 2023-02-20 PROCEDURE — 80048 BASIC METABOLIC PNL TOTAL CA: CPT | Performed by: INTERNAL MEDICINE

## 2023-02-20 PROCEDURE — 85018 HEMOGLOBIN: CPT | Performed by: INTERNAL MEDICINE

## 2023-02-20 PROCEDURE — 36415 COLL VENOUS BLD VENIPUNCTURE: CPT | Performed by: INTERNAL MEDICINE

## 2023-02-20 RX ORDER — CHLORTHALIDONE 25 MG/1
12.5 TABLET ORAL DAILY
Qty: 90 TABLET | Refills: 3
Start: 2023-02-20 | End: 2023-04-26

## 2023-02-20 ASSESSMENT — PAIN SCALES - GENERAL: PAINLEVEL: MODERATE PAIN (5)

## 2023-02-20 NOTE — PATIENT INSTRUCTIONS
Stress test prior to surgery to make sure that your heart looks good - hold atenolol for 24 hrs prior to testing.     Hold omega 3/fish oil 7 days prior to surgery. Hold chlorthalidone morning of surgery.     Hold ibuprofen 3 days prior to surgery. Ok to take acetaminophen 3g/24hrs (so 1000mg every 8 hrs).     Labs prior to surgery and EKG today.

## 2023-02-20 NOTE — TELEPHONE ENCOUNTER
Prior Authorization Retail Medication Request    Medication/Dose: Hydroyzine HCL tab 25mg  ICD code (if different than what is on RX):  F39  Previously Tried and Failed:  none  Rationale:  Anxiety induced hives    Insurance Name:  Medicare  Insurance ID:  [id number]      Pharmacy Information (if different than what is on RX)  Name:  [facility]  Phone:  [phone]

## 2023-02-20 NOTE — PROGRESS NOTES
[facility] [place]  [address]  [address]  [place] [address]  Phone: [phone]  Fax: [phone]  Primary Provider: Jasmin Galvez  Pre-op Performing Provider: HUGO OCAMPO      PREOPERATIVE EVALUATION:  Today's date: 2/20/2023    Aaliyah Hayes is a 73 year old female who presents for a preoperative evaluation.    Surgical Information:  Surgery/Procedure: right reverse total shoulder replacement  Surgery Location: [facility]  Surgeon: Alyssa  Surgery Date: 3/8/23  Time of Surgery: tbd  Where patient plans to recover: Home  Fax number for surgical facility: Note does not need to be faxed, will be available electronically in Epic.    Type of Anesthesia Anticipated: General and Scalene Block    Assessment & Plan     The proposed surgical procedure is considered INTERMEDIATE risk.    Pre-op exam  Primary osteoarthritis involving multiple joints  Medically optimized to proceed with surgery.     Chronic kidney disease, stage 3a (H)  Stable, labs prior to surgery. On ACE-I.   - Hemoglobin  - Basic metabolic panel  (Ca, Cl, CO2, Creat, Gluc, K, Na, BUN)    Benign essential hypertension  Well controlled - patient has actually decreased her chlorthalidone to 1/2 tab daily. Ok to take this morning of surgery, will hold lisinopril.   - chlorthalidone (HYGROTON) 25 MG tablet; Take 0.5 tablets (12.5 mg) by mouth daily  - Basic metabolic panel  (Ca, Cl, CO2, Creat, Gluc, K, Na, BUN)    Mood disorder (H)  Stable, uses hydroxyzine prn.     ST changes on ECG  Asymptomatic, however diffuse ST changes so will proceed with pre-operative stress testing. Order placed in clinic, TC to assist with scheduling.         Possible Sleep Apnea: patient uses mandibular device for JUNITO, will bring to surgery.          Risks and Recommendations:  The patient has the following additional risks and recommendations for perioperative complications:   - No identified additional risk factors other than  previously addressed    Medication Instructions:  Patient is to take all scheduled medications on the day of surgery EXCEPT for modifications listed below:   - ACE/ARB: May be continued on the day of surgery.    - Beta Blockers: Continue taking on the day of surgery.   - Diuretics: HOLD on the day of surgery.   - ibuprofen (Advil, Motrin): HOLD 1 day before surgery.     RECOMMENDATION:  APPROVAL GIVEN to proceed with proposed procedure, without further diagnostic evaluation.    Total time spent in duration of encounter on day of visit including patient visit, evaluation, documentation: 40 min.    Addendum:  Pre-operative stress testing was negative. Patient medically optimized to proceed with surgery without further work-up.    Yesi Morrison MD  Internal Medicine-Pediatrics      Subjective     HPI related to upcoming procedure: History of osteoarthritis, prior shoulder surgery for rotator cuff injury, planning for total reverse replacement.     Preop Questions 2/13/2023   1. Have you ever had a heart attack or stroke? No   2. Have you ever had surgery on your heart or blood vessels, such as a stent placement, a coronary artery bypass, or surgery on an artery in your head, neck, heart, or legs? No   3. Do you have chest pain with activity? No   4. Do you have a history of  heart failure? No   5. Do you currently have a cold, bronchitis or symptoms of other infection? No   6. Do you have a cough, shortness of breath, or wheezing? No   7. Do you or anyone in your family have previous history of blood clots? No   8. Do you or does anyone in your family have a serious bleeding problem such as prolonged bleeding following surgeries or cuts? No   9. Have you ever had problems with anemia or been told to take iron pills? YES - following left hip replacement   10. Have you had any abnormal blood loss such as black, tarry or bloody stools, or abnormal vaginal bleeding? No   11. Have you ever had a blood transfusion? No   12.  Are you willing to have a blood transfusion if it is medically needed before, during, or after your surgery? Yes   13. Have you or any of your relatives ever had problems with anesthesia? No   14. Do you have sleep apnea, excessive snoring or daytime drowsiness? YES - patient with a hx of sleep apnea, uses mandibular device   14a. Do you have a CPAP machine? No   15. Do you have any artifical heart valves or other implanted medical devices like a pacemaker, defibrillator, or continuous glucose monitor? No   16. Do you have artificial joints? YES - bilateral hips, left shoulder.   17. Are you allergic to latex? No   18. Is there any chance that you may be pregnant? -        Health Care Directive:  Patient does not have a Health Care Directive or Living Will: Advance Directive received and scanned. Click on Code in the patient header to view.    Preoperative Review of :   reviewed - controlled substances prescribed by other outside provider(s).      Status of Chronic Conditions:  See problem list for active medical problems.  Problems all longstanding and stable, except as noted/documented.  See ROS for pertinent symptoms related to these conditions.      Review of Systems  Constitutional, neuro, ENT, endocrine, pulmonary, cardiac, gastrointestinal, genitourinary, musculoskeletal, integument and psychiatric systems are negative, except as otherwise noted.    Patient Active Problem List    Diagnosis Date Noted     Mood disorder (H) 10/07/2022     Priority: Medium     Chronic kidney disease, stage 3a (H) 10/07/2022     Priority: Medium     S/p reverse total shoulder arthroplasty 11/20/2018     Priority: Medium     Herpetic ritesh 09/27/2017     Priority: Medium     Status post total hip replacement, left 09/26/2017     Priority: Medium     Osteoarthritis of multiple joints 06/16/2017     Priority: Medium     Benign essential hypertension 02/14/2014     Priority: Medium     Lisinopril made patient feel fidgety.        Hyperlipidemia with target LDL less than 130 02/14/2014     Priority: Medium     Diagnosis updated by automated process. Provider to review and confirm.       Allergic rhinitis due to other allergen 02/14/2014     Priority: Medium     Atypical squamous cells cannot exclude high grade squamous intraepithelial lesion on cytologic smear of cervix (ASC-H) 02/14/2014     Priority: Medium     02/14/14 ASCUS, Neg HPV. Repeat co-testing in 3 yrs per ASCCP guidelines.   06/09/17 ASC-H, Neg HPV, Presence of endometrial cells. Plan for colp and possible EMB  08/18/17 [place]= SELENE 1. EMB= Benign. Plan 1 yr co-test.  07/12/18: NIL pap, Neg HR HPV result. Plan 1 year cotest per provider.  08/27/19: NIL Pap, Neg HR HPV result. Plan cotest in 3 years per provider.   9/16/20 NIL, Neg HPV. Plan 3 yr co-test       History of bowel resection 11/02/2018     Priority: Low     Due to perforation during routine colonoscopy, repaired.        Insomnia 02/14/2014     Priority: Low      Past Medical History:   Diagnosis Date     Arthritis      Atypical squamous cells cannot exclude high grade squamous intraepithelial lesion on cytologic smear of cervix (ASC-H) 06/09/2017 06/09/17 ASC-H, Neg HPV, Endometrial cells present. [place]= SELENE 1, EMB= Benign     Facial cellulitis 11/22/2015     HTN (hypertension)      Perforated sigmoid colon (H) 1/6/2017     Past Surgical History:   Procedure Laterality Date     APPENDECTOMY  2012     COLECTOMY LEFT N/A 1/6/2017    Procedure: COLECTOMY LEFT;  Surgeon: David Gaitan MD;  Location:  OR     COLONOSCOPY N/A 1/6/2017    Procedure: COLONOSCOPY;  Surgeon: Regis Mckoy MD;  Location:  GI     LAPAROSCOPIC ASSISTED SIGMOID COLECTOMY N/A 1/6/2017    Procedure: LAPAROSCOPIC ASSISTED SIGMOID COLECTOMY;  Surgeon: David Gaitan MD;  Location:  OR     ORTHOPEDIC SURGERY       REVERSE ARTHROPLASTY SHOULDER Left 11/20/2018    Procedure: Left reverse total shoulder arthroplasty;  Surgeon:  Cricket Shah MD;  Location: [facility] OR     SURGICAL HISTORY OF -   09/2010    Rotator cuff repair-right shoulder     SURGICAL HISTORY OF -   12/2011    Right hip replacement     SURGICAL HISTORY OF -   2005    ACL and MCL left knee     Current Outpatient Medications   Medication Sig Dispense Refill     Ascorbic Acid (VITAMIN C PO) Take 1,000 mg by mouth daily       atenolol (TENORMIN) 25 MG tablet Take 0.5 tablets (12.5 mg) by mouth daily 45 tablet 3     calcium carbonate (OSCAL 500) 1250 (500 CA) MG TABS tablet Take 1,250 mg by mouth daily       chlorthalidone (HYGROTON) 25 MG tablet Take 1 tablet (25 mg) by mouth daily 90 tablet 3     hydrOXYzine (ATARAX) 25 MG tablet Take 1 tablet (25 mg) by mouth 3 times daily as needed for itching, anxiety or other (sleep) 60 tablet 1     lisinopril (ZESTRIL) 20 MG tablet Take 1 tablet (20 mg) by mouth daily 90 tablet 3     multivitamin w/minerals (THERA-VIT-M) tablet Take 1 tablet by mouth every morning  100 tablet 3     NIACIN ER PO        Omega-3 Fatty Acids (OMEGA 3 500) 500 MG CAPS Take 500 mg by mouth daily       potassium gluconate 2.5 MEQ TABS Take 5 mEq by mouth daily       valACYclovir (VALTREX) 1000 mg tablet Take 1 tablet (1,000 mg) by mouth daily 90 tablet 0     vitamin B-Complex Take 1 tablet by mouth daily         Allergies   Allergen Reactions     Blood Transfusion Related (Informational Only) Other (See Comments)     Patient has a history of a clinically significant antibody against RBC antigens.  A delay in compatible RBCs may occur.     Ciprofloxacin      Facial swelling, tingling on her upper lip, throat slightly closed, racing heart     Oxycodone      Nightmares, heart palpations     Sulfa Drugs      Eye reaction to sulfa-based eye drop        Social History     Tobacco Use     Smoking status: Never     Smokeless tobacco: Never   Substance Use Topics     Alcohol use: Yes     Alcohol/week: 0.0 standard drinks     Comment: 1 glass of wine per night       History    Drug Use No         Objective     /52 (BP Location: Right arm, Patient Position: Sitting, Cuff Size: Adult Regular)   Pulse 75   Temp 97.5  F (36.4  C) (Tympanic)   Resp 18   Ht 1.524 m (5')   Wt 62.7 kg (138 lb 4.8 oz)   SpO2 100%   BMI 27.01 kg/m      Physical Exam    GENERAL APPEARANCE: healthy, alert and no distress     EYES: EOMI, PERRL     HENT: ear canals and TM's normal and nose and mouth without ulcers or lesions     NECK: no adenopathy, no asymmetry, masses, or scars and thyroid normal to palpation     RESP: lungs clear to auscultation - no rales, rhonchi or wheezes     CV: regular rates and rhythm, normal S1 S2, no S3 or S4 and no murmur, click or rub     ABDOMEN:  soft, nontender, no HSM or masses and bowel sounds normal     MS: extremities normal- no gross deformities noted, no evidence of inflammation in joints, FROM in all extremities.     SKIN: no suspicious lesions or rashes     NEURO: Normal strength and tone, sensory exam grossly normal, mentation intact and speech normal     PSYCH: mentation appears normal. and affect normal/bright     LYMPHATICS: No cervical adenopathy    Recent Labs   Lab Test 10/03/22  0943 12/03/21  1604 09/30/21  1241 09/30/21  0900   HGB  --  12.1  --   --    PLT  --  273  --   --      --   --  133   POTASSIUM 4.7  --   --  4.5   CR 1.22*  --   --  0.96   A1C 4.9  --  5.5  --         Diagnostics:  Labs pending at this time.  Results will be reviewed when available.   EKG: appears normal, NSR, ST changes noted in precordial leads, Lead II, aVF. , no LVH by voltage criteria, unchanged from previous tracings    Revised Cardiac Risk Index (RCRI):  The patient has the following serious cardiovascular risks for perioperative complications:   - No serious cardiac risks = 0 points     RCRI Interpretation: 0 points: Class I (very low risk - 0.4% complication rate)           Signed Electronically by: Yesi Umanzor MD  Copy of this evaluation report is provided  to requesting physician.

## 2023-02-23 NOTE — TELEPHONE ENCOUNTER
Central Prior Authorization Team  Phone: [phone]    PA Initiation    Medication: Hydroyzine HCL tab 25mg  Insurance Company: [employer] - Phone [phone] Fax [phone]  Pharmacy Filling the Rx: [facility] [address] [place] [address]  Filling Pharmacy Phone: [phone]  Filling Pharmacy Fax:    Start Date: 2/23/2023

## 2023-02-23 NOTE — TELEPHONE ENCOUNTER
Prior Authorization Approval    Authorization Effective Date: 1/1/2023  Authorization Expiration Date: 2/23/2024  Medication: Hydroyzine HCL tab 25mg- APPROVED   Approved Dose/Quantity:   Reference #:     Insurance Company: [email or web address] - Phone [phone] Fax [phone]  Expected CoPay:       CoPay Card Available:      Foundation Assistance Needed:    Which Pharmacy is filling the prescription (Not needed for infusion/clinic administered): [facility] [address] [place], [place] - [address]  Pharmacy Notified: Yes  Patient Notified:  **Instructed pharmacy to notify patient when script is ready to /ship.**

## 2023-02-27 ENCOUNTER — HOSPITAL ENCOUNTER (OUTPATIENT)
Dept: CARDIOLOGY | Facility: CLINIC | Age: 73
Discharge: HOME | End: 2023-02-27
Attending: INTERNAL MEDICINE
Payer: MEDICARE

## 2023-02-27 ENCOUNTER — HOSPITAL ENCOUNTER (OUTPATIENT)
Dept: NUCLEAR MEDICINE | Facility: CLINIC | Age: 73
Setting detail: NUCLEAR MEDICINE
Discharge: HOME | End: 2023-02-27
Attending: INTERNAL MEDICINE
Payer: MEDICARE

## 2023-02-27 DIAGNOSIS — R94.31 ST SEGMENT CHANGES ON ELECTROCARDIOGRAM: ICD-10-CM

## 2023-02-27 LAB
CV STRESS MAX HR HE: 107
RATE PRESSURE PRODUCT: NORMAL
STRESS ECHO BASELINE DIASTOLIC HE: 76
STRESS ECHO BASELINE HR: 88 BPM
STRESS ECHO BASELINE SYSTOLIC BP: 123
STRESS ECHO CALCULATED PERCENT HR: 72 %
STRESS ECHO LAST STRESS DIASTOLIC BP: 79
STRESS ECHO LAST STRESS SYSTOLIC BP: 110
STRESS ECHO TARGET HR: 149

## 2023-02-27 PROCEDURE — G1010 CDSM STANSON: HCPCS | Performed by: INTERNAL MEDICINE

## 2023-02-27 PROCEDURE — 78452 HT MUSCLE IMAGE SPECT MULT: CPT | Mod: 26 | Performed by: INTERNAL MEDICINE

## 2023-02-27 PROCEDURE — 93018 CV STRESS TEST I&R ONLY: CPT | Performed by: INTERNAL MEDICINE

## 2023-02-27 PROCEDURE — 250N000011 HC RX IP 250 OP 636

## 2023-02-27 PROCEDURE — 93017 CV STRESS TEST TRACING ONLY: CPT

## 2023-02-27 PROCEDURE — 78452 HT MUSCLE IMAGE SPECT MULT: CPT | Mod: ME

## 2023-02-27 PROCEDURE — 343N000001 HC RX 343: Performed by: INTERNAL MEDICINE

## 2023-02-27 PROCEDURE — A9502 TC99M TETROFOSMIN: HCPCS | Performed by: INTERNAL MEDICINE

## 2023-02-27 PROCEDURE — 93016 CV STRESS TEST SUPVJ ONLY: CPT | Performed by: INTERNAL MEDICINE

## 2023-02-27 RX ORDER — REGADENOSON 0.08 MG/ML
0.4 INJECTION, SOLUTION INTRAVENOUS ONCE
Status: DISCONTINUED | OUTPATIENT
Start: 2023-02-27 | End: 2023-02-28 | Stop reason: HOSPADM

## 2023-02-27 RX ORDER — REGADENOSON 0.08 MG/ML
INJECTION, SOLUTION INTRAVENOUS
Status: COMPLETED
Start: 2023-02-27 | End: 2023-02-27

## 2023-02-27 RX ADMIN — REGADENOSON 0.4 MG: 0.08 INJECTION, SOLUTION INTRAVENOUS at 12:20

## 2023-02-27 RX ADMIN — TETROFOSMIN 33 MILLICURIE: 1.38 INJECTION, POWDER, LYOPHILIZED, FOR SOLUTION INTRAVENOUS at 12:28

## 2023-02-27 RX ADMIN — TETROFOSMIN 11 MILLICURIE: 1.38 INJECTION, POWDER, LYOPHILIZED, FOR SOLUTION INTRAVENOUS at 11:04

## 2023-03-01 RX ORDER — MULTIVITAMIN WITH IRON
1 TABLET ORAL AT BEDTIME
COMMUNITY

## 2023-03-04 DIAGNOSIS — F39 MOOD DISORDER (H): ICD-10-CM

## 2023-03-06 RX ORDER — HYDROXYZINE HYDROCHLORIDE 25 MG/1
25 TABLET, FILM COATED ORAL 3 TIMES DAILY PRN
Qty: 60 TABLET | Refills: 0 | Status: SHIPPED | OUTPATIENT
Start: 2023-03-06 | End: 2023-05-05

## 2023-03-06 NOTE — PHARMACY-ADMISSION MEDICATION HISTORY
Medication reconciliation interview completed by pre-admitting nurse     Reviewed by Tara Figueroa RN (Registered Nurse) on 03/01/23 at 1253    Reviewed by pharmacy. No further clarifications needed.       Prior to Admission medications    Medication Sig Last Dose Taking? Auth Provider Long Term End Date   Ascorbic Acid (VITAMIN C PO) Take 1,000 mg by mouth daily  Yes Reported, Patient     atenolol (TENORMIN) 25 MG tablet Take 0.5 tablets (12.5 mg) by mouth daily  Yes Jasmin Galvez MD Yes    calcium carbonate (OSCAL 500) 1250 (500 CA) MG TABS tablet Take 1,250 mg by mouth daily  Yes Reported, Patient     chlorthalidone (HYGROTON) 25 MG tablet Take 0.5 tablets (12.5 mg) by mouth daily  Yes Yesi Morrison MD Yes    lisinopril (ZESTRIL) 20 MG tablet Take 1 tablet (20 mg) by mouth daily  Yes Jenae Robles APRN CNP Yes    multivitamin w/minerals (THERA-VIT-M) tablet Take 1 tablet by mouth every morning   Yes Jessie High MD     Omega-3 Fatty Acids (OMEGA 3 500) 500 MG CAPS Take 500 mg by mouth daily  Yes Reported, Patient     potassium gluconate 2.5 MEQ TABS Take 5 mEq by mouth daily  Yes Reported, Patient     valACYclovir (VALTREX) 1000 mg tablet Take 1 tablet (1,000 mg) by mouth daily  Patient taking differently: Take 1,000 mg by mouth daily as needed  Yes Jenae Robles APRN CNP Yes    vitamin B-Complex Take 1 tablet by mouth daily  Yes Reported, Patient     diphenhydrAMINE (BENADRYL) 25 MG tablet Take 25 mg by mouth nightly as needed   Reported, Patient     hydrOXYzine (ATARAX) 25 MG tablet Take 1 tablet (25 mg) by mouth 3 times daily as needed for itching, anxiety or other (sleep)   Yesi Morrison MD     magnesium 250 MG tablet Take 1 tablet by mouth At Bedtime   Reported, Patient

## 2023-03-08 ENCOUNTER — ANESTHESIA EVENT (OUTPATIENT)
Dept: SURGERY | Facility: CLINIC | Age: 73
End: 2023-03-08
Payer: MEDICARE

## 2023-03-08 ENCOUNTER — ANESTHESIA (OUTPATIENT)
Dept: SURGERY | Facility: CLINIC | Age: 73
End: 2023-03-08
Payer: MEDICARE

## 2023-03-08 ENCOUNTER — HOSPITAL ENCOUNTER (OUTPATIENT)
Facility: CLINIC | Age: 73
Discharge: HOME | End: 2023-03-09
Attending: ORTHOPAEDIC SURGERY | Admitting: ORTHOPAEDIC SURGERY
Payer: MEDICARE

## 2023-03-08 ENCOUNTER — APPOINTMENT (OUTPATIENT)
Dept: GENERAL RADIOLOGY | Facility: CLINIC | Age: 73
End: 2023-03-08
Attending: PHYSICIAN ASSISTANT
Payer: MEDICARE

## 2023-03-08 DIAGNOSIS — Z96.611 S/P REVERSE TOTAL SHOULDER ARTHROPLASTY, RIGHT: Primary | ICD-10-CM

## 2023-03-08 PROCEDURE — 250N000025 HC SEVOFLURANE, PER MIN: Performed by: ORTHOPAEDIC SURGERY

## 2023-03-08 PROCEDURE — 710N000009 HC RECOVERY PHASE 1, LEVEL 1, PER MIN: Performed by: ORTHOPAEDIC SURGERY

## 2023-03-08 PROCEDURE — 272N000001 HC OR GENERAL SUPPLY STERILE: Performed by: ORTHOPAEDIC SURGERY

## 2023-03-08 PROCEDURE — 258N000003 HC RX IP 258 OP 636: Performed by: ANESTHESIOLOGY

## 2023-03-08 PROCEDURE — 250N000011 HC RX IP 250 OP 636

## 2023-03-08 PROCEDURE — 250N000011 HC RX IP 250 OP 636: Performed by: PHYSICIAN ASSISTANT

## 2023-03-08 PROCEDURE — 250N000013 HC RX MED GY IP 250 OP 250 PS 637: Performed by: PHYSICIAN ASSISTANT

## 2023-03-08 PROCEDURE — 250N000009 HC RX 250

## 2023-03-08 PROCEDURE — 360N000077 HC SURGERY LEVEL 4, PER MIN: Performed by: ORTHOPAEDIC SURGERY

## 2023-03-08 PROCEDURE — 999N000065 XR SHOULDER RIGHT PORT G/E 2 VIEWS: Mod: RT

## 2023-03-08 PROCEDURE — 258N000003 HC RX IP 258 OP 636: Performed by: PHYSICIAN ASSISTANT

## 2023-03-08 PROCEDURE — C1713 ANCHOR/SCREW BN/BN,TIS/BN: HCPCS | Performed by: ORTHOPAEDIC SURGERY

## 2023-03-08 PROCEDURE — 258N000001 HC RX 258: Performed by: ORTHOPAEDIC SURGERY

## 2023-03-08 PROCEDURE — 370N000017 HC ANESTHESIA TECHNICAL FEE, PER MIN: Performed by: ORTHOPAEDIC SURGERY

## 2023-03-08 PROCEDURE — 999N000141 HC STATISTIC PRE-PROCEDURE NURSING ASSESSMENT: Performed by: ORTHOPAEDIC SURGERY

## 2023-03-08 PROCEDURE — 258N000003 HC RX IP 258 OP 636

## 2023-03-08 PROCEDURE — C1776 JOINT DEVICE (IMPLANTABLE): HCPCS | Performed by: ORTHOPAEDIC SURGERY

## 2023-03-08 PROCEDURE — L3670 SO ACRO/CLAV CAN WEB PRE OTS: HCPCS

## 2023-03-08 PROCEDURE — 250N000009 HC RX 250: Performed by: ANESTHESIOLOGY

## 2023-03-08 DEVICE — SCREW CENTRAL 6.5X30MM DWJ130: Type: IMPLANTABLE DEVICE | Site: SHOULDER | Status: FUNCTIONAL

## 2023-03-08 DEVICE — GLEOSPHERE LATERALIZED AP REVERSED 36MM: Type: IMPLANTABLE DEVICE | Site: SHOULDER | Status: FUNCTIONAL

## 2023-03-08 DEVICE — BASEPLATE STD 25MM: Type: IMPLANTABLE DEVICE | Site: SHOULDER | Status: FUNCTIONAL

## 2023-03-08 DEVICE — IMPLANTABLE DEVICE
Type: IMPLANTABLE DEVICE | Site: SHOULDER | Status: FUNCTIONAL
Brand: TORNIER FLEX SHOULDER SYSTEM

## 2023-03-08 DEVICE — SCREW PERIPHERAL 5.0X34MM DWJ334: Type: IMPLANTABLE DEVICE | Site: SHOULDER | Status: FUNCTIONAL

## 2023-03-08 DEVICE — SCREW PERIPHERAL 38MM: Type: IMPLANTABLE DEVICE | Site: SHOULDER | Status: FUNCTIONAL

## 2023-03-08 RX ORDER — LABETALOL HYDROCHLORIDE 5 MG/ML
10 INJECTION, SOLUTION INTRAVENOUS
Status: DISCONTINUED | OUTPATIENT
Start: 2023-03-08 | End: 2023-03-08 | Stop reason: HOSPADM

## 2023-03-08 RX ORDER — KETOROLAC TROMETHAMINE 30 MG/ML
INJECTION, SOLUTION INTRAMUSCULAR; INTRAVENOUS PRN
Status: DISCONTINUED | OUTPATIENT
Start: 2023-03-08 | End: 2023-03-08

## 2023-03-08 RX ORDER — AMOXICILLIN 250 MG
1 CAPSULE ORAL 2 TIMES DAILY
Status: DISCONTINUED | OUTPATIENT
Start: 2023-03-08 | End: 2023-03-09 | Stop reason: HOSPADM

## 2023-03-08 RX ORDER — ACETAMINOPHEN 325 MG/1
975 TABLET ORAL EVERY 8 HOURS
Status: DISCONTINUED | OUTPATIENT
Start: 2023-03-08 | End: 2023-03-09 | Stop reason: HOSPADM

## 2023-03-08 RX ORDER — ONDANSETRON 4 MG/1
4 TABLET, ORALLY DISINTEGRATING ORAL EVERY 30 MIN PRN
Status: DISCONTINUED | OUTPATIENT
Start: 2023-03-08 | End: 2023-03-08 | Stop reason: HOSPADM

## 2023-03-08 RX ORDER — MAGNESIUM SULFATE HEPTAHYDRATE 40 MG/ML
2 INJECTION, SOLUTION INTRAVENOUS
Status: DISCONTINUED | OUTPATIENT
Start: 2023-03-08 | End: 2023-03-08 | Stop reason: HOSPADM

## 2023-03-08 RX ORDER — HYDRALAZINE HYDROCHLORIDE 20 MG/ML
2.5-5 INJECTION INTRAMUSCULAR; INTRAVENOUS EVERY 10 MIN PRN
Status: DISCONTINUED | OUTPATIENT
Start: 2023-03-08 | End: 2023-03-08 | Stop reason: HOSPADM

## 2023-03-08 RX ORDER — TRANEXAMIC ACID 650 MG/1
1950 TABLET ORAL ONCE
Status: COMPLETED | OUTPATIENT
Start: 2023-03-08 | End: 2023-03-08

## 2023-03-08 RX ORDER — HYDROCODONE BITARTRATE AND ACETAMINOPHEN 5; 325 MG/1; MG/1
1-2 TABLET ORAL EVERY 4 HOURS PRN
Qty: 40 TABLET | Refills: 0 | Status: SHIPPED | OUTPATIENT
Start: 2023-03-08 | End: 2023-05-10

## 2023-03-08 RX ORDER — SODIUM CHLORIDE, SODIUM LACTATE, POTASSIUM CHLORIDE, CALCIUM CHLORIDE 600; 310; 30; 20 MG/100ML; MG/100ML; MG/100ML; MG/100ML
INJECTION, SOLUTION INTRAVENOUS CONTINUOUS
Status: DISCONTINUED | OUTPATIENT
Start: 2023-03-08 | End: 2023-03-09 | Stop reason: HOSPADM

## 2023-03-08 RX ORDER — CEFAZOLIN SODIUM/WATER 2 G/20 ML
2 SYRINGE (ML) INTRAVENOUS
Status: COMPLETED | OUTPATIENT
Start: 2023-03-08 | End: 2023-03-08

## 2023-03-08 RX ORDER — ALBUTEROL SULFATE 0.83 MG/ML
2.5 SOLUTION RESPIRATORY (INHALATION) EVERY 4 HOURS PRN
Status: DISCONTINUED | OUTPATIENT
Start: 2023-03-08 | End: 2023-03-08 | Stop reason: HOSPADM

## 2023-03-08 RX ORDER — BISACODYL 10 MG
10 SUPPOSITORY, RECTAL RECTAL DAILY PRN
Status: DISCONTINUED | OUTPATIENT
Start: 2023-03-08 | End: 2023-03-09 | Stop reason: HOSPADM

## 2023-03-08 RX ORDER — AMOXICILLIN 250 MG
1-2 CAPSULE ORAL 2 TIMES DAILY
Qty: 30 TABLET | Refills: 0 | Status: SHIPPED | OUTPATIENT
Start: 2023-03-08 | End: 2023-05-10

## 2023-03-08 RX ORDER — HYDROMORPHONE HCL IN WATER/PF 6 MG/30 ML
0.2 PATIENT CONTROLLED ANALGESIA SYRINGE INTRAVENOUS
Status: DISCONTINUED | OUTPATIENT
Start: 2023-03-08 | End: 2023-03-09 | Stop reason: HOSPADM

## 2023-03-08 RX ORDER — LIDOCAINE 40 MG/G
CREAM TOPICAL
Status: DISCONTINUED | OUTPATIENT
Start: 2023-03-08 | End: 2023-03-08 | Stop reason: HOSPADM

## 2023-03-08 RX ORDER — CEFAZOLIN SODIUM 1 G/3ML
1 INJECTION, POWDER, FOR SOLUTION INTRAMUSCULAR; INTRAVENOUS EVERY 8 HOURS
Status: COMPLETED | OUTPATIENT
Start: 2023-03-08 | End: 2023-03-09

## 2023-03-08 RX ORDER — SODIUM CHLORIDE, SODIUM LACTATE, POTASSIUM CHLORIDE, CALCIUM CHLORIDE 600; 310; 30; 20 MG/100ML; MG/100ML; MG/100ML; MG/100ML
INJECTION, SOLUTION INTRAVENOUS CONTINUOUS
Status: DISCONTINUED | OUTPATIENT
Start: 2023-03-08 | End: 2023-03-08 | Stop reason: HOSPADM

## 2023-03-08 RX ORDER — ATENOLOL 25 MG/1
12.5 TABLET ORAL DAILY
Status: DISCONTINUED | OUTPATIENT
Start: 2023-03-08 | End: 2023-03-09 | Stop reason: HOSPADM

## 2023-03-08 RX ORDER — ONDANSETRON 2 MG/ML
4 INJECTION INTRAMUSCULAR; INTRAVENOUS EVERY 6 HOURS PRN
Status: DISCONTINUED | OUTPATIENT
Start: 2023-03-08 | End: 2023-03-09 | Stop reason: HOSPADM

## 2023-03-08 RX ORDER — ONDANSETRON 2 MG/ML
INJECTION INTRAMUSCULAR; INTRAVENOUS PRN
Status: DISCONTINUED | OUTPATIENT
Start: 2023-03-08 | End: 2023-03-08

## 2023-03-08 RX ORDER — FENTANYL CITRATE 50 UG/ML
25 INJECTION, SOLUTION INTRAMUSCULAR; INTRAVENOUS EVERY 5 MIN PRN
Status: DISCONTINUED | OUTPATIENT
Start: 2023-03-08 | End: 2023-03-08 | Stop reason: HOSPADM

## 2023-03-08 RX ORDER — ONDANSETRON 4 MG/1
4 TABLET, ORALLY DISINTEGRATING ORAL EVERY 6 HOURS PRN
Status: DISCONTINUED | OUTPATIENT
Start: 2023-03-08 | End: 2023-03-09 | Stop reason: HOSPADM

## 2023-03-08 RX ORDER — LISINOPRIL 20 MG/1
20 TABLET ORAL DAILY
Status: DISCONTINUED | OUTPATIENT
Start: 2023-03-09 | End: 2023-03-09 | Stop reason: HOSPADM

## 2023-03-08 RX ORDER — NALOXONE HYDROCHLORIDE 0.4 MG/ML
0.2 INJECTION, SOLUTION INTRAMUSCULAR; INTRAVENOUS; SUBCUTANEOUS
Status: DISCONTINUED | OUTPATIENT
Start: 2023-03-08 | End: 2023-03-09 | Stop reason: HOSPADM

## 2023-03-08 RX ORDER — ACETAMINOPHEN 325 MG/1
325 TABLET ORAL EVERY 4 HOURS PRN
Qty: 100 TABLET | Refills: 0 | Status: SHIPPED | OUTPATIENT
Start: 2023-03-08

## 2023-03-08 RX ORDER — HYDROXYZINE HYDROCHLORIDE 25 MG/1
25 TABLET, FILM COATED ORAL 3 TIMES DAILY PRN
Status: DISCONTINUED | OUTPATIENT
Start: 2023-03-08 | End: 2023-03-09 | Stop reason: HOSPADM

## 2023-03-08 RX ORDER — LIDOCAINE HYDROCHLORIDE 40 MG/ML
SOLUTION TOPICAL PRN
Status: DISCONTINUED | OUTPATIENT
Start: 2023-03-08 | End: 2023-03-08

## 2023-03-08 RX ORDER — HYDROCODONE BITARTRATE AND ACETAMINOPHEN 5; 325 MG/1; MG/1
1-2 TABLET ORAL EVERY 4 HOURS PRN
Status: DISCONTINUED | OUTPATIENT
Start: 2023-03-08 | End: 2023-03-09 | Stop reason: HOSPADM

## 2023-03-08 RX ORDER — ONDANSETRON 2 MG/ML
4 INJECTION INTRAMUSCULAR; INTRAVENOUS EVERY 30 MIN PRN
Status: DISCONTINUED | OUTPATIENT
Start: 2023-03-08 | End: 2023-03-08 | Stop reason: HOSPADM

## 2023-03-08 RX ORDER — FENTANYL CITRATE 50 UG/ML
INJECTION, SOLUTION INTRAMUSCULAR; INTRAVENOUS PRN
Status: DISCONTINUED | OUTPATIENT
Start: 2023-03-08 | End: 2023-03-08

## 2023-03-08 RX ORDER — FENTANYL CITRATE 50 UG/ML
50 INJECTION, SOLUTION INTRAMUSCULAR; INTRAVENOUS EVERY 5 MIN PRN
Status: DISCONTINUED | OUTPATIENT
Start: 2023-03-08 | End: 2023-03-08 | Stop reason: HOSPADM

## 2023-03-08 RX ORDER — NALOXONE HYDROCHLORIDE 0.4 MG/ML
0.4 INJECTION, SOLUTION INTRAMUSCULAR; INTRAVENOUS; SUBCUTANEOUS
Status: DISCONTINUED | OUTPATIENT
Start: 2023-03-08 | End: 2023-03-09 | Stop reason: HOSPADM

## 2023-03-08 RX ORDER — DEXAMETHASONE SODIUM PHOSPHATE 4 MG/ML
INJECTION, SOLUTION INTRA-ARTICULAR; INTRALESIONAL; INTRAMUSCULAR; INTRAVENOUS; SOFT TISSUE PRN
Status: DISCONTINUED | OUTPATIENT
Start: 2023-03-08 | End: 2023-03-08

## 2023-03-08 RX ORDER — BUPIVACAINE HYDROCHLORIDE AND EPINEPHRINE 5; 5 MG/ML; UG/ML
INJECTION, SOLUTION PERINEURAL PRN
Status: DISCONTINUED | OUTPATIENT
Start: 2023-03-08 | End: 2023-03-08

## 2023-03-08 RX ORDER — GLYCOPYRROLATE 0.2 MG/ML
INJECTION, SOLUTION INTRAMUSCULAR; INTRAVENOUS PRN
Status: DISCONTINUED | OUTPATIENT
Start: 2023-03-08 | End: 2023-03-08

## 2023-03-08 RX ORDER — NEOSTIGMINE METHYLSULFATE 1 MG/ML
VIAL (ML) INJECTION PRN
Status: DISCONTINUED | OUTPATIENT
Start: 2023-03-08 | End: 2023-03-08

## 2023-03-08 RX ORDER — POTASSIUM CHLORIDE 20MEQ/15ML
5 LIQUID (ML) ORAL DAILY
Status: DISCONTINUED | OUTPATIENT
Start: 2023-03-08 | End: 2023-03-09 | Stop reason: HOSPADM

## 2023-03-08 RX ORDER — LIDOCAINE 40 MG/G
CREAM TOPICAL
Status: DISCONTINUED | OUTPATIENT
Start: 2023-03-08 | End: 2023-03-09 | Stop reason: HOSPADM

## 2023-03-08 RX ORDER — ASPIRIN 81 MG/1
81 TABLET ORAL 2 TIMES DAILY
Status: DISCONTINUED | OUTPATIENT
Start: 2023-03-08 | End: 2023-03-09 | Stop reason: HOSPADM

## 2023-03-08 RX ORDER — KETOROLAC TROMETHAMINE 15 MG/ML
15 INJECTION, SOLUTION INTRAMUSCULAR; INTRAVENOUS
Status: DISCONTINUED | OUTPATIENT
Start: 2023-03-08 | End: 2023-03-08 | Stop reason: HOSPADM

## 2023-03-08 RX ORDER — PROPOFOL 10 MG/ML
INJECTION, EMULSION INTRAVENOUS CONTINUOUS PRN
Status: DISCONTINUED | OUTPATIENT
Start: 2023-03-08 | End: 2023-03-08

## 2023-03-08 RX ORDER — ACETAMINOPHEN 325 MG/1
650 TABLET ORAL EVERY 4 HOURS PRN
Status: DISCONTINUED | OUTPATIENT
Start: 2023-03-11 | End: 2023-03-09 | Stop reason: HOSPADM

## 2023-03-08 RX ORDER — ACETAMINOPHEN 325 MG/1
650 TABLET ORAL EVERY 4 HOURS PRN
Qty: 100 TABLET | Refills: 0 | Status: SHIPPED | OUTPATIENT
Start: 2023-03-08 | End: 2023-03-08

## 2023-03-08 RX ORDER — POLYETHYLENE GLYCOL 3350 17 G/17G
17 POWDER, FOR SOLUTION ORAL DAILY
Status: DISCONTINUED | OUTPATIENT
Start: 2023-03-09 | End: 2023-03-09 | Stop reason: HOSPADM

## 2023-03-08 RX ORDER — CEFAZOLIN SODIUM/WATER 2 G/20 ML
2 SYRINGE (ML) INTRAVENOUS SEE ADMIN INSTRUCTIONS
Status: DISCONTINUED | OUTPATIENT
Start: 2023-03-08 | End: 2023-03-08 | Stop reason: HOSPADM

## 2023-03-08 RX ORDER — PROPOFOL 10 MG/ML
INJECTION, EMULSION INTRAVENOUS PRN
Status: DISCONTINUED | OUTPATIENT
Start: 2023-03-08 | End: 2023-03-08

## 2023-03-08 RX ORDER — DIPHENHYDRAMINE HCL 25 MG
25 CAPSULE ORAL
Status: DISCONTINUED | OUTPATIENT
Start: 2023-03-08 | End: 2023-03-09 | Stop reason: HOSPADM

## 2023-03-08 RX ORDER — PROCHLORPERAZINE MALEATE 5 MG
5 TABLET ORAL EVERY 6 HOURS PRN
Status: DISCONTINUED | OUTPATIENT
Start: 2023-03-08 | End: 2023-03-09 | Stop reason: HOSPADM

## 2023-03-08 RX ORDER — ASPIRIN 81 MG/1
81 TABLET ORAL 2 TIMES DAILY
Qty: 60 TABLET | Refills: 0 | Status: SHIPPED | OUTPATIENT
Start: 2023-03-08 | End: 2023-05-10

## 2023-03-08 RX ORDER — HYDROMORPHONE HYDROCHLORIDE 1 MG/ML
0.5 INJECTION, SOLUTION INTRAMUSCULAR; INTRAVENOUS; SUBCUTANEOUS EVERY 5 MIN PRN
Status: DISCONTINUED | OUTPATIENT
Start: 2023-03-08 | End: 2023-03-08 | Stop reason: HOSPADM

## 2023-03-08 RX ORDER — HYDROMORPHONE HCL IN WATER/PF 6 MG/30 ML
0.2 PATIENT CONTROLLED ANALGESIA SYRINGE INTRAVENOUS EVERY 5 MIN PRN
Status: DISCONTINUED | OUTPATIENT
Start: 2023-03-08 | End: 2023-03-08 | Stop reason: HOSPADM

## 2023-03-08 RX ORDER — MULTIPLE VITAMINS W/ MINERALS TAB 9MG-400MCG
1 TAB ORAL EVERY MORNING
Status: DISCONTINUED | OUTPATIENT
Start: 2023-03-09 | End: 2023-03-09 | Stop reason: HOSPADM

## 2023-03-08 RX ORDER — HYDROMORPHONE HCL IN WATER/PF 6 MG/30 ML
0.1 PATIENT CONTROLLED ANALGESIA SYRINGE INTRAVENOUS
Status: DISCONTINUED | OUTPATIENT
Start: 2023-03-08 | End: 2023-03-09 | Stop reason: HOSPADM

## 2023-03-08 RX ADMIN — LIDOCAINE HYDROCHLORIDE 4 ML: 40 SOLUTION TOPICAL at 11:17

## 2023-03-08 RX ADMIN — PHENYLEPHRINE HYDROCHLORIDE 100 MCG: 10 INJECTION INTRAVENOUS at 11:15

## 2023-03-08 RX ADMIN — CHLORTHALIDONE 12.5 MG: 25 TABLET ORAL at 18:40

## 2023-03-08 RX ADMIN — HYDROCODONE BITARTRATE AND ACETAMINOPHEN 1 TABLET: 5; 325 TABLET ORAL at 13:13

## 2023-03-08 RX ADMIN — TRANEXAMIC ACID 1950 MG: 650 TABLET ORAL at 09:41

## 2023-03-08 RX ADMIN — POTASSIUM CHLORIDE 5 MEQ: 20 SOLUTION ORAL at 22:17

## 2023-03-08 RX ADMIN — DEXAMETHASONE SODIUM PHOSPHATE 8 MG: 4 INJECTION, SOLUTION INTRA-ARTICULAR; INTRALESIONAL; INTRAMUSCULAR; INTRAVENOUS; SOFT TISSUE at 11:14

## 2023-03-08 RX ADMIN — SODIUM CHLORIDE, POTASSIUM CHLORIDE, SODIUM LACTATE AND CALCIUM CHLORIDE: 600; 310; 30; 20 INJECTION, SOLUTION INTRAVENOUS at 18:36

## 2023-03-08 RX ADMIN — KETOROLAC TROMETHAMINE 15 MG: 30 INJECTION, SOLUTION INTRAMUSCULAR at 11:14

## 2023-03-08 RX ADMIN — MAGNESIUM 64 MG (MAGNESIUM CHLORIDE) TABLET,DELAYED RELEASE 535 MG: at 22:16

## 2023-03-08 RX ADMIN — FENTANYL CITRATE 100 MCG: 50 INJECTION, SOLUTION INTRAMUSCULAR; INTRAVENOUS at 11:14

## 2023-03-08 RX ADMIN — PHENYLEPHRINE HYDROCHLORIDE 0.5 MCG/KG/MIN: 10 INJECTION INTRAVENOUS at 11:17

## 2023-03-08 RX ADMIN — BUPIVACAINE HYDROCHLORIDE AND EPINEPHRINE BITARTRATE 20 ML: 5; .005 INJECTION, SOLUTION PERINEURAL at 10:59

## 2023-03-08 RX ADMIN — ROCURONIUM BROMIDE 50 MG: 50 INJECTION, SOLUTION INTRAVENOUS at 11:14

## 2023-03-08 RX ADMIN — NEOSTIGMINE METHYLSULFATE 4 MG: 1 INJECTION, SOLUTION INTRAVENOUS at 12:31

## 2023-03-08 RX ADMIN — CEFAZOLIN 1 G: 1 INJECTION, POWDER, FOR SOLUTION INTRAMUSCULAR; INTRAVENOUS at 18:36

## 2023-03-08 RX ADMIN — ONDANSETRON 4 MG: 2 INJECTION INTRAMUSCULAR; INTRAVENOUS at 11:14

## 2023-03-08 RX ADMIN — PROPOFOL 50 MCG/KG/MIN: 10 INJECTION, EMULSION INTRAVENOUS at 11:17

## 2023-03-08 RX ADMIN — Medication 2 G: at 11:24

## 2023-03-08 RX ADMIN — SODIUM CHLORIDE, POTASSIUM CHLORIDE, SODIUM LACTATE AND CALCIUM CHLORIDE: 600; 310; 30; 20 INJECTION, SOLUTION INTRAVENOUS at 11:42

## 2023-03-08 RX ADMIN — PROPOFOL 120 MG: 10 INJECTION, EMULSION INTRAVENOUS at 11:14

## 2023-03-08 RX ADMIN — SENNOSIDES AND DOCUSATE SODIUM 1 TABLET: 50; 8.6 TABLET ORAL at 20:05

## 2023-03-08 RX ADMIN — SODIUM CHLORIDE, POTASSIUM CHLORIDE, SODIUM LACTATE AND CALCIUM CHLORIDE: 600; 310; 30; 20 INJECTION, SOLUTION INTRAVENOUS at 09:53

## 2023-03-08 RX ADMIN — ASPIRIN 81 MG: 81 TABLET, COATED ORAL at 20:06

## 2023-03-08 RX ADMIN — ACETAMINOPHEN 975 MG: 325 TABLET ORAL at 18:40

## 2023-03-08 RX ADMIN — MIDAZOLAM 1 MG: 1 INJECTION INTRAMUSCULAR; INTRAVENOUS at 11:07

## 2023-03-08 RX ADMIN — GLYCOPYRROLATE 0.6 MG: 0.2 INJECTION, SOLUTION INTRAMUSCULAR; INTRAVENOUS at 12:31

## 2023-03-08 ASSESSMENT — ACTIVITIES OF DAILY LIVING (ADL)
ADLS_ACUITY_SCORE: 20

## 2023-03-08 NOTE — ANESTHESIA PROCEDURE NOTES
Brachial plexus Procedure Note    Pre-Procedure   Staff -        Anesthesiologist:  Antwon Bridges MD       Performed By: anesthesiologist       Referred By: marifer       Location: pre-op       Pre-Anesthestic Checklist: patient identified, IV checked, site marked, risks and benefits discussed, informed consent, monitors and equipment checked, pre-op evaluation, at physician/surgeon's request and post-op pain management  Timeout:       Correct Patient: Yes        Correct Procedure: Yes        Correct Site: Yes        Correct Position: Yes        Correct Laterality: Yes        Site Marked: Yes  Procedure Documentation  Procedure: Brachial plexus       Diagnosis: ARTHRITIS       Laterality: right       Patient Position: sitting       Patient Prep/Sterile Barriers: sterile gloves, mask       Skin prep: Chloraprep       Local skin infiltrated with 3 mL of 2% lidocaine.  (interscalene approach).       Needle Type: insulated and short bevel       Needle Gauge: 21.        Needle Length (Inches): 4        Ultrasound guided       1. Ultrasound was used to identify targeted nerve, plexus, vascular marker, or fascial plane and place a needle adjacent to it in real-time.       2. Ultrasound was used to visualize the spread of anesthetic in close proximity to the above referenced structure.       4. The visualized anatomic structures appeared normal.       5. There were no apparent abnormal pathologic findings.    Assessment/Narrative         The placement was negative for: blood aspirated, painful injection and site bleeding       Paresthesias: No.       Bolus given via needle. no blood aspirated via catheter.        Secured via.        Insertion/Infusion Method: Single Shot       Complications: none       Injection made incrementally with aspirations every 5 mL.     Comments:  Good LA spread around BP at interscalene groove      FOR [facility] ONLY:   Pain Team Contact information: please page the Pain Team  "Via [employer]. Search \"Pain\". During daytime hours, please page the attending first. At night please page the resident first.    "

## 2023-03-08 NOTE — ANESTHESIA PREPROCEDURE EVALUATION
Anesthesia Pre-Procedure Evaluation    Patient: Aaliyha [place]   MRN: [id number] : 1950        Procedure : Procedure(s):  Right reverse total shoulder arthroplasty (general and interscalene block)          Past Medical History:   Diagnosis Date     Arthritis      Atypical squamous cells cannot exclude high grade squamous intraepithelial lesion on cytologic smear of cervix (ASC-H) 2017 ASC-H, Neg HPV, Endometrial cells present. [place]= SELENE 1, EMB= Benign     Facial cellulitis 2015     HTN (hypertension)      Perforated sigmoid colon (H) 2017     Sleep apnea       Past Surgical History:   Procedure Laterality Date     APPENDECTOMY       COLECTOMY LEFT N/A 2017    Procedure: COLECTOMY LEFT;  Surgeon: David Gaitan MD;  Location:  OR     COLONOSCOPY N/A 2017    Procedure: COLONOSCOPY;  Surgeon: Regis Mckoy MD;  Location:  GI     LAPAROSCOPIC ASSISTED SIGMOID COLECTOMY N/A 2017    Procedure: LAPAROSCOPIC ASSISTED SIGMOID COLECTOMY;  Surgeon: David Gaitan MD;  Location: [facility] OR     ORTHOPEDIC SURGERY       REVERSE ARTHROPLASTY SHOULDER Left 2018    Procedure: Left reverse total shoulder arthroplasty;  Surgeon: Cricket Shah MD;  Location:  OR     SURGICAL HISTORY OF -   2010    Rotator cuff repair-right shoulder     SURGICAL HISTORY OF -   2011    Right hip replacement     SURGICAL HISTORY OF -       ACL and MCL left knee      Allergies   Allergen Reactions     Blood Transfusion Related (Informational Only) Other (See Comments)     Patient has a history of a clinically significant antibody against RBC antigens.  A delay in compatible RBCs may occur.     Ciprofloxacin      Facial swelling, tingling on her upper lip, throat slightly closed, racing heart     Oxycodone      Nightmares, heart palpations     Sulfa Drugs      Eye reaction to sulfa-based eye drop     Dilaudid [Hydromorphone] Nausea and Vomiting      Social History      Tobacco Use     Smoking status: Never     Smokeless tobacco: Never   Substance Use Topics     Alcohol use: Yes     Alcohol/week: 0.0 standard drinks     Comment: 1 glass of wine per night      Wt Readings from Last 1 Encounters:   03/08/23 62.6 kg (138 lb)        Anesthesia Evaluation   Pt has had prior anesthetic.         ROS/MED HX  ENT/Pulmonary:     (+) sleep apnea,     Neurologic:  - neg neurologic ROS     Cardiovascular:     (+) hypertension----- (-) taking anticoagulants/antiplatelets   METS/Exercise Tolerance:     Hematologic:       Musculoskeletal:   (+) arthritis,     GI/Hepatic:  - neg GI/hepatic ROS     Renal/Genitourinary:     (+) renal disease, type: CRI, Pt does not require dialysis,     Endo:  - neg endo ROS     Psychiatric/Substance Use:  - neg psychiatric ROS     Infectious Disease:  - neg infectious disease ROS     Malignancy:       Other:            Physical Exam    Airway        Mallampati: II   TM distance: > 3 FB   Neck ROM: full   Mouth opening: > 3 cm    Respiratory Devices and Support         Dental       (+) Minor Abnormalities - some fillings, tiny chips      Cardiovascular          Rhythm and rate: regular and normal     Pulmonary           breath sounds clear to auscultation           OUTSIDE LABS:  CBC:   Lab Results   Component Value Date    WBC 9.2 12/03/2021    WBC 9.8 10/11/2018    HGB 11.8 02/20/2023    HGB 12.1 12/03/2021    HCT 36.0 12/03/2021    HCT 41.2 10/11/2018     12/03/2021     11/20/2018     BMP:   Lab Results   Component Value Date     02/20/2023     10/03/2022    POTASSIUM 3.9 02/20/2023    POTASSIUM 4.7 10/03/2022    CHLORIDE 101 02/20/2023    CHLORIDE 106 10/03/2022    CO2 23 02/20/2023    CO2 28 10/03/2022    BUN 26.3 (H) 02/20/2023    BUN 26 10/03/2022    CR 1.43 (H) 02/20/2023    CR 1.22 (H) 10/03/2022     (H) 02/20/2023     (H) 10/03/2022     COAGS: No results found for: PTT, INR, FIBR  POC: No results found for: BGM,  HCG, HCGS  HEPATIC:   Lab Results   Component Value Date    ALBUMIN 3.9 10/03/2022    PROTTOTAL 7.5 10/03/2022    ALT 24 10/03/2022    AST 25 10/03/2022    ALKPHOS 59 10/03/2022    BILITOTAL 0.4 10/03/2022     OTHER:   Lab Results   Component Value Date    LACT 1.1 11/22/2015    A1C 4.9 10/03/2022    DARIUS 10.4 (H) 02/20/2023    MAG 2.5 (H) 11/23/2015    TSH 1.20 06/23/2018    T4 0.92 06/23/2018    CRP 13.0 (H) 11/11/2016    SED 12 11/11/2016       Anesthesia Plan    ASA Status:  2   NPO Status:  NPO Appropriate    Anesthesia Type: General.     - Airway: ETT   Induction: Intravenous.   Maintenance: Balanced.   Techniques and Equipment:       - Drips/Meds: Phenylephrine     Consents    Anesthesia Plan(s) and associated risks, benefits, and realistic alternatives discussed. Questions answered and patient/representative(s) expressed understanding.    - Discussed:     - Discussed with:  Patient      - Extended Intubation/Ventilatory Support Discussed: No.      - Patient is DNR/DNI Status: No    Use of blood products discussed: No .     Postoperative Care    Pain management: IV analgesics, Multi-modal analgesia, Oral pain medications, Peripheral nerve block (Single Shot).   PONV prophylaxis: Ondansetron (or other 5HT-3), Dexamethasone or Solumedrol     Comments:    Other Comments: The surgeon has given a verbal order transferring care of this patient to me for the performance of a regional analgesia block for post-op pain control. It is requested of me because I am uniquely trained and qualified to perform this block and the surgeon is neither trained nor qualified to perform this procedure.    Ultrasound guided peripheral nerve block(s) discussed. The patient was informed that undergoing the block is not required for surgery to proceed and is optional, but they can be beneficial in reducing post operative pain medication requirements for a period of time (several hours to a few days). Block rationale, procedure, expected  results, and block specific side effects reviewed with the patient. Risks, including but not limited to bleeding, infection, nerve damage/damage to surrounding structures, medication adverse/allergic reactions, and block failure discussed.  Questions encouraged and answered.  The patient wishes to proceed.               Antwon Bridges MD

## 2023-03-08 NOTE — ANESTHESIA PROCEDURE NOTES
Airway       Patient location during procedure: OR       Procedure Start/Stop Times: 3/8/2023 11:17 AM  Staff -        Anesthesiologist:  Antwon Bridges MD       CRNA: Kleber Levin APRN CRNA       Performed By: CRNA  Consent for Airway        Urgency: elective  Indications and Patient Condition       Indications for airway management: an-procedural       Induction type:intravenous       Mask difficulty assessment: 1 - vent by mask    Final Airway Details       Final airway type: endotracheal airway       Successful airway: ETT - single  Endotracheal Airway Details        ETT size (mm): 7.0       Cuffed: yes       Successful intubation technique: video laryngoscopy       VL Blade Size: Glidescope 3       Grade View of Cords: 1       Adjucts: stylet       Position: Left       Measured from: lips       Secured at (cm): 22       Bite block used: Oral Airway    Post intubation assessment        Placement verified by: capnometry, equal breath sounds and chest rise        Number of attempts at approach: 1       Number of other approaches attempted: 0       Secured with: plastic tape       Ease of procedure: easy       Dentition: Intact and Unchanged    Medication(s) Administered   Medication Administration Time: 3/8/2023 11:17 AM

## 2023-03-08 NOTE — BRIEF OP NOTE
[facility]    Brief Operative Note    Pre-operative diagnosis: Rotator cuff tear [M75.100]  Post-operative diagnosis Same as pre-operative diagnosis    Procedure: Procedure(s):  Right reverse total shoulder arthroplasty (general and interscalene block)  Surgeon: Surgeon(s) and Role:     * Cricket Shah MD - Primary     * Milla Godfrey PA-C - Assisting  Anesthesia: General with Block   Drains: None  Specimens: * No specimens in log *  Findings:   None.  Complications: None.  Implants:   Implant Name Type Inv. Item Serial No.  Lot No. LRB No. Used Action   BASEPLATE STD 25MM - [id number] Total Joint Component/Insert BASEPLATE STD 25MM [id number] [employer]  Right 1 Implanted   SCREW CENTRAL 6.5X30MM [id number] - [id number] Metallic Hardware/[place] SCREW CENTRAL 6.5X30MM [id number]  [employer] [id number] Right 1 Implanted   SCREW PERIPHERAL 5.0X34MM [id number] - [id number] Metallic Hardware/[place] SCREW PERIPHERAL 5.0X34MM [id number]  [employer] [id number] Right 1 Implanted   SCREW PERIPHERAL 38MM - [id number] Metallic Hardware/[place] SCREW PERIPHERAL 38MM  [employer] [id number] Right 1 Implanted   GLEOSPHERE LATERALIZED AP REVERSED 36MM - [id number] Total Joint Component/Insert GLEOSPHERE LATERALIZED AP REVERSED 36MM [id number] [employer]  Right 1 Implanted   STEM HUMERAL ANATOMIC STD PTC 4B - [id number] Total Joint Component/Insert STEM HUMERAL ANATOMIC STD PTC 4B [id number] [employer]  Right 1 Implanted   TRAY REVERSE HIGH OFFSET +0 [id number] - [id number] Total Joint Component/Insert TRAY REVERSE HIGH OFFSET +0 [id number] [employer]  Right 1 Implanted   INSERT REVISION REVERSE +6/12 36MM - [id number] Total Joint Component/Insert INSERT REVISION REVERSE +6/12 36MM [id number] [employer]  Right 1 Implanted     Plan:  NWB to RUE  Ultrasling x 3 weeks  DVT prophylaxis - SCDs & ASA EC 81 mg PO BID x 4 weeks   Ancef x 24 hours  PACU XRs  PT/OT - Codman's and pendulum  swing exercises. Okay to work on PROM; FF 0 - 90, ER 0 - 30.   Keep dressing C/D/I for 1 week; okay to shower    Follow up with Carline Godfrey PA-C in clinic in 2 weeks.

## 2023-03-08 NOTE — ANESTHESIA POSTPROCEDURE EVALUATION
Patient: Aaliyah Hayes    Procedure: Procedure(s):  Right reverse total shoulder arthroplasty (general and interscalene block)       Anesthesia Type:  General    Note:  Disposition: Admission   Postop Pain Control: Uneventful            Sign Out: Well controlled pain   PONV: No   Neuro/Psych: Uneventful            Sign Out: Acceptable/Baseline neuro status   Airway/Respiratory: Uneventful            Sign Out: Acceptable/Baseline resp. status   CV/Hemodynamics: Uneventful            Sign Out: Acceptable CV status   Other NRE: NONE   DID A NON-ROUTINE EVENT OCCUR? No           Last vitals:  Vitals Value Taken Time   /52 03/08/23 1600   Temp 98.7  F (37.1  C) 03/08/23 1500   Pulse 65 03/08/23 1600   Resp 21 03/08/23 1413   SpO2 92 % 03/08/23 1607   Vitals shown include unvalidated device data.    Electronically Signed By: Antwon Bridges MD  March 8, 2023  4:08 PM

## 2023-03-08 NOTE — ANESTHESIA CARE TRANSFER NOTE
Patient: Aaliyah Hayes    Procedure: Procedure(s):  Right reverse total shoulder arthroplasty (general and interscalene block)       Diagnosis: Rotator cuff tear [M75.100]  Diagnosis Additional Information: No value filed.    Anesthesia Type:   General     Note:    Oropharynx: spontaneously breathing  Level of Consciousness: awake  Oxygen Supplementation: face mask  Level of Supplemental Oxygen (L/min / FiO2): 6l  Independent Airway: airway patency satisfactory and stable  Dentition: dentition unchanged  Vital Signs Stable: post-procedure vital signs reviewed and stable  Report to RN Given: handoff report given  Patient transferred to: PACU  Comments: Pt to PACU, VSS, report to RN  Handoff Report: Identifed the Patient, Identified the Reponsible Provider, Reviewed the pertinent medical history, Discussed the surgical course, Reviewed Intra-OP anesthesia mangement and issues during anesthesia, Set expectations for post-procedure period and Allowed opportunity for questions and acknowledgement of understanding      Vitals:  Vitals Value Taken Time   /81 03/08/23 1250   Temp     Pulse 93 03/08/23 1253   Resp 15 03/08/23 1253   SpO2 96 % 03/08/23 1253   Vitals shown include unvalidated device data.    Electronically Signed By: PANKAJ Perez CRNA  March 8, 2023  12:54 PM

## 2023-03-09 ENCOUNTER — APPOINTMENT (OUTPATIENT)
Dept: OCCUPATIONAL THERAPY | Facility: CLINIC | Age: 73
End: 2023-03-09
Attending: ORTHOPAEDIC SURGERY
Payer: MEDICARE

## 2023-03-09 VITALS
RESPIRATION RATE: 18 BRPM | WEIGHT: 138 LBS | SYSTOLIC BLOOD PRESSURE: 127 MMHG | DIASTOLIC BLOOD PRESSURE: 59 MMHG | HEART RATE: 71 BPM | HEIGHT: 60 IN | OXYGEN SATURATION: 95 % | TEMPERATURE: 98.7 F | BODY MASS INDEX: 27.09 KG/M2

## 2023-03-09 LAB
FASTING STATUS PATIENT QL REPORTED: NORMAL
GLUCOSE SERPL-MCNC: 90 MG/DL (ref 70–99)
HGB BLD-MCNC: 8.1 G/DL (ref 11.7–15.7)

## 2023-03-09 PROCEDURE — 82947 ASSAY GLUCOSE BLOOD QUANT: CPT | Mod: GZ | Performed by: ORTHOPAEDIC SURGERY

## 2023-03-09 PROCEDURE — 97110 THERAPEUTIC EXERCISES: CPT | Mod: GO

## 2023-03-09 PROCEDURE — 97165 OT EVAL LOW COMPLEX 30 MIN: CPT | Mod: GO

## 2023-03-09 PROCEDURE — 258N000003 HC RX IP 258 OP 636: Performed by: PHYSICIAN ASSISTANT

## 2023-03-09 PROCEDURE — 97535 SELF CARE MNGMENT TRAINING: CPT | Mod: GO

## 2023-03-09 PROCEDURE — 85018 HEMOGLOBIN: CPT | Performed by: PHYSICIAN ASSISTANT

## 2023-03-09 PROCEDURE — 36415 COLL VENOUS BLD VENIPUNCTURE: CPT | Performed by: PHYSICIAN ASSISTANT

## 2023-03-09 PROCEDURE — 250N000011 HC RX IP 250 OP 636: Performed by: PHYSICIAN ASSISTANT

## 2023-03-09 PROCEDURE — 250N000013 HC RX MED GY IP 250 OP 250 PS 637: Performed by: PHYSICIAN ASSISTANT

## 2023-03-09 RX ADMIN — HYDROCODONE BITARTRATE AND ACETAMINOPHEN 2 TABLET: 5; 325 TABLET ORAL at 09:04

## 2023-03-09 RX ADMIN — ASPIRIN 81 MG: 81 TABLET, COATED ORAL at 09:04

## 2023-03-09 RX ADMIN — SODIUM CHLORIDE, POTASSIUM CHLORIDE, SODIUM LACTATE AND CALCIUM CHLORIDE: 600; 310; 30; 20 INJECTION, SOLUTION INTRAVENOUS at 04:37

## 2023-03-09 RX ADMIN — MULTIPLE VITAMINS W/ MINERALS TAB 1 TABLET: TAB at 09:04

## 2023-03-09 RX ADMIN — ACETAMINOPHEN 975 MG: 325 TABLET ORAL at 01:30

## 2023-03-09 RX ADMIN — CEFAZOLIN 1 G: 1 INJECTION, POWDER, FOR SOLUTION INTRAMUSCULAR; INTRAVENOUS at 01:30

## 2023-03-09 ASSESSMENT — ACTIVITIES OF DAILY LIVING (ADL)
ADLS_ACUITY_SCORE: 20
PREVIOUS_RESPONSIBILITIES: MEAL PREP;HOUSEKEEPING;LAUNDRY;SHOPPING;DRIVING
ADLS_ACUITY_SCORE: 20

## 2023-03-09 NOTE — PROGRESS NOTES
CM received call from Damien [phone] from [facility] requesting orders for home PT as this was arranged prior to pt's surgery. Reviewed ordered, no HC order at this time. Called and spoke with provider Milla Godfrey PA-C to discuss, she reports that she gave verbal order to [facility] yesterday for home PT, pt already has a home PT appointment scheduled with Layla for tomorrow 3/10. Called [facility] and updated.     Medina Hardin RN BSN   Inpatient Care Coordination  [facility]   Phone [phone]

## 2023-03-09 NOTE — PLAN OF CARE
Goal Outcome Evaluation:    Vital Signs: A&O x3. VSS. Afebrile. SpO2 > 93% on RA  Pain/Comfort: pain 4-5. Given scheduled tylenol and using ice.  Assessment: LS clear. CMS intact. Incision on R shoulder c/d/I. LR infusing @ 100ml/hr.  Diet: Tolerating diet and fluids  Output: adequate output this shift  Activity/Ambulation: ambulating to bathroom SBA  Plan: Continue with pain management. Continue with IV abx. Plan is to discharge today.

## 2023-03-09 NOTE — PLAN OF CARE
Goal Outcome Evaluation:      Plan of Care Reviewed With: patient    Overall Patient Progress: improvingOverall Patient Progress: improving       VSS.  BP returned to baseline. Tingling sensation returning to fingers.  Pt wanted to take Norco q4h prior to block wearing off; tolerated that plan well.  Passed PT/OT assessment today.  Up independently in room following exercises.  Denied nausea.  Tolerated diet.  Discharged home to self care; discharge instructions given; all questions answered.  Aware of follow up recommendations.

## 2023-03-09 NOTE — PROGRESS NOTES
Met with patient to discuss CPAP at night.  Patient states she does not wear CPAP but has an oral device.    Mary Bridges, RT

## 2023-03-09 NOTE — PROGRESS NOTES
03/09/23 1046   Appointment Info   Signing Clinician's Name / Credentials (OT) Jenise Bridges OTR/L   Living Environment   People in Home alone   Current Living Arrangements house   Home Accessibility stairs within home   Number of Stairs, Within Home, Primary greater than 10 stairs   Stair Railings, Within Home, Primary railings on both sides of stairs   Living Environment Comments Reports she will have intermittent assist at discharge   Self-Care   Usual Activity Tolerance excellent   Current Activity Tolerance good   Regular Exercise Yes   Equipment Currently Used at Home   (Step in shower. [place] shower mat. Reacher. Sock aid. Standard height toilet. Will sleep in recliner)   Fall history within last six months no   Activity/Exercise/Self-Care Comment At baseline is independent in self-cares without gait aid   Instrumental Activities of Daily Living (IADL)   Previous Responsibilities meal prep;housekeeping;laundry;shopping;driving   General Information   Onset of Illness/Injury or Date of Surgery 03/08/23   Referring Physician Milla Godfrey PA-C   Patient/Family Therapy Goal Statement (OT) Return home today   Additional Occupational Profile Info/Pertinent History of Current Problem POD1 Right reverse total shoulder arthroplasty (general and interscalene block). See EMR for PMH   Existing Precautions/Restrictions shoulder  (Sling with pillow. Codmans OK. Per orders, AROM 130 degrees FF and 30 degrees ER okay)   Cognitive Status Examination   Affect/Mental Status (Cognitive) WFL   Follows Commands follows multi-step commands   Sensory   Sensory Comments Reports baseline sensory impairments in hand due to cervical stenosis   Pain Assessment   Patient Currently in Pain Yes, see Vital Sign flowsheet   Strength Comprehensive (MMT)   Comment, General Manual Muscle Testing (MMT) Assessment R hand dominant   Transfers   Transfers sit-stand transfer;toilet transfer   Transfer Comments ambulated 100 ft in  Unknown gender. Candace [place] is well. No S/S of PTL. jackson independently   Sit-Stand Transfer   Sit-Stand [place] (Transfers) independent   Toilet Transfer   [place] Level (Toilet Transfer) independent   Activities of Daily Living   BADL Assessment/Intervention toileting;lower body dressing;upper body dressing   Upper Body Dressing Assessment/Training   [place] Level (Upper Body Dressing) moderate assist (50% patient effort)   Lower Body Dressing Assessment/Training   [place] Level (Lower Body Dressing) moderate assist (50% patient effort)   Toileting   [place] Level (Toileting) minimum assist (75% patient effort)   Clinical Impression   Criteria for Skilled Therapeutic Interventions Met (OT) Yes, treatment indicated   OT Diagnosis Decline function   OT Problem List-Impairments impacting ADL activity tolerance impaired;balance;pain;post-surgical precautions   Assessment of Occupational Performance 3-5 Performance Deficits   Identified Performance Deficits dressing, toileting, IADLs   Planned Therapy Interventions (OT) ADL retraining;home program guidelines;progressive activity/exercise   Clinical Decision Making Complexity (OT) low complexity   Anticipated Equipment Needs Upon Discharge (OT)   (Pt has)   Risk & Benefits of therapy have been explained evaluation/treatment results reviewed;care plan/treatment goals reviewed;risks/benefits reviewed;current/potential barriers reviewed;participants voiced agreement with care plan;participants included;patient   OT Total Evaluation Time   OT Eval, Low Complexity Minutes (33295) 13   OT Goals   Therapy Frequency (OT) One time eval and treatment   OT Goals Upper Body Dressing;Lower Body Dressing;Toilet Transfer/Toileting;OT Goal 1;OT Goal 2   OT: Upper Body Dressing Supervision/stand-by assist   OT: Lower Body Dressing Supervision/stand-by assist   OT: Toilet Transfer/Toileting Modified independent;cleaning and garment management   OT: Goal 1 Pt will verbalize understanding of pain management  strategies, how to progress activity tolerance, fall prevention, vehicle transfer technique, step in shower transfer technique   OT: Goal 2 Pt will be indepenent in RUE HEP per protocol to maintain joint integrity in anticipation of future RUE functional use during ADLs   Interventions   Interventions Quick Adds Self-Care/Home Management;Therapeutic Procedures/Exercise   Self-Care/Home Management   Self-Care/Home Mgmt/ADL, Compensatory, Meal Prep Minutes (80304) 15   Symptoms Noted During/After Treatment (Meal Preparation/Planning Training) increased pain   Treatment Detail/Skilled Intervention Pt participated in education regarding fall prevention strategies, how to progress activity tolerance, vehicle transfer technique, step in shower transfer technique, pain management strategies. Pt doffed/donned UltraSling with SBA and cues for technique. Pt donned pants and slip on shoes with SBA and cues for technique. Educated in possible use of tongs to pull up pants on R side. Pt verbalized understranding of all education and denied questions or concerns regarding discharge home. /59.   Therapeutic Procedures/Exercise   Therapeutic Procedure: strength, endurance, ROM, flexibillity minutes (00666) 13   Symptoms Noted During/After Treatment increased pain   Treatment Detail/Skilled Intervention Upon arrival pt very agreeable to therapy. Pt engaged in approximately 10-15 reps of the following LUE exercises per protocol: gross grasp/release using resistive ball, wrist flexion/extension, elbow flexion/extension, Codmans forward/back, side to side, clockwise, counterclockwise; AROM ER to 30 degrees, AROM FF to 50 degrees. Pt unable to tolerate FF to 130 degrees today. Pt required intermittent cues for pacing and technique. Pt educated in intensity, frequency, duration and was issued handout   OT Discharge Planning   OT Rationale for DC Rec Defer to ortho. Anticipate at discharge pt will be modified independent in  dressing, toileting, bathing, functional mobility. Recommend pt have assist with strenuous IADLs (cooking, cleaning, laundry, etc). Recommend daily check-ins. Pt reports she can have this level of assist at discharge. Pt has needed self-care adaptive equipment   Total Session Time   Timed Code Treatment Minutes 28   Total Session Time (sum of timed and untimed services) 41

## 2023-03-09 NOTE — OP NOTE
Procedure Date: 03/08/2023    PREOPERATIVE DIAGNOSIS:  Right cuff tear arthropathy.    POSTOPERATIVE DIAGNOSIS:  Right cuff tear arthropathy.    PROCEDURE:  Right reverse total shoulder arthroplasty.    SURGEON:  Cricket Shah MD    FIRST ASSISTANT:  NURIS Canales.  A skilled first assistant was necessary for patient positioning, prepping and draping, all soft tissue retraction, help with arm positioning, help with reduction maneuvers, closing and patient transfer.    ANESTHESIA:  General and block.    COMPLICATIONS:  None apparent.    ESTIMATED BLOOD LOSS:  100 mL    INDICATIONS FOR PROCEDURE:  The patient is a 73-year-old female who previously underwent a right shoulder rotator cuff repair that has failed.  She had a recurrent large rotator cuff tear.  After discussion of treatment options with the patient and after failure of conservative measures, we decided the best way to move forward would be a reverse total shoulder arthroplasty.  She agreed to proceed.    DESCRIPTION OF PROCEDURE:  On the day of the procedure, the patient was met in the preoperative area by the Surgery and Anesthesia team.  The right shoulder was marked by the operative surgeon.  Informed consent was obtained.  Risks and benefits of the surgery were discussed with the patient including risk of bleeding, infection, damage to neurovascular structures, stiffness, continued pain and need for future revision surgery.  She understood and agreed to proceed.    Our Anesthesia colleagues then performed a block.  She was then brought to the operating room and general anesthesia was administered.  She was placed into the beach chair position.  The right shoulder was prepped and draped in the usual sterile fashion.  Preoperative antibiotics given.  A preoperative timeout was performed.  We began the procedure by making a standard deltopectoral incision.  Sharp dissection was carried down through the skin and subcutaneous tissue.  The cephalic  vein was visualized and mobilized laterally and the subacromial subdeltoid adhesions were mobilized as well.  The 3 sisters were coagulated and the biceps tendon was tenodesed to the superior aspect of the pectoralis major tendon.  Clavipectoral fascia was excised.  However, the patient did have a significantly torn supraspinatus, subscapularis.  Only the inferior portion of the subscap was intact.  The supraspinatus and infraspinatus were completely torn as well and the remnant sutures were still in place and well visible.  We released the remnant of the rotator cuff of the subscapularis and released that all the way to the 7 o'clock position on the humeral head.  We did tag it for a potential subscap repair later on and we also performed a 4-sided subscapularis release to try and mobilize it.  We then turned our attention to the proximal humerus.  There was some arthritis in the area as well as some bone spur formation.  An anatomic neck cut was made using freehand technique.  The deep anchors that were metal anchors from the shoulder were removed along with the sutures without any major difficulty.  We then sounded and broached the shoulder in the patient's native version, which was about 30 degrees, and worked our way up to a size 4B stem.  We then turned our attention to the proximal humerus.  The labrum was removed circumferentially and the capsule was circumferentially incised as well to mobilize the shoulder.  Excess cartilage was removed and then we used a size 25, ten-degree superior tilted guide in order to place the pin into the inferior center of the glenoid.  We over-reamed and overdrilled and a size 25 standard baseplate was then screwed into position with 2 peripheral locking screws for rotational stability.  A size 36 lateralized glenosphere was then impacted into position and we turned our attention back to the proximal humerus.  We noted that a high offset tray would have the appropriate fit.  The  trial implants were then removed and the 3 drill holes were placed about the lesser tuberosity for potential subscapularis repair and #2 FiberWires were placed through them.  True implants were then assembled on the back table and impacted into position.  The wound was copiously irrigated and then the shoulder was reduced. After reduction, we noted excellent range of motion and no signs of impingement or instability.  We did note that the subscap was quite tight and if we attempted to repair the subscap, it would restrict her motion significantly and tighten up her shoulder.  Therefore, we elected to leave the subscap alone and not fix it.  Those excess sutures were removed.  The wound was then copiously irrigated with a final irrigation.  We then turned our attention to final closure.  The deltopectoral interval was loosely reapproximated using Vicryl followed by layered closure of the skin.  Sterile dressings were applied.  The patient was then awakened from anesthesia, placed into an UltraSling and brought to the PACU for recovery.  Postoperatively, she will spend the night in the hospital.  She will remain in the sling for a total of 3 weeks and will follow up with us in about 2 weeks.  Full recovery expected at 3 months.    Cricket Shah MD        D: 2023   T: 2023   MT: [id number]    Name:     LILI LUTZFrancine  MRN:      [id number]        Account:        [id number]   :      1950           Procedure Date: 2023     Document: [id number]

## 2023-03-09 NOTE — PLAN OF CARE
Vitals are Temp: 98.1  F (36.7  C) Temp src: Oral BP: 127/67 Pulse: 75   Resp: 16 SpO2: 96 %.    Pt A&Ox4. Stable on RA. Reportins stiff neck with pain 3/10, given scheduled tylenol. IVF at 100.  Received IV abx x1. Plan for discharge in AM

## 2023-03-09 NOTE — PROGRESS NOTES
Orthopedic Surgery  Aaliyah Hayes  3/9/2023  Admit Date:  3/8/2023  POD: 1 Day Post-Op  Procedure(s):  Right reverse total shoulder arthroplasty (general and interscalene block)    Patient resting comfortably in bed.    Patient states she has no pain currently.   Tolerating oral intake.    Denies nausea or vomiting.  Denies chest pain or shortness of breath.  No events overnight.      Alert and orient to person, place, and time.  Vital Sign Ranges  Temperature Temp  Av.2  F (36.8  C)  Min: 97.7  F (36.5  C)  Max: 98.7  F (37.1  C)   Blood pressure Systolic (24hrs), Av , Min:90 , Max:128        Diastolic (24hrs), Av, Min:50, Max:81      Pulse Pulse  Av.3  Min: 61  Max: 92   Respirations Resp  Av.1  Min: 12  Max: 34   Pulse oximetry SpO2  Av.4 %  Min: 93 %  Max: 100 %       Sling in place  Aquacel C/D/I to right shoulder  Sensation intact in upper arm over biceps as well as in hand r/m/u nerve distribution  AIN, PIN, and ulnar motor intact   +Radial pulse  +cap refill      Labs:  Recent Labs   Lab Test 23  1015 10/03/22  0943 21  0900   POTASSIUM 3.9 4.7 4.5     Recent Labs   Lab Test 23  1015 21  1604 20  1504   HGB 11.8 12.1 12.8     No results for input(s): INR in the last 80066 hours.  Recent Labs   Lab Test 21  1604 18  1241 10/11/18  0856    245 317       A/P  1. Plan   Continue ASA & SCDs for DVT prophylaxis.     Mobilize with PT/OT - okay for Codman's/pendulum swing exercises, gentle ROM at the elbow and wrist, and fist pumps.     Ultrasling to RUE   Leave dressing C/D/I   Continue current pain regiment.   Sling in place   Follow up with Carline Godfrey PA-C in clinic in 2 weeks.      2. Disposition   Anticipate d/c to home today.     Milla Godfrey PA-C

## 2023-03-09 NOTE — PLAN OF CARE
Occupational Therapy Discharge Summary    Reason for therapy discharge:    All goals and outcomes met, no further needs identified.    Progress towards therapy goal(s). See goals on Care Plan in [facility] electronic health record for goal details.  Goals met    Therapy recommendation(s):    Defer to ortho. Anticipate at discharge pt will be modified independent in dressing, toileting, bathing, functional mobility. Recommend pt have assist with strenuous IADLs (cooking, cleaning, laundry, etc). Recommend daily check-ins. Pt reports she can have this level of assist at discharge. Pt has needed self-care adaptive equipment

## 2023-04-23 ENCOUNTER — HEALTH MAINTENANCE LETTER (OUTPATIENT)
Age: 73
End: 2023-04-23

## 2023-05-05 DIAGNOSIS — F39 MOOD DISORDER (H): ICD-10-CM

## 2023-05-05 RX ORDER — HYDROXYZINE HYDROCHLORIDE 25 MG/1
TABLET, FILM COATED ORAL
Qty: 60 TABLET | Refills: 0 | Status: SHIPPED | OUTPATIENT
Start: 2023-05-05 | End: 2023-06-21

## 2023-05-10 ENCOUNTER — OFFICE VISIT (OUTPATIENT)
Dept: AUDIOLOGY | Facility: CLINIC | Age: 73
End: 2023-05-10
Payer: MEDICARE

## 2023-05-10 ENCOUNTER — OFFICE VISIT (OUTPATIENT)
Dept: OTOLARYNGOLOGY | Facility: CLINIC | Age: 73
End: 2023-05-10
Payer: MEDICARE

## 2023-05-10 DIAGNOSIS — H91.93 UNSPECIFIED HEARING LOSS, BILATERAL: Primary | ICD-10-CM

## 2023-05-10 DIAGNOSIS — H61.23 BILATERAL IMPACTED CERUMEN: Primary | ICD-10-CM

## 2023-05-10 DIAGNOSIS — H93.13 TINNITUS, BILATERAL: ICD-10-CM

## 2023-05-10 DIAGNOSIS — H93.13 TINNITUS OF BOTH EARS: ICD-10-CM

## 2023-05-10 DIAGNOSIS — H90.3 SENSORINEURAL HEARING LOSS (SNHL) OF BOTH EARS: ICD-10-CM

## 2023-05-10 PROCEDURE — 92550 TYMPANOMETRY & REFLEX THRESH: CPT | Performed by: AUDIOLOGIST

## 2023-05-10 PROCEDURE — 92504 EAR MICROSCOPY EXAMINATION: CPT | Performed by: OTOLARYNGOLOGY

## 2023-05-10 PROCEDURE — 99203 OFFICE O/P NEW LOW 30 MIN: CPT | Mod: 25 | Performed by: OTOLARYNGOLOGY

## 2023-05-10 PROCEDURE — 92557 COMPREHENSIVE HEARING TEST: CPT | Performed by: AUDIOLOGIST

## 2023-05-10 NOTE — PROGRESS NOTES
AUDIOLOGY REPORT    SUMMARY: Audiology visit completed. See audiogram for results. Abuse screening not completed due to same day appt with ENT clinic, where this is addressed.      RECOMMENDATIONS: Follow-up with ENT.      Norbert Schneider, [facility]  [place] Licensed Audiologist [id number]

## 2023-05-10 NOTE — PROGRESS NOTES
CHIEF COMPLAINT: Patient presents with:  Cerumen Impaction: Ear Cleaning before Audio, Tinnitus, balance issues, dizziness, bilateral hearing loss          HISTORY OF PRESENT ILLNESS    Aaliyah was seen at the behest of BalfanzJenae for audiogram review.     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 Notes occasional bilateral tinnitus (not pulsatile or  debilitating). L cerumen occlusion noted on exam 1-26-23; cleaned earlier today by YARITZA Luo. Pt. notes decreased hearing, L>R, with  gradual onset over many years. Denied vertigo, otalgia, otorrhea, recent illness, aural fullness. Results: Clear canals, bilat. Normal/  borderline normal hearing sensitivity for 250-4000 Hz, sloping to mild to moderate, likely SNHL affecting 2232-5562 Hz, bilat. Excellent  word rec ability in quiet, bilat. Normal tymps, bilat. Present 1 KHz acoustic reflexes, bilat. Rec: F/U w/ENT; retest per med. mgmt. or  annually to monitor hearing thresholds. Wear hearing protection consistently in noise to preserve residual hearing sensitivity and to  minimize the effects of tinnitus. Not yet an ideal amplification candidate for either ear. Discussed communication strategies and tinnitus  mgmt./triggers.       REVIEW OF SYSTEMS    Review of Systems as per HPI and PMHx, otherwise 10 system review system are negative.       ALLERGIES    Blood transfusion related (informational only), Ciprofloxacin, Oxycodone, Sulfa antibiotics, and Dilaudid [hydromorphone]    CURRENT MEDICATIONS      Current Outpatient Medications:      acetaminophen (TYLENOL) 325 MG tablet, Take 1 tablet (325 mg) by mouth every 4 hours as needed for other (mild pain), Disp: 100 tablet, Rfl: 0     Ascorbic Acid (VITAMIN C PO), Take 1,000 mg by mouth daily, Disp: , Rfl:      atenolol (TENORMIN) 25 MG tablet, Take 0.5 tablets (12.5 mg) by mouth daily, Disp: 45 tablet, Rfl: 3     calcium carbonate (OSCAL 500) 1250 (500 CA)  MG TABS tablet, Take 1,250 mg by mouth daily, Disp: , Rfl:      chlorthalidone (HYGROTON) 25 MG tablet, Take 0.5 tablets (12.5 mg) by mouth daily, Disp: 90 tablet, Rfl: 2     diphenhydrAMINE (BENADRYL) 25 MG tablet, Take 25 mg by mouth nightly as needed, Disp: , Rfl:      hydrOXYzine (ATARAX) 25 MG tablet, TAKE 1 TABLET BY MOUTH THREE TIMES DAILY AS NEEDED FOR ITCHING,ANXIETY OR OTHER (SLEEP), Disp: 60 tablet, Rfl: 0     lisinopril (ZESTRIL) 20 MG tablet, Take 1 tablet (20 mg) by mouth daily, Disp: 90 tablet, Rfl: 3     magnesium 250 MG tablet, Take 1 tablet by mouth At Bedtime, Disp: , Rfl:      multivitamin w/minerals (THERA-VIT-M) tablet, Take 1 tablet by mouth every morning , Disp: 100 tablet, Rfl: 3     Omega-3 Fatty Acids (OMEGA 3 500) 500 MG CAPS, Take 500 mg by mouth daily, Disp: , Rfl:      potassium gluconate 2.5 MEQ TABS, Take 5 mEq by mouth daily, Disp: , Rfl:      valACYclovir (VALTREX) 1000 mg tablet, Take 1 tablet (1,000 mg) by mouth daily, Disp: 90 tablet, Rfl: 0     vitamin B-Complex, Take 1 tablet by mouth daily, Disp: , Rfl:      PAST MEDICAL HISTORY    PAST MEDICAL HISTORY:   Past Medical History:   Diagnosis Date     Arthritis      Atypical squamous cells cannot exclude high grade squamous intraepithelial lesion on cytologic smear of cervix (ASC-H) 06/09/2017 06/09/17 ASC-H, Neg HPV, Endometrial cells present. [place]= SELENE 1, EMB= Benign     Facial cellulitis 11/22/2015     HTN (hypertension)      Perforated sigmoid colon (H) 01/06/2017     Sleep apnea        PAST SURGICAL HISTORY    PAST SURGICAL HISTORY:   Past Surgical History:   Procedure Laterality Date     APPENDECTOMY  2012     COLECTOMY LEFT N/A 1/6/2017    Procedure: COLECTOMY LEFT;  Surgeon: David Gaitan MD;  Location: [facility]     COLONOSCOPY N/A 1/6/2017    Procedure: COLONOSCOPY;  Surgeon: Regis Mckoy MD;  Location:  GI     LAPAROSCOPIC ASSISTED SIGMOID COLECTOMY N/A 1/6/2017    Procedure: LAPAROSCOPIC ASSISTED  SIGMOID COLECTOMY;  Surgeon: David Gaitan MD;  Location: [facility] OR     ORTHOPEDIC SURGERY       REVERSE ARTHROPLASTY SHOULDER Left 11/20/2018    Procedure: Left reverse total shoulder arthroplasty;  Surgeon: Cricket Shah MD;  Location: [facility] OR     REVERSE ARTHROPLASTY SHOULDER Right 3/8/2023    Procedure: Right reverse total shoulder arthroplasty;  Surgeon: Cricket Shah MD;  Location: [facility] OR     SURGICAL HISTORY OF -   09/2010    Rotator cuff repair-right shoulder     SURGICAL HISTORY OF -   12/2011    Right hip replacement     SURGICAL HISTORY OF -   2005    ACL and MCL left knee       FAMILY  HISTORY    FAMILY HISTORY:   Family History   Problem Relation Age of Onset     Diabetes Mother 84        Type 2     Hypertension Mother      Alcohol/Drug Father      C.A.D. Paternal Grandmother      Colon Cancer No family hx of      Glaucoma No family hx of      Macular Degeneration No family hx of        SOCIAL HISTORY    SOCIAL HISTORY:   Social History     Tobacco Use     Smoking status: Never     Smokeless tobacco: Never   Vaping Use     Vaping status: Never Used   Substance Use Topics     Alcohol use: Yes     Alcohol/week: 0.0 standard drinks of alcohol     Comment: 1 glass of wine per night        PHYSICAL EXAM    HEAD: Normal appearance and symmetry:  No cutaneous lesions.      NECK:  supple     EARS:    Right:   Cerumen present   LEFT:  Cerumen present     CERUMEN IMPACTION REMOVAL    After obtaining verbal consent, and using the binocular microscope.  Cerumen impaction(s) were removed from the affected ear canal(s)  using a wire loops and/or suction.  The patient tolerated the procedure well without incident.      EYES:  EOMI    CN VII/XII:  intact     NOSE:     Dorsum:   straight  Septum:  midline  Mucosa:  moist        ORAL CAVITY/OROPHARYNX:     Lips:  Normal.  Tongue: normal, midline  Mucosa:   no lesions     NECK:  Trachea:  midline.              Thyroid:  normal              Adenopathy:  none        NEURO:    Alert and Oriented     GAIT AND STATION:  normal     RESPIRATORY:   Symmetry and Respiratory effort     PSYCH:  Normal mood and affect     SKIN:   warm and dry         IMPRESSION:    Encounter Diagnoses   Name Primary?     Bilateral impacted cerumen Yes     Sensorineural hearing loss (SNHL) of both ears      Tinnitus, bilateral      Pathophysiology of tinnitus discussed with the patient and audiogram is reviewed in detail.     RECOMMENDATIONS:      Orders Placed This Encounter   Procedures     Remove Cerumen        Return visit 6 months for ear cleaning.  Recommend annual hearing test.  Return immediately for any sudden loss of hearing.

## 2023-05-10 NOTE — LETTER
5/10/2023         RE: Aaliyah Hayes  [address]  [place] [address]        Dear Colleague,    Thank you for referring your patient, Aaliyah Hayes, to the [facility]. Please see a copy of my visit note below.    CHIEF COMPLAINT: Patient presents with:  Cerumen Impaction: Ear Cleaning before Audio, Tinnitus, balance issues, dizziness, bilateral hearing loss          HISTORY OF PRESENT ILLNESS    Aaliyah was seen at the behest of BalfanzJeane for audiogram review.     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 Notes occasional bilateral tinnitus (not pulsatile or  debilitating). L cerumen occlusion noted on exam 1-26-23; cleaned earlier today by YARITZA Luo. Pt. notes decreased hearing, L>R, with  gradual onset over many years. Denied vertigo, otalgia, otorrhea, recent illness, aural fullness. Results: Clear canals, bilat. Normal/  borderline normal hearing sensitivity for 250-4000 Hz, sloping to mild to moderate, likely SNHL affecting 6184-8397 Hz, bilat. Excellent  word rec ability in quiet, bilat. Normal tymps, bilat. Present 1 KHz acoustic reflexes, bilat. Rec: F/U w/ENT; retest per med. mgmt. or  annually to monitor hearing thresholds. Wear hearing protection consistently in noise to preserve residual hearing sensitivity and to  minimize the effects of tinnitus. Not yet an ideal amplification candidate for either ear. Discussed communication strategies and tinnitus  mgmt./triggers.       REVIEW OF SYSTEMS    Review of Systems as per HPI and PMHx, otherwise 10 system review system are negative.       ALLERGIES    Blood transfusion related (informational only), Ciprofloxacin, Oxycodone, Sulfa antibiotics, and Dilaudid [hydromorphone]    CURRENT MEDICATIONS      Current Outpatient Medications:      acetaminophen (TYLENOL) 325 MG tablet, Take 1 tablet (325 mg) by mouth every 4 hours as needed for  other (mild pain), Disp: 100 tablet, Rfl: 0     Ascorbic Acid (VITAMIN C PO), Take 1,000 mg by mouth daily, Disp: , Rfl:      atenolol (TENORMIN) 25 MG tablet, Take 0.5 tablets (12.5 mg) by mouth daily, Disp: 45 tablet, Rfl: 3     calcium carbonate (OSCAL 500) 1250 (500 CA) MG TABS tablet, Take 1,250 mg by mouth daily, Disp: , Rfl:      chlorthalidone (HYGROTON) 25 MG tablet, Take 0.5 tablets (12.5 mg) by mouth daily, Disp: 90 tablet, Rfl: 2     diphenhydrAMINE (BENADRYL) 25 MG tablet, Take 25 mg by mouth nightly as needed, Disp: , Rfl:      hydrOXYzine (ATARAX) 25 MG tablet, TAKE 1 TABLET BY MOUTH THREE TIMES DAILY AS NEEDED FOR ITCHING,ANXIETY OR OTHER (SLEEP), Disp: 60 tablet, Rfl: 0     lisinopril (ZESTRIL) 20 MG tablet, Take 1 tablet (20 mg) by mouth daily, Disp: 90 tablet, Rfl: 3     magnesium 250 MG tablet, Take 1 tablet by mouth At Bedtime, Disp: , Rfl:      multivitamin w/minerals (THERA-VIT-M) tablet, Take 1 tablet by mouth every morning , Disp: 100 tablet, Rfl: 3     Omega-3 Fatty Acids (OMEGA 3 500) 500 MG CAPS, Take 500 mg by mouth daily, Disp: , Rfl:      potassium gluconate 2.5 MEQ TABS, Take 5 mEq by mouth daily, Disp: , Rfl:      valACYclovir (VALTREX) 1000 mg tablet, Take 1 tablet (1,000 mg) by mouth daily, Disp: 90 tablet, Rfl: 0     vitamin B-Complex, Take 1 tablet by mouth daily, Disp: , Rfl:      PAST MEDICAL HISTORY    PAST MEDICAL HISTORY:   Past Medical History:   Diagnosis Date     Arthritis      Atypical squamous cells cannot exclude high grade squamous intraepithelial lesion on cytologic smear of cervix (ASC-H) 06/09/2017 06/09/17 ASC-H, Neg HPV, Endometrial cells present. [place]= SELENE 1, EMB= Benign     Facial cellulitis 11/22/2015     HTN (hypertension)      Perforated sigmoid colon (H) 01/06/2017     Sleep apnea        PAST SURGICAL HISTORY    PAST SURGICAL HISTORY:   Past Surgical History:   Procedure Laterality Date     APPENDECTOMY  2012     COLECTOMY LEFT N/A 1/6/2017    Procedure:  COLECTOMY LEFT;  Surgeon: David Gaitan MD;  Location: [facility]     COLONOSCOPY N/A 1/6/2017    Procedure: COLONOSCOPY;  Surgeon: Regis Mckoy MD;  Location: [facility] GI     LAPAROSCOPIC ASSISTED SIGMOID COLECTOMY N/A 1/6/2017    Procedure: LAPAROSCOPIC ASSISTED SIGMOID COLECTOMY;  Surgeon: David Gaitan MD;  Location: [facility]     ORTHOPEDIC SURGERY       REVERSE ARTHROPLASTY SHOULDER Left 11/20/2018    Procedure: Left reverse total shoulder arthroplasty;  Surgeon: Cricket Shah MD;  Location: [facility]     REVERSE ARTHROPLASTY SHOULDER Right 3/8/2023    Procedure: Right reverse total shoulder arthroplasty;  Surgeon: Cricket Shah MD;  Location: [facility]     SURGICAL HISTORY OF -   09/2010    Rotator cuff repair-right shoulder     SURGICAL HISTORY OF -   12/2011    Right hip replacement     SURGICAL HISTORY OF -   2005    ACL and MCL left knee       FAMILY  HISTORY    FAMILY HISTORY:   Family History   Problem Relation Age of Onset     Diabetes Mother 84        Type 2     Hypertension Mother      Alcohol/Drug Father      C.A.D. Paternal Grandmother      Colon Cancer No family hx of      Glaucoma No family hx of      Macular Degeneration No family hx of        SOCIAL HISTORY    SOCIAL HISTORY:   Social History     Tobacco Use     Smoking status: Never     Smokeless tobacco: Never   Vaping Use     Vaping status: Never Used   Substance Use Topics     Alcohol use: Yes     Alcohol/week: 0.0 standard drinks of alcohol     Comment: 1 glass of wine per night        PHYSICAL EXAM    HEAD: Normal appearance and symmetry:  No cutaneous lesions.      NECK:  supple     EARS:    Right:   Cerumen present   LEFT:  Cerumen present     CERUMEN IMPACTION REMOVAL    After obtaining verbal consent, and using the binocular microscope.  Cerumen impaction(s) were removed from the affected ear canal(s)  using a wire loops and/or suction.  The patient tolerated the procedure well without incident.      EYES:  EOMI    CN VII/XII:  intact      NOSE:     Dorsum:   straight  Septum:  midline  Mucosa:  moist        ORAL CAVITY/OROPHARYNX:     Lips:  Normal.  Tongue: normal, midline  Mucosa:   no lesions     NECK:  Trachea:  midline.              Thyroid:  normal              Adenopathy:  none        NEURO:   Alert and Oriented     GAIT AND STATION:  normal     RESPIRATORY:   Symmetry and Respiratory effort     PSYCH:  Normal mood and affect     SKIN:   warm and dry         IMPRESSION:    Encounter Diagnoses   Name Primary?     Bilateral impacted cerumen Yes     Sensorineural hearing loss (SNHL) of both ears      Tinnitus, bilateral      Pathophysiology of tinnitus discussed with the patient and audiogram is reviewed in detail.     RECOMMENDATIONS:      Orders Placed This Encounter   Procedures     Remove Cerumen        Return visit 6 months for ear cleaning.  Recommend annual hearing test.  Return immediately for any sudden loss of hearing.        Again, thank you for allowing me to participate in the care of your patient.        Sincerely,        Raheel Luo MD

## 2023-06-19 DIAGNOSIS — F39 MOOD DISORDER (H): ICD-10-CM

## 2023-06-21 RX ORDER — HYDROXYZINE HYDROCHLORIDE 25 MG/1
TABLET, FILM COATED ORAL
Qty: 60 TABLET | Refills: 0 | Status: SHIPPED | OUTPATIENT
Start: 2023-06-21 | End: 2023-08-17

## 2023-08-14 DIAGNOSIS — F39 MOOD DISORDER (H): ICD-10-CM

## 2023-08-14 DIAGNOSIS — I10 BENIGN ESSENTIAL HYPERTENSION: ICD-10-CM

## 2023-08-17 RX ORDER — ATENOLOL 25 MG/1
12.5 TABLET ORAL DAILY
Qty: 45 TABLET | Refills: 1 | Status: SHIPPED | OUTPATIENT
Start: 2023-08-17 | End: 2023-11-16

## 2023-08-17 RX ORDER — HYDROXYZINE HYDROCHLORIDE 25 MG/1
TABLET, FILM COATED ORAL
Qty: 60 TABLET | Refills: 0 | Status: SHIPPED | OUTPATIENT
Start: 2023-08-17 | End: 2023-10-17

## 2023-08-17 NOTE — TELEPHONE ENCOUNTER
Prescription approved per [facility] Refill Protocol.   ZACHARY Alvares RN

## 2023-09-07 ENCOUNTER — PATIENT OUTREACH (OUTPATIENT)
Dept: CARE COORDINATION | Facility: CLINIC | Age: 73
End: 2023-09-07
Payer: MEDICARE

## 2023-10-16 DIAGNOSIS — F39 MOOD DISORDER (H): ICD-10-CM

## 2023-10-17 RX ORDER — HYDROXYZINE HYDROCHLORIDE 25 MG/1
TABLET, FILM COATED ORAL
Qty: 60 TABLET | Refills: 0 | Status: SHIPPED | OUTPATIENT
Start: 2023-10-17 | End: 2023-11-16

## 2023-10-30 ENCOUNTER — DOCUMENTATION ONLY (OUTPATIENT)
Dept: LAB | Facility: CLINIC | Age: 73
End: 2023-10-30
Payer: MEDICARE

## 2023-10-30 DIAGNOSIS — Z13.1 SCREENING FOR DIABETES MELLITUS: ICD-10-CM

## 2023-10-30 DIAGNOSIS — I10 BENIGN ESSENTIAL HYPERTENSION: Primary | ICD-10-CM

## 2023-10-30 DIAGNOSIS — R79.9 ABNORMAL FINDING OF BLOOD CHEMISTRY, UNSPECIFIED: ICD-10-CM

## 2023-10-30 NOTE — PROGRESS NOTES
Aaliyah Hayes has an upcoming lab appointment:    Future Appointments   Date Time Provider Department Center   11/13/2023 11:45 AM [facility]   11/16/2023 10:30 AM Maria Alejandra Lennon PA-C [facility]     Patient is scheduled for the following lab(s):   Scheduling Notes: Lab draws for routine yearly Wellness Preventative appointment that is scheduled  for November 16 AM - NEEDS DOCTOR ORDERS PRIOR!   Made On: 9/9/2023 6:57 PM By: JUAN JOSÉ ESCALONA       There is no order available. Please review and place either future orders or HMPO (Review of Health Maintenance Protocol Orders), as appropriate.    Health Maintenance Due   Topic    A1C     CMP     LIPID     MICROALBUMIN     ANNUAL REVIEW OF HM ORDERS      Devin Hamm

## 2023-11-07 ENCOUNTER — OFFICE VISIT (OUTPATIENT)
Dept: PEDIATRICS | Facility: CLINIC | Age: 73
End: 2023-11-07
Payer: MEDICARE

## 2023-11-07 DIAGNOSIS — T78.40XA ALLERGIC REACTION, INITIAL ENCOUNTER: Primary | ICD-10-CM

## 2023-11-07 PROCEDURE — 99213 OFFICE O/P EST LOW 20 MIN: CPT | Performed by: PHYSICIAN ASSISTANT

## 2023-11-07 RX ORDER — PREDNISONE 10 MG/1
TABLET ORAL
Qty: 12 TABLET | Refills: 0 | Status: SHIPPED | OUTPATIENT
Start: 2023-11-07

## 2023-11-07 NOTE — PATIENT INSTRUCTIONS
Begin oral prednisone--take in the AM with food   Zyrtec 10 mg daily and then benedryl at night   Cool compresses

## 2023-11-07 NOTE — PROGRESS NOTES
Assessment & Plan     Allergic reaction, initial encounter  Unknown etiology. Begin pred taper and antihistamines. Cool compresses at site.   - predniSONE (DELTASONE) 10 MG tablet; Take 3 tablets daily x 2 days, then 2 tabs daily x 2 days, then 1 tab daily x 2 days      DEANA [facility] ALEX Fischer is a 73 year old, presenting for the following health issues:    URI symptoms x two weeks. Last night, right sided facial numbness and tingling of the lip. Improved. Now patient has numbness and tingling of the left lip and into the left cheek. Fullness. No redness. No pain. No warmth.   Lymph nodes swollen bilaterally. Slight headache. Throat feels full. No cough. No fevers, chills. Fatigued.     NEGATIVE covid.     History of shingles and vaccinated.     No other exposures.     Retired. Granddaughter ill.     Took aspirin last night.       Review of Systems   Constitutional, HEENT, cardiovascular, pulmonary, gi and gu systems are negative, except as otherwise noted.      Objective    There were no vitals taken for this visit. Forgot to obtain vitals.  There is no height or weight on file to calculate BMI.  Physical Exam   GENERAL:  alert and no distress  EYES: Eyes grossly normal to inspection, PERRL and conjunctivae and sclerae normal  HENT: ear canals and TM's normal, nose wnl. Upper lip diffusely edematous. No erythema, warmth, tenderness. No lesions.   NECK: no adenopathy  RESP: lungs clear to auscultation - no rales, rhonchi or wheezes  CV: regular rate and rhythm, normal S1 S2, no S3 or S4    No results found for this or any previous visit (from the past 24 hour(s)).

## 2023-11-09 ASSESSMENT — ENCOUNTER SYMPTOMS
BREAST MASS: 0
HEMATOCHEZIA: 0
DIARRHEA: 0
HEARTBURN: 0
EYE PAIN: 0
PALPITATIONS: 0
ABDOMINAL PAIN: 0
JOINT SWELLING: 0
CHILLS: 0
CONSTIPATION: 0
NAUSEA: 0
WEAKNESS: 0
DYSURIA: 0
HEMATURIA: 0
PARESTHESIAS: 1
NERVOUS/ANXIOUS: 0
FREQUENCY: 0
MYALGIAS: 1
ARTHRALGIAS: 0
SORE THROAT: 0
HEADACHES: 0
FEVER: 0
SHORTNESS OF BREATH: 0
DIZZINESS: 1
COUGH: 0

## 2023-11-09 ASSESSMENT — ACTIVITIES OF DAILY LIVING (ADL): CURRENT_FUNCTION: NO ASSISTANCE NEEDED

## 2023-11-13 ENCOUNTER — LAB (OUTPATIENT)
Dept: LAB | Facility: CLINIC | Age: 73
End: 2023-11-13
Payer: MEDICARE

## 2023-11-13 DIAGNOSIS — I10 BENIGN ESSENTIAL HYPERTENSION: ICD-10-CM

## 2023-11-13 DIAGNOSIS — Z13.1 SCREENING FOR DIABETES MELLITUS: ICD-10-CM

## 2023-11-13 DIAGNOSIS — R79.9 ABNORMAL FINDING OF BLOOD CHEMISTRY, UNSPECIFIED: ICD-10-CM

## 2023-11-13 LAB — HBA1C MFR BLD: 5.3 % (ref 0–5.6)

## 2023-11-13 PROCEDURE — 82043 UR ALBUMIN QUANTITATIVE: CPT

## 2023-11-13 PROCEDURE — 82570 ASSAY OF URINE CREATININE: CPT

## 2023-11-13 PROCEDURE — 83036 HEMOGLOBIN GLYCOSYLATED A1C: CPT

## 2023-11-13 PROCEDURE — 80053 COMPREHEN METABOLIC PANEL: CPT

## 2023-11-13 PROCEDURE — 36415 COLL VENOUS BLD VENIPUNCTURE: CPT

## 2023-11-13 PROCEDURE — 80061 LIPID PANEL: CPT

## 2023-11-14 LAB
ALBUMIN SERPL BCG-MCNC: 4.4 G/DL (ref 3.5–5.2)
ALP SERPL-CCNC: 57 U/L (ref 35–104)
ALT SERPL W P-5'-P-CCNC: 22 U/L (ref 0–50)
ANION GAP SERPL CALCULATED.3IONS-SCNC: 11 MMOL/L (ref 7–15)
AST SERPL W P-5'-P-CCNC: 27 U/L (ref 0–45)
BILIRUB SERPL-MCNC: 0.4 MG/DL
BUN SERPL-MCNC: 35.6 MG/DL (ref 8–23)
CALCIUM SERPL-MCNC: 9.7 MG/DL (ref 8.8–10.2)
CHLORIDE SERPL-SCNC: 100 MMOL/L (ref 98–107)
CHOLEST SERPL-MCNC: 235 MG/DL
CREAT SERPL-MCNC: 1.63 MG/DL (ref 0.51–0.95)
CREAT UR-MCNC: 88 MG/DL
DEPRECATED HCO3 PLAS-SCNC: 25 MMOL/L (ref 22–29)
EGFRCR SERPLBLD CKD-EPI 2021: 33 ML/MIN/1.73M2
GLUCOSE SERPL-MCNC: 84 MG/DL (ref 70–99)
HDLC SERPL-MCNC: 66 MG/DL
LDLC SERPL CALC-MCNC: 130 MG/DL
MICROALBUMIN UR-MCNC: <12 MG/L
MICROALBUMIN/CREAT UR: NORMAL MG/G{CREAT}
NONHDLC SERPL-MCNC: 169 MG/DL
POTASSIUM SERPL-SCNC: 4.9 MMOL/L (ref 3.4–5.3)
PROT SERPL-MCNC: 7.3 G/DL (ref 6.4–8.3)
SODIUM SERPL-SCNC: 136 MMOL/L (ref 135–145)
TRIGL SERPL-MCNC: 196 MG/DL

## 2023-11-16 ENCOUNTER — OFFICE VISIT (OUTPATIENT)
Dept: PEDIATRICS | Facility: CLINIC | Age: 73
End: 2023-11-16
Payer: MEDICARE

## 2023-11-16 VITALS
BODY MASS INDEX: 25.92 KG/M2 | DIASTOLIC BLOOD PRESSURE: 72 MMHG | WEIGHT: 128.6 LBS | SYSTOLIC BLOOD PRESSURE: 133 MMHG | OXYGEN SATURATION: 99 % | RESPIRATION RATE: 18 BRPM | HEART RATE: 65 BPM | HEIGHT: 59 IN | TEMPERATURE: 97.6 F

## 2023-11-16 DIAGNOSIS — I10 BENIGN ESSENTIAL HYPERTENSION: ICD-10-CM

## 2023-11-16 DIAGNOSIS — Z86.19 H/O HERPETIC WHITLOW: ICD-10-CM

## 2023-11-16 DIAGNOSIS — L98.9 SKIN LESION: ICD-10-CM

## 2023-11-16 DIAGNOSIS — Z12.4 CERVICAL CANCER SCREENING: ICD-10-CM

## 2023-11-16 DIAGNOSIS — Z00.00 ENCOUNTER FOR MEDICARE ANNUAL WELLNESS EXAM: Primary | ICD-10-CM

## 2023-11-16 DIAGNOSIS — E78.5 HYPERLIPIDEMIA WITH TARGET LDL LESS THAN 130: ICD-10-CM

## 2023-11-16 DIAGNOSIS — F39 MOOD DISORDER (H): ICD-10-CM

## 2023-11-16 DIAGNOSIS — Z87.42 HISTORY OF ABNORMAL CERVICAL PAP SMEAR: ICD-10-CM

## 2023-11-16 DIAGNOSIS — Z53.20 MAMMOGRAM DECLINED: ICD-10-CM

## 2023-11-16 DIAGNOSIS — N18.32 STAGE 3B CHRONIC KIDNEY DISEASE (H): ICD-10-CM

## 2023-11-16 DIAGNOSIS — Z28.21 INFLUENZA VACCINATION DECLINED: ICD-10-CM

## 2023-11-16 PROCEDURE — 99214 OFFICE O/P EST MOD 30 MIN: CPT | Mod: 25 | Performed by: PHYSICIAN ASSISTANT

## 2023-11-16 PROCEDURE — 87624 HPV HI-RISK TYP POOLED RSLT: CPT | Mod: GZ | Performed by: PHYSICIAN ASSISTANT

## 2023-11-16 PROCEDURE — G0439 PPPS, SUBSEQ VISIT: HCPCS | Performed by: PHYSICIAN ASSISTANT

## 2023-11-16 PROCEDURE — G0145 SCR C/V CYTO,THINLAYER,RESCR: HCPCS | Mod: GZ | Performed by: PHYSICIAN ASSISTANT

## 2023-11-16 RX ORDER — LISINOPRIL 20 MG/1
20 TABLET ORAL DAILY
Qty: 90 TABLET | Refills: 3 | Status: SHIPPED | OUTPATIENT
Start: 2023-11-16 | End: 2024-08-23 | Stop reason: DRUGHIGH

## 2023-11-16 RX ORDER — ATORVASTATIN CALCIUM 20 MG/1
20 TABLET, FILM COATED ORAL DAILY
Qty: 90 TABLET | Refills: 3 | Status: SHIPPED | OUTPATIENT
Start: 2023-11-16

## 2023-11-16 RX ORDER — RESPIRATORY SYNCYTIAL VIRUS VACCINE 120MCG/0.5
0.5 KIT INTRAMUSCULAR ONCE
Qty: 1 EACH | Refills: 0 | Status: CANCELLED | OUTPATIENT
Start: 2023-11-16 | End: 2023-11-16

## 2023-11-16 RX ORDER — HYDROXYZINE HYDROCHLORIDE 25 MG/1
TABLET, FILM COATED ORAL
Qty: 60 TABLET | Refills: 3 | Status: SHIPPED | OUTPATIENT
Start: 2023-11-16

## 2023-11-16 RX ORDER — ATENOLOL 25 MG/1
12.5 TABLET ORAL DAILY
Qty: 45 TABLET | Refills: 1 | Status: SHIPPED | OUTPATIENT
Start: 2023-11-16 | End: 2024-08-22

## 2023-11-16 RX ORDER — VALACYCLOVIR HYDROCHLORIDE 1 G/1
1000 TABLET, FILM COATED ORAL DAILY
Qty: 90 TABLET | Refills: 3 | Status: SHIPPED | OUTPATIENT
Start: 2023-11-16

## 2023-11-16 ASSESSMENT — ENCOUNTER SYMPTOMS
HEMATURIA: 0
BREAST MASS: 0
DIARRHEA: 0
DIZZINESS: 1
ARTHRALGIAS: 0
COUGH: 0
EYE PAIN: 0
NAUSEA: 0
HEMATOCHEZIA: 0
DYSURIA: 0
PARESTHESIAS: 1
NERVOUS/ANXIOUS: 0
ABDOMINAL PAIN: 0
SHORTNESS OF BREATH: 0
FREQUENCY: 0
PALPITATIONS: 0
JOINT SWELLING: 0
SORE THROAT: 0
MYALGIAS: 1
HEADACHES: 0
HEARTBURN: 0
WEAKNESS: 0
CHILLS: 0
FEVER: 0
CONSTIPATION: 0

## 2023-11-16 ASSESSMENT — ACTIVITIES OF DAILY LIVING (ADL): CURRENT_FUNCTION: NO ASSISTANCE NEEDED

## 2023-11-16 ASSESSMENT — PAIN SCALES - GENERAL: PAINLEVEL: SEVERE PAIN (7)

## 2023-11-16 NOTE — PROGRESS NOTES
"SUBJECTIVE:   Aaliyah is a 73 year old, presenting for the following:  Medicare Visit (Dermatology, possible lacrimal gland issue of left eye)        11/16/2023    10:28 AM   Additional Questions   Roomed by Ashlyn Vegas   Accompanied by BERNIE         11/16/2023    10:28 AM   Patient Reported Additional Medications   Patient reports taking the following new medications No       Are you in the first 12 months of your Medicare coverage?  No    Healthy Habits:     In general, how would you rate your overall health?  Excellent    Frequency of exercise:  4-5 days/week    Duration of exercise:  Greater than 60 minutes    Do you usually eat at least 4 servings of fruit and vegetables a day, include whole grains    & fiber and avoid regularly eating high fat or \"junk\" foods?  Yes    Taking medications regularly:  Yes    Medication side effects:  None    Ability to successfully perform activities of daily living:  No assistance needed    Home Safety:  No safety concerns identified    Hearing Impairment:  No hearing concerns    In the past 6 months, have you been bothered by leaking of urine?  No    In general, how would you rate your overall mental or emotional health?  Excellent    Additional concerns today:  Yes      Today's PHQ-2 Score:       11/16/2023    10:11 AM   PHQ-2 ( 1999 [employer])   Q1: Little interest or pleasure in doing things 0   Q2: Feeling down, depressed or hopeless 0   PHQ-2 Score 0   Q1: Little interest or pleasure in doing things Not at all   Q2: Feeling down, depressed or hopeless Not at all   PHQ-2 Score 0           Have you ever done Advance Care Planning? (For example, a Health Directive, POLST, or a discussion with a medical provider or your loved ones about your wishes): Yes, advance care planning is on file.       Fall risk  Fallen 2 or more times in the past year?: No  Any fall with injury in the past year?: No    Cognitive Screening   1) Repeat 3 items (Leader, Season, Table)    2) Clock draw: " NORMAL  3) 3 item recall:  Recalls 2 objects  Results: NORMAL clock, 1-2 items recalled: COGNITIVE IMPAIRMENT LESS LIKELY    Mini-CogTM Copyright SHARLA Lizarraga. Licensed by the author for use in [facility]; reprinted with permission (buck@[facility]). All rights reserved.      Do you have sleep apnea, excessive snoring or daytime drowsiness? : no    Reviewed and updated as needed this visit by clinical staff   Tobacco  Allergies  Meds              Reviewed and updated as needed this visit by Provider                 Social History     Tobacco Use     Smoking status: Never     Smokeless tobacco: Never   Substance Use Topics     Alcohol use: Yes     Alcohol/week: 0.0 standard drinks of alcohol     Comment: 1 glass of wine per night             11/9/2023     2:33 PM   Alcohol Use   Prescreen: >3 drinks/day or >7 drinks/week? No     Do you have a current opioid prescription? No  Do you use any other controlled substances or medications that are not prescribed by a provider? Alcohol              Current providers sharing in care for this patient include:   Patient Care Team:  Yesi Morrison MD as PCP - General (Internal Medicine - Pediatrics)  Nanette Stark AuD as Audiologist (Audiology)  Jenae Robles APRN CNP as Assigned PCP  Raheel Luo MD as Assigned Surgical Provider    The following health maintenance items are reviewed in Epic and correct as of today:  Health Maintenance   Topic Date Due     RSV VACCINE (Pregnancy & 60+) (1 - 1-dose 60+ series) Never done     ZOSTER IMMUNIZATION (2 of 3) 06/28/2013     MAMMO SCREENING  10/27/2022     DTAP/TDAP/TD IMMUNIZATION (2 - Td or Tdap) 06/14/2023     INFLUENZA VACCINE (1) 09/01/2023     COVID-19 Vaccine (3 - 2023-24 season) 09/01/2023     PAP FOLLOW-UP  09/16/2023     HPV FOLLOW-UP  09/16/2023     MEDICARE ANNUAL WELLNESS VISIT  10/07/2023     ANNUAL REVIEW OF HM ORDERS  10/07/2023     HEMOGLOBIN  03/09/2024     A1C  11/13/2024     BMP  11/13/2024      CMP  11/13/2024     DEXA  11/13/2024     LIPID  11/13/2024     MICROALBUMIN  11/13/2024     FALL RISK ASSESSMENT  11/16/2024     COLORECTAL CANCER SCREENING  01/06/2027     ADVANCE CARE PLANNING  10/07/2027     HEPATITIS C SCREENING  Completed     PHQ-2 (once per calendar year)  Completed     Pneumococcal Vaccine: 65+ Years  Completed     URINALYSIS  Completed     IPV IMMUNIZATION  Aged Out     HPV IMMUNIZATION  Aged Out     MENINGITIS IMMUNIZATION  Aged Out     RSV MONOCLONAL ANTIBODY  Aged Out               The 10-year ASCVD risk score (Nicci LEHMAN, et al., 2019) is: 15.2%    Values used to calculate the score:      Age: 73 years      Sex: Female      Is Non- : No      Diabetic: No      Tobacco smoker: No      Systolic Blood Pressure: 133 mmHg      Is BP treated: Yes      HDL Cholesterol: 66 mg/dL      Total Cholesterol: 235 mg/dL     Review of Systems   Constitutional:  Negative for chills and fever.   HENT:  Negative for congestion, ear pain, hearing loss and sore throat.    Eyes:  Negative for pain and visual disturbance.   Respiratory:  Negative for cough and shortness of breath.    Cardiovascular:  Negative for chest pain, palpitations and peripheral edema.   Gastrointestinal:  Negative for abdominal pain, constipation, diarrhea, heartburn, hematochezia and nausea.   Breasts:  Negative for tenderness, breast mass and discharge.   Genitourinary:  Negative for dysuria, frequency, genital sores, hematuria, pelvic pain, urgency, vaginal bleeding and vaginal discharge.   Musculoskeletal:  Positive for myalgias. Negative for arthralgias and joint swelling.   Skin:  Negative for rash.   Neurological:  Positive for dizziness and paresthesias. Negative for weakness and headaches.   Psychiatric/Behavioral:  Negative for mood changes. The patient is not nervous/anxious.      1. Concern about skin lesion left eye lid area. Has had from some time. Scabs over. No tx.  2. Concern about recent labs for  "kidney. No new tx. No new medication changes  3. Needs medication refill. Bp medications- tolerates well. Takes daily. Normally has normal readings. Does get regular exercise- cardio and strength training. Eats well.  4. Not on statin-   5. Refill hydroxyzine- finds it helpful. No side effects. No falls.  6. Valtrex refill - herpetic ritesh, worked as dental hygienist. Has been helpful        OBJECTIVE:   /72 (BP Location: Right arm, Patient Position: Sitting, Cuff Size: Adult Regular)   Pulse 65   Temp 97.6  F (36.4  C) (Oral)   Resp 18   Ht 1.499 m (4' 11\")   Wt 58.3 kg (128 lb 9.6 oz)   SpO2 99%   BMI 25.97 kg/m   Estimated body mass index is 25.97 kg/m  as calculated from the following:    Height as of this encounter: 1.499 m (4' 11\").    Weight as of this encounter: 58.3 kg (128 lb 9.6 oz).  Physical Exam  GENERAL: healthy, alert and no distress  EYES: Eyes grossly normal to inspection, PERRL and conjunctivae and sclerae normal  HENT: ear canals and TM's normal, nose and mouth without ulcers or lesions  NECK: no adenopathy, no asymmetry, masses, or scars and thyroid normal to palpation  RESP: lungs clear to auscultation - no rales, rhonchi or wheezes  CV: regular rate and rhythm, normal S1 S2, no S3 or S4, no murmur, click or rub, no peripheral edema and peripheral pulses strong  ABDOMEN: soft, nontender, no hepatosplenomegaly, no masses and bowel sounds normal   (female): normal female external genitalia, normal urethral meatus , vaginal mucosa pink, moist, well rugated, and normal cervix, adnexae, and uterus without masses.  MS: no gross musculoskeletal defects noted, no edema  SKIN: left medial corner of eye near nose- 3 mm skin colored lesion  NEURO: Normal strength and tone, mentation intact and speech normal  PSYCH: mentation appears normal, affect normal/bright    Diagnostic Test Results:  Labs reviewed in Epic    ASSESSMENT / PLAN:   Aaliyah was seen today for medicare visit.    Diagnoses " "and all orders for this visit:    Encounter for Medicare annual wellness exam  Schedule annual in one year.    Cervical cancer screening  Due to abn pap, last pap normal- 2020 and was to repeat in 2023    Mood disorder (H24)  -     hydrOXYzine (ATARAX) 25 MG tablet; TAKE 1 TABLET BY MOUTH THREE TIMES DAILY AS NEEDED FOR ITCHING AND FOR ANXIETY AND FOR SLEEP  Refilled. Tolerated well.    H/O herpetic ritesh  -     valACYclovir (VALTREX) 1000 mg tablet; Take 1 tablet (1,000 mg) by mouth daily  Refilled. Stable    Skin lesion  -     Adult Dermatology  Referral; Future    Benign essential hypertension  -     lisinopril (ZESTRIL) 20 MG tablet; Take 1 tablet (20 mg) by mouth daily  Refilled today.    Stage 3b chronic kidney disease (H)  -     lisinopril (ZESTRIL) 20 MG tablet; Take 1 tablet (20 mg) by mouth daily  -     Basic metabolic panel  (Ca, Cl, CO2, Creat, Gluc, K, Na, BUN); Future  -     Adult Nephrology  Referral; Future  Hold on chlorthalidone- monitor bp. If elevated numbers contact clinic. Bmp in two weeks.     History of abnormal cervical Pap smear  -     Pap screen with HPV - recommended age 30 - 65 years    Hyperlipidemia with target LDL less than 130  -     atorvastatin (LIPITOR) 20 MG tablet; Take 1 tablet (20 mg) by mouth daily  Due to ascvd risk 16% started statin.   Heart heathy diet    Influenza vaccination declined    Mammogram declined    Other orders  -     PRIMARY CARE FOLLOW-UP SCHEDULING; Future              COUNSELING:  Reviewed preventive health counseling, as reflected in patient instructions      BMI:   Estimated body mass index is 25.97 kg/m  as calculated from the following:    Height as of this encounter: 1.499 m (4' 11\").    Weight as of this encounter: 58.3 kg (128 lb 9.6 oz).   Weight management plan: Discussed healthy diet and exercise guidelines      She reports that she has never smoked. She has never used smokeless tobacco.      Appropriate preventive services " were discussed with this patient, including applicable screening as appropriate for fall prevention, nutrition, physical activity, Tobacco-use cessation, weight loss and cognition.  Checklist reviewing preventive services available has been given to the patient.    Reviewed patients plan of care and provided an AVS. The Basic Care Plan (routine screening as documented in Health Maintenance) for Aaliyah meets the Care Plan requirement. This Care Plan has been established and reviewed with the Patient.          DEANA Camarillo [facility] ALEX    Identified Health Risks:  I have reviewed Opioid Use Disorder and Substance Use Disorder risk factors and made any needed referrals.

## 2023-11-16 NOTE — PATIENT INSTRUCTIONS
Patient Education   Personalized Prevention Plan  You are due for the preventive services outlined below.  Your care team is available to assist you in scheduling these services.  If you have already completed any of these items, please share that information with your care team to update in your medical record.  Health Maintenance Due   Topic Date Due     RSV VACCINE (Pregnancy & 60+) (1 - 1-dose 60+ series) Never done     Zoster (Shingles) Vaccine (2 of 3) 06/28/2013     Mammogram  10/27/2022     Diptheria Tetanus Pertussis (DTAP/TDAP/TD) Vaccine (2 - Td or Tdap) 06/14/2023     Flu Vaccine (1) 09/01/2023     COVID-19 Vaccine (3 - 2023-24 season) 09/01/2023     PAP  09/16/2023     HPV Follow Up  09/16/2023     ANNUAL REVIEW OF HM ORDERS  10/07/2023

## 2023-11-17 ENCOUNTER — MYC MEDICAL ADVICE (OUTPATIENT)
Dept: PEDIATRICS | Facility: CLINIC | Age: 73
End: 2023-11-17
Payer: MEDICARE

## 2023-11-17 DIAGNOSIS — L98.9 SKIN ABNORMALITIES: Primary | ICD-10-CM

## 2023-11-17 NOTE — TELEPHONE ENCOUNTER
Called and spoke with patient.  Informed patient we did [facility] - gave her phone number to call.  Informed her if she cannot get in there within a time frame she is comfortable with that she should call her insurance and find out what clinics are covered, call them and find out if she can get in sooner, then notify us of what clinic so we can place referral.  Patient agreed to plan.  Laura Bridges, CMA

## 2023-11-17 NOTE — TELEPHONE ENCOUNTER
[facility]l [facility], she can call them directly to schedule. Ok to call insurance to get a list of derms and she can self call for sooner appt

## 2023-11-20 LAB
BKR LAB AP GYN ADEQUACY: NORMAL
BKR LAB AP GYN INTERPRETATION: NORMAL
BKR LAB AP HPV REFLEX: NORMAL
BKR LAB AP PREVIOUS ABNORMAL: NORMAL
PATH REPORT.COMMENTS IMP SPEC: NORMAL
PATH REPORT.COMMENTS IMP SPEC: NORMAL
PATH REPORT.RELEVANT HX SPEC: NORMAL

## 2023-11-22 LAB
HUMAN PAPILLOMA VIRUS 16 DNA: NEGATIVE
HUMAN PAPILLOMA VIRUS 18 DNA: NEGATIVE
HUMAN PAPILLOMA VIRUS FINAL DIAGNOSIS: NORMAL
HUMAN PAPILLOMA VIRUS OTHER HR: NEGATIVE

## 2023-12-04 ENCOUNTER — LAB (OUTPATIENT)
Dept: LAB | Facility: CLINIC | Age: 73
End: 2023-12-04
Payer: MEDICARE

## 2023-12-04 DIAGNOSIS — N18.32 STAGE 3B CHRONIC KIDNEY DISEASE (H): ICD-10-CM

## 2023-12-04 LAB
ANION GAP SERPL CALCULATED.3IONS-SCNC: 11 MMOL/L (ref 7–15)
BUN SERPL-MCNC: 19.4 MG/DL (ref 8–23)
CALCIUM SERPL-MCNC: 10.4 MG/DL (ref 8.8–10.2)
CHLORIDE SERPL-SCNC: 101 MMOL/L (ref 98–107)
CREAT SERPL-MCNC: 1.25 MG/DL (ref 0.51–0.95)
DEPRECATED HCO3 PLAS-SCNC: 26 MMOL/L (ref 22–29)
EGFRCR SERPLBLD CKD-EPI 2021: 46 ML/MIN/1.73M2
GLUCOSE SERPL-MCNC: 96 MG/DL (ref 70–99)
POTASSIUM SERPL-SCNC: 4.9 MMOL/L (ref 3.4–5.3)
SODIUM SERPL-SCNC: 138 MMOL/L (ref 135–145)

## 2023-12-04 PROCEDURE — 36415 COLL VENOUS BLD VENIPUNCTURE: CPT

## 2023-12-04 PROCEDURE — 80048 BASIC METABOLIC PNL TOTAL CA: CPT

## 2024-01-15 ENCOUNTER — ANCILLARY ORDERS (OUTPATIENT)
Dept: ULTRASOUND IMAGING | Facility: CLINIC | Age: 74
End: 2024-01-15

## 2024-01-15 ENCOUNTER — TELEPHONE (OUTPATIENT)
Dept: PEDIATRICS | Facility: CLINIC | Age: 74
End: 2024-01-15
Payer: MEDICARE

## 2024-01-15 ENCOUNTER — TRANSFERRED RECORDS (OUTPATIENT)
Dept: HEALTH INFORMATION MANAGEMENT | Facility: CLINIC | Age: 74
End: 2024-01-15
Payer: MEDICARE

## 2024-01-15 DIAGNOSIS — N18.30 CHRONIC KIDNEY DISEASE, STAGE III (MODERATE) (H): Primary | ICD-10-CM

## 2024-01-15 NOTE — TELEPHONE ENCOUNTER
Prior Authorization Retail Medication Request    Medication/Dose: hydrOXYzine (ATARAX) 25 MG tablet  Diagnosis and ICD code (if different than what is on RX):  [F39]   New/renewal/insurance change PA/secondary ins. PA:  Previously Tried and Failed:  n/a  Rationale:  [F39]     Insurance   Primary: Medicare  Insurance ID:  [id number]    Secondary (if applicable): [facility]  Insurance ID:  [id number]    Pharmacy Information (if different than what is on RX)  Name:  [facility]   Phone:  [phone]  Fax:[phone]

## 2024-01-18 ENCOUNTER — HOSPITAL ENCOUNTER (OUTPATIENT)
Dept: ULTRASOUND IMAGING | Facility: CLINIC | Age: 74
Discharge: HOME | End: 2024-01-18
Attending: PHYSICIAN ASSISTANT | Admitting: PHYSICIAN ASSISTANT
Payer: MEDICARE

## 2024-01-18 DIAGNOSIS — N18.30 CHRONIC KIDNEY DISEASE, STAGE III (MODERATE) (H): ICD-10-CM

## 2024-01-18 PROCEDURE — 76770 US EXAM ABDO BACK WALL COMP: CPT

## 2024-01-23 NOTE — TELEPHONE ENCOUNTER
Prior Authorization Approval    Authorization Effective Date: 1/1/2024  Authorization Expiration Date: 1/23/2025  Medication: hydrOXYzine (ATARAX) 25 MG tablet  Approved Dose/Quantity:    Reference #:     Insurance Company: [employer] - Phone [phone] Fax [phone]  Expected CoPay:       CoPay Card Available:      Foundation Assistance Needed:    Which Pharmacy is filling the prescription (Not needed for infusion/clinic administered): [facility] [address] [facility] [address] [place] [address]  Pharmacy Notified:  Yes  Patient Notified:  **Instructed pharmacy to notify patient when script is ready to /ship.**

## 2024-01-23 NOTE — TELEPHONE ENCOUNTER
Central Prior Authorization Team   Phone: [phone]    PA Initiation    Medication: hydrOXYzine (ATARAX) 25 MG tablet  Insurance Company: [employer] - Phone [phone] Fax [phone]  Pharmacy Filling the Rx: [facility] [address] [place] [address]  Filling Pharmacy Phone: [phone]  Filling Pharmacy Fax:    Start Date: 1/23/2024

## 2024-02-07 ENCOUNTER — TRANSFERRED RECORDS (OUTPATIENT)
Dept: HEALTH INFORMATION MANAGEMENT | Facility: CLINIC | Age: 74
End: 2024-02-07
Payer: MEDICARE

## 2024-03-08 ENCOUNTER — TRANSFERRED RECORDS (OUTPATIENT)
Dept: HEALTH INFORMATION MANAGEMENT | Facility: CLINIC | Age: 74
End: 2024-03-08
Payer: MEDICARE

## 2024-03-22 ENCOUNTER — TRANSFERRED RECORDS (OUTPATIENT)
Dept: HEALTH INFORMATION MANAGEMENT | Facility: CLINIC | Age: 74
End: 2024-03-22
Payer: MEDICARE

## 2024-05-01 ENCOUNTER — TRANSFERRED RECORDS (OUTPATIENT)
Dept: HEALTH INFORMATION MANAGEMENT | Facility: CLINIC | Age: 74
End: 2024-05-01
Payer: MEDICARE

## 2024-06-26 ENCOUNTER — TRANSFERRED RECORDS (OUTPATIENT)
Dept: HEALTH INFORMATION MANAGEMENT | Facility: CLINIC | Age: 74
End: 2024-06-26
Payer: MEDICARE

## 2024-06-30 ENCOUNTER — HEALTH MAINTENANCE LETTER (OUTPATIENT)
Age: 74
End: 2024-06-30

## 2024-07-12 ENCOUNTER — TRANSFERRED RECORDS (OUTPATIENT)
Dept: HEALTH INFORMATION MANAGEMENT | Facility: CLINIC | Age: 74
End: 2024-07-12
Payer: COMMERCIAL

## 2024-08-21 DIAGNOSIS — I10 BENIGN ESSENTIAL HYPERTENSION: ICD-10-CM

## 2024-08-22 RX ORDER — ATENOLOL 25 MG/1
12.5 TABLET ORAL DAILY
Qty: 45 TABLET | Refills: 1 | Status: SHIPPED | OUTPATIENT
Start: 2024-08-22

## 2024-08-22 NOTE — TELEPHONE ENCOUNTER
Patient returns call. Has been taking this daily, has not missed any doses. Routing to PCP to address refill.    Crys Mccormack RN on 8/22/2024 at 12:50 PM

## 2024-11-13 SDOH — HEALTH STABILITY: PHYSICAL HEALTH: ON AVERAGE, HOW MANY DAYS PER WEEK DO YOU ENGAGE IN MODERATE TO STRENUOUS EXERCISE (LIKE A BRISK WALK)?: 5 DAYS

## 2024-11-13 SDOH — HEALTH STABILITY: PHYSICAL HEALTH: ON AVERAGE, HOW MANY MINUTES DO YOU ENGAGE IN EXERCISE AT THIS LEVEL?: 120 MIN

## 2024-11-13 ASSESSMENT — SOCIAL DETERMINANTS OF HEALTH (SDOH): HOW OFTEN DO YOU GET TOGETHER WITH FRIENDS OR RELATIVES?: ONCE A WEEK

## 2024-11-14 DIAGNOSIS — E78.5 HYPERLIPIDEMIA WITH TARGET LDL LESS THAN 130: ICD-10-CM

## 2024-11-14 RX ORDER — ATORVASTATIN CALCIUM 20 MG/1
20 TABLET, FILM COATED ORAL DAILY
Qty: 90 TABLET | Refills: 0 | Status: SHIPPED | OUTPATIENT
Start: 2024-11-14

## 2024-11-15 ENCOUNTER — LAB (OUTPATIENT)
Dept: LAB | Facility: CLINIC | Age: 74
End: 2024-11-15
Payer: MEDICARE

## 2024-11-15 DIAGNOSIS — Z13.220 LIPID SCREENING: ICD-10-CM

## 2024-11-15 DIAGNOSIS — I10 BENIGN ESSENTIAL HYPERTENSION: ICD-10-CM

## 2024-11-15 DIAGNOSIS — N18.31 CHRONIC KIDNEY DISEASE, STAGE 3A (H): ICD-10-CM

## 2024-11-15 DIAGNOSIS — Z13.1 SCREENING FOR DIABETES MELLITUS: ICD-10-CM

## 2024-11-15 LAB
ALBUMIN SERPL BCG-MCNC: 4.7 G/DL (ref 3.5–5.2)
ALP SERPL-CCNC: 62 U/L (ref 40–150)
ALT SERPL W P-5'-P-CCNC: 18 U/L (ref 0–50)
ANION GAP SERPL CALCULATED.3IONS-SCNC: 13 MMOL/L (ref 7–15)
AST SERPL W P-5'-P-CCNC: 32 U/L (ref 0–45)
BILIRUB SERPL-MCNC: 0.5 MG/DL
BUN SERPL-MCNC: 28.5 MG/DL (ref 8–23)
CALCIUM SERPL-MCNC: 10.4 MG/DL (ref 8.8–10.4)
CHLORIDE SERPL-SCNC: 101 MMOL/L (ref 98–107)
CHOLEST SERPL-MCNC: 208 MG/DL
CREAT SERPL-MCNC: 1.47 MG/DL (ref 0.51–0.95)
CREAT UR-MCNC: 146 MG/DL
EGFRCR SERPLBLD CKD-EPI 2021: 38 ML/MIN/1.73M2
EST. AVERAGE GLUCOSE BLD GHB EST-MCNC: 108 MG/DL
FASTING STATUS PATIENT QL REPORTED: YES
FASTING STATUS PATIENT QL REPORTED: YES
GLUCOSE SERPL-MCNC: 93 MG/DL (ref 70–99)
HBA1C MFR BLD: 5.4 % (ref 0–5.6)
HCO3 SERPL-SCNC: 23 MMOL/L (ref 22–29)
HDLC SERPL-MCNC: 59 MG/DL
HGB BLD-MCNC: 12.3 G/DL (ref 11.7–15.7)
LDLC SERPL CALC-MCNC: 114 MG/DL
MICROALBUMIN UR-MCNC: <12 MG/L
MICROALBUMIN/CREAT UR: NORMAL MG/G{CREAT}
NONHDLC SERPL-MCNC: 149 MG/DL
POTASSIUM SERPL-SCNC: 5.4 MMOL/L (ref 3.4–5.3)
PROT SERPL-MCNC: 7.5 G/DL (ref 6.4–8.3)
SODIUM SERPL-SCNC: 137 MMOL/L (ref 135–145)
TRIGL SERPL-MCNC: 177 MG/DL

## 2024-11-18 ENCOUNTER — OFFICE VISIT (OUTPATIENT)
Dept: PEDIATRICS | Facility: CLINIC | Age: 74
End: 2024-11-18
Attending: PHYSICIAN ASSISTANT
Payer: MEDICARE

## 2024-11-18 VITALS
HEIGHT: 60 IN | RESPIRATION RATE: 19 BRPM | BODY MASS INDEX: 26.97 KG/M2 | WEIGHT: 137.4 LBS | OXYGEN SATURATION: 95 % | HEART RATE: 68 BPM | SYSTOLIC BLOOD PRESSURE: 139 MMHG | DIASTOLIC BLOOD PRESSURE: 84 MMHG | TEMPERATURE: 98.2 F

## 2024-11-18 DIAGNOSIS — Z00.00 ROUTINE GENERAL MEDICAL EXAMINATION AT A HEALTH CARE FACILITY: Primary | ICD-10-CM

## 2024-11-18 DIAGNOSIS — E78.2 MIXED HYPERLIPIDEMIA: ICD-10-CM

## 2024-11-18 DIAGNOSIS — N18.31 CHRONIC KIDNEY DISEASE, STAGE 3A (H): ICD-10-CM

## 2024-11-18 DIAGNOSIS — Z86.19 H/O HERPETIC WHITLOW: ICD-10-CM

## 2024-11-18 DIAGNOSIS — I10 BENIGN ESSENTIAL HYPERTENSION: ICD-10-CM

## 2024-11-18 DIAGNOSIS — F39 MOOD DISORDER (H): ICD-10-CM

## 2024-11-18 DIAGNOSIS — Z12.31 ENCOUNTER FOR SCREENING MAMMOGRAM FOR BREAST CANCER: ICD-10-CM

## 2024-11-18 DIAGNOSIS — Z78.0 ASYMPTOMATIC MENOPAUSE: ICD-10-CM

## 2024-11-18 DIAGNOSIS — H02.403 PTOSIS OF BOTH EYELIDS: ICD-10-CM

## 2024-11-18 LAB
ANION GAP SERPL CALCULATED.3IONS-SCNC: 11 MMOL/L (ref 7–15)
BUN SERPL-MCNC: 23.8 MG/DL (ref 8–23)
CALCIUM SERPL-MCNC: 10.3 MG/DL (ref 8.8–10.4)
CHLORIDE SERPL-SCNC: 103 MMOL/L (ref 98–107)
CREAT SERPL-MCNC: 1.2 MG/DL (ref 0.51–0.95)
EGFRCR SERPLBLD CKD-EPI 2021: 48 ML/MIN/1.73M2
GLUCOSE SERPL-MCNC: 93 MG/DL (ref 70–99)
HCO3 SERPL-SCNC: 26 MMOL/L (ref 22–29)
POTASSIUM SERPL-SCNC: 5.5 MMOL/L (ref 3.4–5.3)
PTH-INTACT SERPL-MCNC: 32 PG/ML (ref 15–65)
SODIUM SERPL-SCNC: 140 MMOL/L (ref 135–145)

## 2024-11-18 PROCEDURE — G0439 PPPS, SUBSEQ VISIT: HCPCS | Performed by: INTERNAL MEDICINE

## 2024-11-18 PROCEDURE — 80048 BASIC METABOLIC PNL TOTAL CA: CPT | Performed by: INTERNAL MEDICINE

## 2024-11-18 PROCEDURE — 83970 ASSAY OF PARATHORMONE: CPT | Performed by: INTERNAL MEDICINE

## 2024-11-18 PROCEDURE — 36415 COLL VENOUS BLD VENIPUNCTURE: CPT | Performed by: INTERNAL MEDICINE

## 2024-11-18 PROCEDURE — 99214 OFFICE O/P EST MOD 30 MIN: CPT | Mod: 25 | Performed by: INTERNAL MEDICINE

## 2024-11-18 RX ORDER — ATORVASTATIN CALCIUM 40 MG/1
40 TABLET, FILM COATED ORAL DAILY
Qty: 90 TABLET | Refills: 3 | Status: SHIPPED | OUTPATIENT
Start: 2024-11-18

## 2024-11-18 RX ORDER — VALACYCLOVIR HYDROCHLORIDE 1 G/1
1000 TABLET, FILM COATED ORAL DAILY PRN
Qty: 30 TABLET | Refills: 3 | Status: SHIPPED | OUTPATIENT
Start: 2024-11-18

## 2024-11-18 RX ORDER — ATENOLOL 25 MG/1
12.5 TABLET ORAL DAILY
Qty: 45 TABLET | Refills: 3 | Status: SHIPPED | OUTPATIENT
Start: 2024-11-18

## 2024-11-18 RX ORDER — ATORVASTATIN CALCIUM 20 MG/1
20 TABLET, FILM COATED ORAL DAILY
Qty: 90 TABLET | Refills: 0 | Status: CANCELLED | OUTPATIENT
Start: 2024-11-18

## 2024-11-18 RX ORDER — LISINOPRIL 30 MG/1
30 TABLET ORAL DAILY
Qty: 90 TABLET | Refills: 3 | Status: SHIPPED | OUTPATIENT
Start: 2024-11-18 | End: 2024-11-21

## 2024-11-18 RX ORDER — ACETAMINOPHEN 325 MG/1
325 TABLET ORAL EVERY 4 HOURS PRN
Qty: 100 TABLET | Refills: 0 | Status: CANCELLED | OUTPATIENT
Start: 2024-11-18

## 2024-11-18 NOTE — PATIENT INSTRUCTIONS
Ask your pharmacist about shingles and tetanus vaccines.    Schedule mammogram and bone density.     Medications refilled. Increase atorvastatin to 40mg daily and follow-up labs in 3 months.     Repeat kidney function and parathyroid hormone today.     Schedule with ophthalmology for your droopy eyelids.

## 2024-11-18 NOTE — PROGRESS NOTES
Preventive Care Visit  [facility] ALEX Umanzor MD, Internal Medicine - Pediatrics  Nov 18, 2024      Assessment & Plan     Routine general medical examination at a health care facility  Counseling as below, declines flu and COVID vaccines today.     Benign essential hypertension  BP well controlled on current regimen, prefers to continue on atenolol at this time. Home BPs are much lower than what was documented here today in clinic.   - atenolol (TENORMIN) 25 MG tablet; Take 0.5 tablets (12.5 mg) by mouth daily.  - lisinopril (ZESTRIL) 30 MG tablet; Take 1 tablet (30 mg) by mouth daily.    Mood disorder (H)  Stable, doing well off medications.     Chronic kidney disease, stage 3a (H)  Has previously seen nephrology; recent slight worsening in creatinine. Will recheck levels today as she had been fasting and exercising before last blood draw.   - lisinopril (ZESTRIL) 30 MG tablet; Take 1 tablet (30 mg) by mouth daily.  - Basic metabolic panel  (Ca, Cl, CO2, Creat, Gluc, K, Na, BUN)  - Parathyroid Hormone Intact    H/O herpetic ritesh  Uses valtrex prn with good results during flares.   - valACYclovir (VALTREX) 1000 mg tablet; Take 1 tablet (1,000 mg) by mouth daily as needed (herpetic ritesh flare).    Mixed hyperlipidemia  LDL still somewhat elevate, will increase dose of atorvastatin to 40mg daily.   - atorvastatin (LIPITOR) 40 MG tablet; Take 1 tablet (40 mg) by mouth daily.  - Lipid panel reflex to direct LDL Fasting; Future    Encounter for screening mammogram for breast cancer  - MA Screening Bilateral w/ Bao; Future    Asymptomatic menopause  - DX Bone Density; Future    Ptosis of both eyelids  Interested in discussing ptosis surgery with ophthalmology, referral placed.   - Adult Eye  Referral; Future            BMI  Estimated body mass index is 26.83 kg/m  as calculated from the following:    Height as of this encounter: 1.524 m (5').    Weight as of this encounter: 62.3 kg  (137 lb 6.4 oz).       Counseling  Appropriate preventive services were addressed with this patient via screening, questionnaire, or discussion as appropriate for fall prevention, nutrition, physical activity, Tobacco-use cessation, social engagement, weight loss and cognition.  Checklist reviewing preventive services available has been given to the patient.  Reviewed patient's diet, addressing concerns and/or questions.   She is at risk for psychosocial distress and has been provided with information to reduce risk.   Information on urinary incontinence and treatment options given to patient.       Patient Instructions   Ask your pharmacist about shingles and tetanus vaccines.    Schedule mammogram and bone density.     Medications refilled. Increase atorvastatin to 40mg daily and follow-up labs in 3 months.     Repeat kidney function and parathyroid hormone today.     Schedule with ophthalmology for your droopy eyelids.     Rangel Fischer is a 74 year old, presenting for the following:  Wellness Visit ([facility] MEDICARE ANNUAL WELLNESS )            HPI  Patient reports that her BPs at home are 100-120s/70-80s.     Worried about recent labs - had been fasting and had just completed intense exercise before getting these drawn.     Otherwise feeling well, trying to stay active.     Would like referral to ophthalmology to discuss getting her eyelids raised as it's affecting her vision.    Health Care Directive  Patient does not have a Health Care Directive: Discussed advance care planning with patient; however, patient declined at this time.      11/13/2024   General Health   How would you rate your overall physical health? Good   Feel stress (tense, anxious, or unable to sleep) To some extent      (!) STRESS CONCERN      11/13/2024   Nutrition   Diet: Other   If other, please elaborate: No dairy because of high blood calcium            11/13/2024   Exercise   Days per week of moderate/strenous exercise 5 days    Average minutes spent exercising at this level 120 min            11/13/2024   Social Factors   Frequency of gathering with friends or relatives Once a week   Worry food won't last until get money to buy more No   Food not last or not have enough money for food? No   Do you have housing? (Housing is defined as stable permanent housing and does not include staying ouside in a car, in a tent, in an abandoned building, in an overnight shelter, or couch-surfing.) Yes   Are you worried about losing your housing? No   Lack of transportation? No   Unable to get utilities (heat,electricity)? No            11/13/2024   Fall Risk   Fallen 2 or more times in the past year? No     No    Trouble with walking or balance? No     No        Patient-reported    Multiple values from one day are sorted in reverse-chronological order          11/13/2024   Activities of Daily Living- Home Safety   Needs help with the following daily activites None of the above   Safety concerns in the home None of the above            11/13/2024   Dental   Dentist two times every year? Yes            11/13/2024   Hearing Screening   Hearing concerns? None of the above            11/13/2024   Driving Risk Screening   Patient/family members have concerns about driving No            11/13/2024   General Alertness/Fatigue Screening   Have you been more tired than usual lately? No            11/13/2024   Urinary Incontinence Screening   Bothered by leaking urine in past 6 months Yes            11/13/2024   TB Screening   Were you born outside of the [place]? No            Today's PHQ-2 Score:       11/17/2024    11:34 AM   PHQ-2 ( 1999 [employer])   Q1: Little interest or pleasure in doing things 0    Q2: Feeling down, depressed or hopeless 0    PHQ-2 Score 0    Q1: Little interest or pleasure in doing things Not at all   Q2: Feeling down, depressed or hopeless Not at all   PHQ-2 Score 0       Patient-reported           11/13/2024   Substance Use   Alcohol more than  3/day or more than 7/wk No   Do you have a current opioid prescription? No   How severe/bad is pain from 1 to 10? 6/10   Do you use any other substances recreationally? No        Social History     Tobacco Use    Smoking status: Never    Smokeless tobacco: Never   Vaping Use    Vaping status: Never Used   Substance Use Topics    Alcohol use: Yes     Alcohol/week: 0.0 standard drinks of alcohol     Comment: 1 glass of wine per night    Drug use: No           10/27/2021   LAST FHS-7 RESULTS   1st degree relative breast or ovarian cancer No   Any relative bilateral breast cancer No   Any male have breast cancer No   Any ONE woman have BOTH breast AND ovarian cancer No   Any woman with breast cancer before 50yrs No   2 or more relatives with breast AND/OR ovarian cancer No   2 or more relatives with breast AND/OR bowel cancer No           Mammogram Screening - Mammogram every 1-2 years updated in Health Maintenance based on mutual decision making    ASCVD Risk   The ASCVD Risk score (Nicci LEHMAN, et al., 2019) failed to calculate for the following reasons:    The systolic blood pressure is missing            Reviewed and updated as needed this visit by Provider     Meds                Patient Active Problem List   Diagnosis    Benign essential hypertension    Insomnia    Allergic rhinitis due to other allergen    Atypical squamous cells cannot exclude high grade squamous intraepithelial lesion on cytologic smear of cervix (ASC-H)    Osteoarthritis of multiple joints    Status post total hip replacement, left    Herpetic ritesh    History of bowel resection    S/p reverse total shoulder arthroplasty    Mood disorder (H)    Chronic kidney disease, stage 3a (H)     Past Surgical History:   Procedure Laterality Date    APPENDECTOMY  2012    COLECTOMY LEFT N/A 1/6/2017    Procedure: COLECTOMY LEFT;  Surgeon: David Gaitan MD;  Location: [facility] OR    COLONOSCOPY N/A 1/6/2017    Procedure: COLONOSCOPY;  Surgeon:  Regis Mckoy MD;  Location: [facility] GI    LAPAROSCOPIC ASSISTED SIGMOID COLECTOMY N/A 1/6/2017    Procedure: LAPAROSCOPIC ASSISTED SIGMOID COLECTOMY;  Surgeon: David Gaitan MD;  Location: [facility] OR    ORTHOPEDIC SURGERY      REVERSE ARTHROPLASTY SHOULDER Left 11/20/2018    Procedure: Left reverse total shoulder arthroplasty;  Surgeon: Cricket Shah MD;  Location: [facility] OR    REVERSE ARTHROPLASTY SHOULDER Right 3/8/2023    Procedure: Right reverse total shoulder arthroplasty;  Surgeon: Cricket Shah MD;  Location: [facility] OR    SURGICAL HISTORY OF -   09/2010    Rotator cuff repair-right shoulder    SURGICAL HISTORY OF -   12/2011    Right hip replacement    SURGICAL HISTORY OF -   2005    ACL and MCL left knee       Social History     Tobacco Use    Smoking status: Never    Smokeless tobacco: Never   Substance Use Topics    Alcohol use: Yes     Alcohol/week: 0.0 standard drinks of alcohol     Comment: 1 glass of wine per night     Family History   Problem Relation Age of Onset    Diabetes Mother 84        Type 2    Hypertension Mother     Alcohol/Drug Father     C.A.D. Paternal Grandmother     Colon Cancer No family hx of     Glaucoma No family hx of     Macular Degeneration No family hx of          Current providers sharing in care for this patient include:  Patient Care Team:  Yesi Morrison MD as PCP - General (Internal Medicine - Pediatrics)  Nanette Stark AuD as Audiologist (Audiology)  Raheel Luo MD as Assigned Surgical Provider  Maria Alejandra Lennon PA-C as Assigned PCP    The following health maintenance items are reviewed in Epic and correct as of today:  Health Maintenance   Topic Date Due    ZOSTER IMMUNIZATION (2 of 3) 06/28/2013    MAMMO SCREENING  10/27/2022    DTAP/TDAP/TD IMMUNIZATION (2 - Td or Tdap) 06/14/2023    ANNUAL REVIEW OF HM ORDERS  10/07/2023    INFLUENZA VACCINE (1) 09/01/2024    COVID-19 Vaccine (3 - 2024-25 season) 09/01/2024    DEXA  11/13/2024    MEDICARE ANNUAL WELLNESS VISIT   "11/16/2024    A1C  11/15/2025    BMP  11/15/2025    CMP  11/15/2025    LIPID  11/15/2025    MICROALBUMIN  11/15/2025    HEMOGLOBIN  11/15/2025    FALL RISK ASSESSMENT  11/18/2025    PAP FOLLOW-UP  11/16/2026    HPV FOLLOW-UP  11/16/2026    COLORECTAL CANCER SCREENING  01/06/2027    RSV VACCINE (1 - 1-dose 75+ series) 01/28/2027    GLUCOSE  11/15/2027    ADVANCE CARE PLANNING  11/16/2028    HEPATITIS C SCREENING  Completed    PHQ-2 (once per calendar year)  Completed    Pneumococcal Vaccine: 65+ Years  Completed    URINALYSIS  Completed    HPV IMMUNIZATION  Aged Out    MENINGITIS IMMUNIZATION  Aged Out    RSV MONOCLONAL ANTIBODY  Aged Out         Review of Systems  Constitutional, neuro, ENT, endocrine, pulmonary, cardiac, gastrointestinal, genitourinary, musculoskeletal, integument and psychiatric systems are negative, except as otherwise noted.     Objective    Exam  /84 (BP Location: Right arm, Patient Position: Sitting, Cuff Size: Adult Regular)   Pulse 68   Temp 98.2  F (36.8  C) (Temporal)   Resp 19   Ht 1.524 m (5')   Wt 62.3 kg (137 lb 6.4 oz)   SpO2 95%   BMI 26.83 kg/m     Estimated body mass index is 25.97 kg/m  as calculated from the following:    Height as of 11/16/23: 1.499 m (4' 11\").    Weight as of 11/16/23: 58.3 kg (128 lb 9.6 oz).    Physical Exam  GENERAL: alert and no distress  EYES: Eyes grossly normal to inspection, PERRL and conjunctivae and sclerae normal  HENT: ear canals and TM's normal, nose and mouth without ulcers or lesions  NECK: no adenopathy, no asymmetry, masses, or scars  RESP: lungs clear to auscultation - no rales, rhonchi or wheezes  CV: regular rate and rhythm, normal S1 S2, no S3 or S4, no murmur, click or rub, no peripheral edema  ABDOMEN: soft, nontender, no hepatosplenomegaly, no masses and bowel sounds normal  MS: no gross musculoskeletal defects noted, no edema  SKIN: no suspicious lesions or rashes  NEURO: Normal strength and tone, mentation intact and " speech normal  PSYCH: mentation appears normal, affect normal/bright        11/18/2024   Mini Cog   Clock Draw Score 2 Normal   3 Item Recall 3 objects recalled   Mini Cog Total Score 5                 Signed Electronically by: Yesi Umanzor MD

## 2024-11-21 DIAGNOSIS — N18.31 CHRONIC KIDNEY DISEASE, STAGE 3A (H): ICD-10-CM

## 2024-11-21 DIAGNOSIS — E87.5 HYPERKALEMIA: ICD-10-CM

## 2024-11-21 DIAGNOSIS — I10 BENIGN ESSENTIAL HYPERTENSION: Primary | ICD-10-CM

## 2024-11-21 RX ORDER — LISINOPRIL 20 MG/1
20 TABLET ORAL DAILY
Qty: 90 TABLET | Refills: 1 | Status: SHIPPED | OUTPATIENT
Start: 2024-11-21

## 2024-11-21 RX ORDER — AMLODIPINE BESYLATE 5 MG/1
5 TABLET ORAL DAILY
Qty: 90 TABLET | Refills: 1 | Status: SHIPPED | OUTPATIENT
Start: 2024-11-21

## 2024-12-05 ENCOUNTER — ALLIED HEALTH/NURSE VISIT (OUTPATIENT)
Dept: PEDIATRICS | Facility: CLINIC | Age: 74
End: 2024-12-05
Payer: MEDICARE

## 2024-12-05 ENCOUNTER — LAB (OUTPATIENT)
Dept: LAB | Facility: CLINIC | Age: 74
End: 2024-12-05
Payer: MEDICARE

## 2024-12-05 VITALS — SYSTOLIC BLOOD PRESSURE: 129 MMHG | OXYGEN SATURATION: 100 % | HEART RATE: 71 BPM | DIASTOLIC BLOOD PRESSURE: 75 MMHG

## 2024-12-05 DIAGNOSIS — E87.5 HYPERKALEMIA: ICD-10-CM

## 2024-12-05 DIAGNOSIS — Z01.30 BP CHECK: Primary | ICD-10-CM

## 2024-12-05 PROCEDURE — 80048 BASIC METABOLIC PNL TOTAL CA: CPT

## 2024-12-05 PROCEDURE — 36415 COLL VENOUS BLD VENIPUNCTURE: CPT

## 2024-12-05 NOTE — PROGRESS NOTES
BP looks good, however if she is having issues with amlodipine we could have her stop it and consider increasing atenolol slightly to 25mg. Would keep lisinopril dose the same for now pending lab work.    Yesi Morrison MD  Internal Medicine-Pediatrics

## 2024-12-05 NOTE — PROGRESS NOTES
Aaliyah Hayes is a 74 year old year old patient who comes in today for a Blood Pressure check because of ongoing blood pressure monitoring.  Vital Signs as repeated by /65  Patient is taking medication as prescribed lisinopril 20 mg mlodipine 5 mg, atenolol 12.5 mg  Patient is not tolerating medications well. States believes amlodipine is worsening rash/dry skin; has mild swelling to bilateral calves but no pain  Patient is monitoring Blood Pressure at home.  Average readings if yes are 116/63, checks every other  Current complaints: mild swelling or edema to bilateral calves and fatigue  Disposition: patient discharged and informed provider will review BP and skin condition. Advised PCP may request e-visit for rash. Patient agreeable.    Patient reporting rash on bilateral calves, and slightly on arms and back. States was there due to swimming but seems to be exacerbated since starting the atenolol. States she follows a washing and moisturizing regimen for the pool, but it does not seem to help the rash. Rash is pink, no open sores, pain, raised bumps, fever, or redness around rash. Patient requesting prednisone. Per micormedex amlodipine does not have side effect of a rash. Informed patient e-visit may be requested. Patient agreeable.    Dr. Morrison please review and advise.    Fe Taylor RN

## 2024-12-05 NOTE — PROGRESS NOTES
Called patient at [phone] and left voice message for patient to call [phone] and ask to speak to a nurse.     Upon call back please relay provider recommendation:    Huddled with PCP. Stated if patient wants to stop amlodipine due to possible association with the rash, she may stop the amlodipine for now and increase the atenolol to 25 mg. States if she does not want to stop the amlodipine, her BP is great, and she may wait and watch or submit an e-visit.     States patient may  have to have medications changed after labs result due to potassium anyway, so she will also address then.     Awaiting call back at this time.    Fe Taylor RN

## 2024-12-05 NOTE — PROGRESS NOTES
"Incoming call from patient. Notified of Yesi Umanzor MD's message: \"Stated if patient wants to stop amlodipine due to possible association with the rash, she may stop the amlodipine for now and increase the atenolol to 25 mg. States if she does not want to stop the amlodipine, her BP is great, and she may wait and watch or submit an e-visit.   States patient may  have to have medications changed after labs result due to potassium anyway, so she will also address then.\"    Person was given an opportunity to ask questions, verbalized understanding, and is agreeable. For now, she will wait to see what her labs say and will make a decision once they result.    Renée Hager RN on 12/5/2024 at 3:36 PM      "

## 2024-12-06 ENCOUNTER — E-VISIT (OUTPATIENT)
Dept: PEDIATRICS | Facility: CLINIC | Age: 74
End: 2024-12-06
Payer: MEDICARE

## 2024-12-06 DIAGNOSIS — L30.9 DERMATITIS: Primary | ICD-10-CM

## 2024-12-06 LAB
ANION GAP SERPL CALCULATED.3IONS-SCNC: 14 MMOL/L (ref 7–15)
BUN SERPL-MCNC: 32.8 MG/DL (ref 8–23)
CALCIUM SERPL-MCNC: 10.1 MG/DL (ref 8.8–10.4)
CHLORIDE SERPL-SCNC: 103 MMOL/L (ref 98–107)
CREAT SERPL-MCNC: 1.4 MG/DL (ref 0.51–0.95)
EGFRCR SERPLBLD CKD-EPI 2021: 40 ML/MIN/1.73M2
GLUCOSE SERPL-MCNC: 91 MG/DL (ref 70–99)
HCO3 SERPL-SCNC: 21 MMOL/L (ref 22–29)
POTASSIUM SERPL-SCNC: 4.8 MMOL/L (ref 3.4–5.3)
SODIUM SERPL-SCNC: 138 MMOL/L (ref 135–145)

## 2025-01-03 ENCOUNTER — ANCILLARY PROCEDURE (OUTPATIENT)
Dept: BONE DENSITY | Facility: CLINIC | Age: 75
End: 2025-01-03
Payer: COMMERCIAL

## 2025-01-03 ENCOUNTER — ANCILLARY PROCEDURE (OUTPATIENT)
Dept: MAMMOGRAPHY | Facility: CLINIC | Age: 75
End: 2025-01-03
Payer: COMMERCIAL

## 2025-01-03 DIAGNOSIS — Z78.0 ASYMPTOMATIC MENOPAUSE: ICD-10-CM

## 2025-01-03 DIAGNOSIS — Z12.31 ENCOUNTER FOR SCREENING MAMMOGRAM FOR BREAST CANCER: ICD-10-CM

## 2025-01-03 PROCEDURE — 77067 SCR MAMMO BI INCL CAD: CPT | Mod: TC | Performed by: RADIOLOGY

## 2025-01-03 PROCEDURE — 77081 DXA BONE DENSITY APPENDICULR: CPT | Mod: TC | Performed by: RADIOLOGY

## 2025-01-03 PROCEDURE — 77080 DXA BONE DENSITY AXIAL: CPT | Mod: TC | Performed by: RADIOLOGY

## 2025-01-03 PROCEDURE — 77063 BREAST TOMOSYNTHESIS BI: CPT | Mod: TC | Performed by: RADIOLOGY

## 2025-01-07 NOTE — TELEPHONE ENCOUNTER
----- Message from Yesi Billy sent at 6/23/2017 11:26 AM CDT -----  Regarding: Appointment Needed  Contact: [phone]  Patient needs to schedule a colposcopy (see lab notes from Dr. High), call patient to schedule.  -Yesi Billy       .

## 2025-05-13 DIAGNOSIS — N18.31 CHRONIC KIDNEY DISEASE, STAGE 3A (H): ICD-10-CM

## 2025-05-13 DIAGNOSIS — I10 BENIGN ESSENTIAL HYPERTENSION: ICD-10-CM

## 2025-05-13 RX ORDER — LISINOPRIL 20 MG/1
20 TABLET ORAL DAILY
Qty: 90 TABLET | Refills: 1 | Status: SHIPPED | OUTPATIENT
Start: 2025-05-13

## 2025-05-13 RX ORDER — AMLODIPINE BESYLATE 5 MG/1
5 TABLET ORAL DAILY
Qty: 90 TABLET | Refills: 1 | Status: SHIPPED | OUTPATIENT
Start: 2025-05-13

## 2025-05-18 ENCOUNTER — OFFICE VISIT (OUTPATIENT)
Dept: URGENT CARE | Facility: URGENT CARE | Age: 75
End: 2025-05-18
Payer: COMMERCIAL

## 2025-05-18 VITALS
SYSTOLIC BLOOD PRESSURE: 116 MMHG | HEART RATE: 80 BPM | WEIGHT: 132 LBS | RESPIRATION RATE: 16 BRPM | BODY MASS INDEX: 25.91 KG/M2 | OXYGEN SATURATION: 99 % | DIASTOLIC BLOOD PRESSURE: 68 MMHG | TEMPERATURE: 98.2 F | HEIGHT: 60 IN

## 2025-05-18 DIAGNOSIS — R30.0 DYSURIA: ICD-10-CM

## 2025-05-18 DIAGNOSIS — Z87.448 HISTORY OF CHRONIC KIDNEY DISEASE: ICD-10-CM

## 2025-05-18 DIAGNOSIS — N30.01 ACUTE CYSTITIS WITH HEMATURIA: Primary | ICD-10-CM

## 2025-05-18 LAB
ALBUMIN UR-MCNC: 100 MG/DL
APPEARANCE UR: ABNORMAL
BACTERIA #/AREA URNS HPF: ABNORMAL /HPF
BILIRUB UR QL STRIP: NEGATIVE
COLOR UR AUTO: YELLOW
GLUCOSE UR STRIP-MCNC: NEGATIVE MG/DL
HGB UR QL STRIP: ABNORMAL
KETONES UR STRIP-MCNC: ABNORMAL MG/DL
LEUKOCYTE ESTERASE UR QL STRIP: ABNORMAL
NITRATE UR QL: NEGATIVE
PH UR STRIP: 6 [PH] (ref 5–7)
RBC #/AREA URNS AUTO: ABNORMAL /HPF
SP GR UR STRIP: 1.02 (ref 1–1.03)
SQUAMOUS #/AREA URNS AUTO: ABNORMAL /LPF
UROBILINOGEN UR STRIP-ACNC: 0.2 E.U./DL
WBC #/AREA URNS AUTO: >100 /HPF

## 2025-05-18 PROCEDURE — 81001 URINALYSIS AUTO W/SCOPE: CPT | Performed by: FAMILY MEDICINE

## 2025-05-18 PROCEDURE — 3078F DIAST BP <80 MM HG: CPT | Performed by: FAMILY MEDICINE

## 2025-05-18 PROCEDURE — 87086 URINE CULTURE/COLONY COUNT: CPT | Performed by: FAMILY MEDICINE

## 2025-05-18 PROCEDURE — 99214 OFFICE O/P EST MOD 30 MIN: CPT | Performed by: FAMILY MEDICINE

## 2025-05-18 PROCEDURE — 3074F SYST BP LT 130 MM HG: CPT | Performed by: FAMILY MEDICINE

## 2025-05-18 PROCEDURE — 87186 SC STD MICRODIL/AGAR DIL: CPT | Performed by: FAMILY MEDICINE

## 2025-05-18 RX ORDER — CEPHALEXIN 500 MG/1
500 CAPSULE ORAL 2 TIMES DAILY
Qty: 14 CAPSULE | Refills: 0 | Status: SHIPPED | OUTPATIENT
Start: 2025-05-18 | End: 2025-05-25

## 2025-05-18 NOTE — PROGRESS NOTES
SUBJECTIVE:   Aaliyah Hayes is a 75 year old female with Stage 3a Chronic Kidney Disease who  presents today for a possible UTI. Symptoms of dysuria (burning with urination), urinary frequency, cloudy urine, malodorous urine and lower abdominal pressure have been going on for the past two weeks.  Hematuria:  none..  There is no history of fever, chills, nausea or vomiting.   This patient does not have a history of frequent recurrent urinary tract infections.       Past Medical History:   Diagnosis Date    Arthritis     Atypical squamous cells cannot exclude high grade squamous intraepithelial lesion on cytologic smear of cervix (ASC-H) 06/09/2017 06/09/17 ASC-H, Neg HPV, Endometrial cells present. Granite= SELENE 1, EMB= Benign    Facial cellulitis 11/22/2015    HTN (hypertension)     Perforated sigmoid colon (H) 01/06/2017    Sleep apnea      Current Outpatient Medications   Medication Sig Dispense Refill    acetaminophen (TYLENOL) 325 MG tablet Take 1 tablet (325 mg) by mouth every 4 hours as needed for other (mild pain) 100 tablet 0    amLODIPine (NORVASC) 5 MG tablet Take 1 tablet by mouth once daily 90 tablet 1    Ascorbic Acid (VITAMIN C PO) Take 1,000 mg by mouth daily      atenolol (TENORMIN) 25 MG tablet Take 0.5 tablets (12.5 mg) by mouth daily. 45 tablet 3    atorvastatin (LIPITOR) 40 MG tablet Take 1 tablet (40 mg) by mouth daily. 90 tablet 3    diphenhydrAMINE (BENADRYL) 25 MG tablet Take 25 mg by mouth nightly as needed      lisinopril (ZESTRIL) 20 MG tablet Take 1 tablet by mouth once daily 90 tablet 1    multivitamin w/minerals (THERA-VIT-M) tablet Take 1 tablet by mouth every morning  100 tablet 3    valACYclovir (VALTREX) 1000 mg tablet Take 1 tablet (1,000 mg) by mouth daily as needed (herpetic ritesh flare). 30 tablet 3    vitamin B-Complex Take 1 tablet by mouth daily       Social History     Tobacco Use    Smoking status: Never    Smokeless tobacco: Never   Substance Use Topics    Alcohol  use: Yes     Alcohol/week: 0.0 standard drinks of alcohol     Comment: 1 glass of wine per night       ROS:   CONSTITUTIONAL:negative for fevers.   :  positive for dysuria, urinary frequency, cloudy urine, malodorous urine.     OBJECTIVE:  /68 (BP Location: Right arm, Patient Position: Sitting, Cuff Size: Adult Regular)   Pulse 80   Temp 98.2  F (36.8  C) (Oral)   Resp 16   Ht 1.524 m (5')   Wt 59.9 kg (132 lb)   SpO2 99%   BMI 25.78 kg/m    GENERAL APPEARANCE: healthy, alert and no distress  BACK: No CVA tenderness    LABS:    Results for orders placed or performed in visit on 05/18/25   UA Macroscopic with reflex to Microscopic and Culture - Clinic Collect     Status: Abnormal    Specimen: Urine, Clean Catch   Result Value Ref Range    Color Urine Yellow Colorless, Straw, Light Yellow, Yellow    Appearance Urine Cloudy (A) Clear    Glucose Urine Negative Negative mg/dL    Bilirubin Urine Negative Negative    Ketones Urine Trace (A) Negative mg/dL    Specific Gravity Urine 1.020 1.003 - 1.035    Blood Urine Large (A) Negative    pH Urine 6.0 5.0 - 7.0    Protein Albumin Urine 100 (A) Negative mg/dL    Urobilinogen Urine 0.2 0.2, 1.0 E.U./dL    Nitrite Urine Negative Negative    Leukocyte Esterase Urine Large (A) Negative   Urine Microscopic Exam     Status: Abnormal   Result Value Ref Range    Bacteria Urine Moderate (A) None Seen /HPF    RBC Urine 5-10 (A) 0-2 /HPF /HPF    WBC Urine >100 (A) 0-5 /HPF /HPF    Squamous Epithelials Urine Few (A) None Seen /LPF         ASSESSMENT:   Dysuria  Acute Cystitis with Hematuria  History of Chronic Kidney Disease.      PLAN:  Rx:  Cephalexin 500 mg PO BID x 7 days. (I calculated the estimated Creatinine Clearance based on the December 5, 2025, Creatinine level of 1.4 and her weight of 59.9 kilograms.  The Creatinine Clearance was 33.84 mL/min.  The cephalexin dose will not have to adjusted for Creatinine Clearance in the range of 30-59 ml/min.  However, the  maximum daily dose should exceed 1 gram per day.)    Follow up if any fevers/vomiting/kidney pain appears.      Follow up if not better in 3 days.      Pending lab:  Urine culture     Hadley Espana MD

## 2025-05-19 ENCOUNTER — RESULTS FOLLOW-UP (OUTPATIENT)
Dept: URGENT CARE | Facility: URGENT CARE | Age: 75
End: 2025-05-19

## 2025-05-19 LAB — BACTERIA UR CULT: ABNORMAL

## 2025-08-24 DIAGNOSIS — I10 BENIGN ESSENTIAL HYPERTENSION: ICD-10-CM

## 2025-08-25 RX ORDER — ATENOLOL 25 MG/1
12.5 TABLET ORAL DAILY
Qty: 45 TABLET | Refills: 0 | OUTPATIENT
Start: 2025-08-25

## (undated) DEVICE — LINEN FULL SHEET 5511

## (undated) DEVICE — GLOVE PROTEXIS POWDER FREE 6.5 ORTHOPEDIC 2D73ET65

## (undated) DEVICE — DRSG STERI STRIP 1/2X4" R1547

## (undated) DEVICE — BLADE SAW SAGITTAL STRK 18X90X1.27MM HD SYS 6 6118-127-090

## (undated) DEVICE — SU MONOCRYL 4-0 PS-2 27" UND Y426H

## (undated) DEVICE — SU FIBERWIRE 2 38" T-8 NDL  AR-7206

## (undated) DEVICE — SET HANDPIECE INTERPULSE W/COAXIAL FAN SPRAY TIP 0210118000

## (undated) DEVICE — SU VICRYL 2-0 CT-2 27" UND J269H

## (undated) DEVICE — SPONGE LAP 18X18" X8435

## (undated) DEVICE — ESU GROUND PAD ADULT W/CORD E7507

## (undated) DEVICE — GLOVE PROTEXIS BLUE W/NEU-THERA 7.0  2D73EB70

## (undated) DEVICE — GLOVE PROTEXIS BLUE W/NEU-THERA 8.5  2D73EB85

## (undated) DEVICE — SUCTION MANIFOLD NEPTUNE 2 SYS 4 PORT 0702-020-000

## (undated) DEVICE — SYR BULB IRRIG 50ML LATEX FREE 0035280

## (undated) DEVICE — Device

## (undated) DEVICE — BAG CLEAR TRASH 1.3M 39X33" P4040C

## (undated) DEVICE — LINEN ORTHO ACL PACK 5447

## (undated) DEVICE — BNDG ELASTIC 4"X5YDS UNSTERILE 6611-40

## (undated) DEVICE — 3.2MM DRILL BIT

## (undated) DEVICE — SUCTION TIP YANKAUER W/O VENT K86

## (undated) DEVICE — BLADE KNIFE SURG 10 371110

## (undated) DEVICE — HOOD FLYTE W/PEELAWAY 408-800-100

## (undated) DEVICE — GLOVE BIOGEL PI MICRO INDICATOR UNDERGLOVE SZ 8.5 48985

## (undated) DEVICE — SOL NACL 0.9% IRRIG 1000ML BOTTLE 2F7124

## (undated) DEVICE — DRSG AQUACEL AG HYDROFIBER  3.5X10" 422605

## (undated) DEVICE — BLUEPRINT REVERSED GLENOID GUIDE

## (undated) DEVICE — GLOVE PROTEXIS POWDER FREE 8.5 ORTHOPEDIC 2D73ET85

## (undated) DEVICE — DEVICE RETRIEVER HEWSON 71111579

## (undated) DEVICE — SU VICRYL 0 CT-1 27" J340H

## (undated) DEVICE — ESU PENCIL W/HOLSTER E2350H

## (undated) DEVICE — SOLUTION WOUND CLEANSING 3/4OZ 10% PVP EA-L3011FB-50

## (undated) DEVICE — IMM SHOULDER  ABDUCT ULTRASLING III MED 11-0449-3

## (undated) DEVICE — PREP CHLORAPREP 26ML TINTED ORANGE  260815

## (undated) DEVICE — DRSG AQUACEL AG 3.5X9.75" HYDROFIBER 412011

## (undated) DEVICE — PREP CHLORAPREP 26ML TINTED HI-LITE ORANGE 930815

## (undated) DEVICE — BLADE SAW SAGITTAL STRK 20.7X85X0.89MM 2108-109-000S13

## (undated) DEVICE — DRAPE IOBAN INCISE 23X17" 6650EZ

## (undated) DEVICE — GLOVE BIOGEL PI MICRO INDICATOR UNDERGLOVE SZ 6.5 48965

## (undated) DEVICE — GLOVE BIOGEL PI SZ 6.5 40865

## (undated) DEVICE — SU ETHIBOND 1 CT-1 30" X425H

## (undated) DEVICE — PACK SHOULDER RIDGES

## (undated) DEVICE — SU VICRYL 1 CT-1 27" J341H

## (undated) DEVICE — LINEN HALF SHEET 5512

## (undated) DEVICE — DRAPE CONVERTORS U-DRAPE 60X72" 8476

## (undated) DEVICE — ESU ELEC BLADE 6" COATED E1450-6

## (undated) DEVICE — DRSG KERLIX FLUFFS X5

## (undated) DEVICE — PACK SET-UP STD 9102

## (undated) DEVICE — GLOVE BIOGEL PI SZ 8.5 40885

## (undated) DEVICE — DRAPE SHOULDER PACK SPLITS 29365

## (undated) DEVICE — SU FIBERWIRE 5 CCS-1 BLUE  AR-7211

## (undated) DEVICE — SOL NACL 0.9% IRRIG 3000ML BAG 2B7477

## (undated) DEVICE — CATH TRAY FOLEY SURESTEP 16FR DRAIN BAG STATOCK A899916

## (undated) DEVICE — SU ETHIBOND 2 V-37 4X30" MX69G

## (undated) DEVICE — SU NDL MAYO 1824-4

## (undated) RX ORDER — PROPOFOL 10 MG/ML
INJECTION, EMULSION INTRAVENOUS
Status: DISPENSED
Start: 2018-11-20

## (undated) RX ORDER — GLYCOPYRROLATE 0.2 MG/ML
INJECTION INTRAMUSCULAR; INTRAVENOUS
Status: DISPENSED
Start: 2018-11-20

## (undated) RX ORDER — CEFAZOLIN SODIUM/WATER 2 G/20 ML
SYRINGE (ML) INTRAVENOUS
Status: DISPENSED
Start: 2023-03-08

## (undated) RX ORDER — ONDANSETRON 2 MG/ML
INJECTION INTRAMUSCULAR; INTRAVENOUS
Status: DISPENSED
Start: 2018-11-20

## (undated) RX ORDER — TRANEXAMIC ACID 650 MG/1
TABLET ORAL
Status: DISPENSED
Start: 2023-03-08

## (undated) RX ORDER — PROPOFOL 10 MG/ML
INJECTION, EMULSION INTRAVENOUS
Status: DISPENSED
Start: 2023-03-08

## (undated) RX ORDER — KETOROLAC TROMETHAMINE 30 MG/ML
INJECTION, SOLUTION INTRAMUSCULAR; INTRAVENOUS
Status: DISPENSED
Start: 2023-03-08

## (undated) RX ORDER — VANCOMYCIN HYDROCHLORIDE 1 G/20ML
INJECTION, POWDER, LYOPHILIZED, FOR SOLUTION INTRAVENOUS
Status: DISPENSED
Start: 2023-03-08

## (undated) RX ORDER — CEFAZOLIN SODIUM 2 G/100ML
INJECTION, SOLUTION INTRAVENOUS
Status: DISPENSED
Start: 2018-11-20

## (undated) RX ORDER — FENTANYL CITRATE 50 UG/ML
INJECTION, SOLUTION INTRAMUSCULAR; INTRAVENOUS
Status: DISPENSED
Start: 2018-11-20

## (undated) RX ORDER — EPINEPHRINE 1 MG/ML(1)
AMPUL (ML) INJECTION
Status: DISPENSED
Start: 2018-11-20

## (undated) RX ORDER — ONDANSETRON 2 MG/ML
INJECTION INTRAMUSCULAR; INTRAVENOUS
Status: DISPENSED
Start: 2023-03-08

## (undated) RX ORDER — LIDOCAINE HYDROCHLORIDE 10 MG/ML
INJECTION, SOLUTION EPIDURAL; INFILTRATION; INTRACAUDAL; PERINEURAL
Status: DISPENSED
Start: 2018-11-20

## (undated) RX ORDER — LIDOCAINE HYDROCHLORIDE 10 MG/ML
INJECTION, SOLUTION EPIDURAL; INFILTRATION; INTRACAUDAL; PERINEURAL
Status: DISPENSED
Start: 2023-03-08

## (undated) RX ORDER — DEXAMETHASONE SODIUM PHOSPHATE 4 MG/ML
INJECTION, SOLUTION INTRA-ARTICULAR; INTRALESIONAL; INTRAMUSCULAR; INTRAVENOUS; SOFT TISSUE
Status: DISPENSED
Start: 2018-11-20

## (undated) RX ORDER — BUPIVACAINE HYDROCHLORIDE AND EPINEPHRINE 5; 5 MG/ML; UG/ML
INJECTION, SOLUTION EPIDURAL; INTRACAUDAL; PERINEURAL
Status: DISPENSED
Start: 2018-11-20

## (undated) RX ORDER — DEXAMETHASONE SODIUM PHOSPHATE 4 MG/ML
INJECTION, SOLUTION INTRA-ARTICULAR; INTRALESIONAL; INTRAMUSCULAR; INTRAVENOUS; SOFT TISSUE
Status: DISPENSED
Start: 2023-03-08

## (undated) RX ORDER — GLYCOPYRROLATE 0.2 MG/ML
INJECTION INTRAMUSCULAR; INTRAVENOUS
Status: DISPENSED
Start: 2023-03-08

## (undated) RX ORDER — FENTANYL CITRATE 50 UG/ML
INJECTION, SOLUTION INTRAMUSCULAR; INTRAVENOUS
Status: DISPENSED
Start: 2023-03-08

## (undated) RX ORDER — PHENYLEPHRINE HCL IN 0.9% NACL 1 MG/10 ML
SYRINGE (ML) INTRAVENOUS
Status: DISPENSED
Start: 2018-11-20

## (undated) RX ORDER — HYDROCODONE BITARTRATE AND ACETAMINOPHEN 5; 325 MG/1; MG/1
TABLET ORAL
Status: DISPENSED
Start: 2023-03-08

## (undated) RX ORDER — BUPIVACAINE HYDROCHLORIDE AND EPINEPHRINE 2.5; 5 MG/ML; UG/ML
INJECTION, SOLUTION EPIDURAL; INFILTRATION; INTRACAUDAL; PERINEURAL
Status: DISPENSED
Start: 2023-03-08